# Patient Record
Sex: FEMALE | Race: WHITE | NOT HISPANIC OR LATINO | Employment: OTHER | URBAN - METROPOLITAN AREA
[De-identification: names, ages, dates, MRNs, and addresses within clinical notes are randomized per-mention and may not be internally consistent; named-entity substitution may affect disease eponyms.]

---

## 2019-09-15 ENCOUNTER — HOSPITAL ENCOUNTER (EMERGENCY)
Facility: HOSPITAL | Age: 69
Discharge: HOME | End: 2019-09-15
Attending: EMERGENCY MEDICINE | Admitting: EMERGENCY MEDICINE

## 2019-09-15 VITALS
OXYGEN SATURATION: 96 % | SYSTOLIC BLOOD PRESSURE: 138 MMHG | DIASTOLIC BLOOD PRESSURE: 63 MMHG | TEMPERATURE: 98 F | RESPIRATION RATE: 16 BRPM | WEIGHT: 172.5 LBS | HEART RATE: 82 BPM | HEIGHT: 64 IN | BODY MASS INDEX: 29.45 KG/M2

## 2019-09-15 DIAGNOSIS — M54.16 LUMBAR RADICULOPATHY: Primary | ICD-10-CM

## 2019-09-15 PROCEDURE — 96372 THER/PROPH/DIAG INJ SC/IM: CPT

## 2019-09-15 PROCEDURE — 25010000002 HYDROMORPHONE 1 MG/ML SOLUTION: Performed by: EMERGENCY MEDICINE

## 2019-09-15 PROCEDURE — 25010000002 METHYLPREDNISOLONE PER 125 MG: Performed by: EMERGENCY MEDICINE

## 2019-09-15 PROCEDURE — 99283 EMERGENCY DEPT VISIT LOW MDM: CPT

## 2019-09-15 RX ORDER — METHYLPREDNISOLONE SODIUM SUCCINATE 125 MG/2ML
125 INJECTION, POWDER, LYOPHILIZED, FOR SOLUTION INTRAMUSCULAR; INTRAVENOUS ONCE
Status: COMPLETED | OUTPATIENT
Start: 2019-09-15 | End: 2019-09-15

## 2019-09-15 RX ORDER — SUCRALFATE 1 G/1
1 TABLET ORAL 4 TIMES DAILY
COMMUNITY
End: 2020-02-04

## 2019-09-15 RX ORDER — METHYLPREDNISOLONE 4 MG/1
TABLET ORAL
Qty: 21 EACH | Refills: 0 | Status: SHIPPED | OUTPATIENT
Start: 2019-09-15 | End: 2019-09-22

## 2019-09-15 RX ORDER — RANITIDINE 150 MG/1
300 TABLET ORAL DAILY
COMMUNITY
End: 2020-02-04

## 2019-09-15 RX ORDER — ATORVASTATIN CALCIUM 40 MG/1
40 TABLET, FILM COATED ORAL EVERY MORNING
COMMUNITY
End: 2020-02-17

## 2019-09-15 RX ADMIN — METHYLPREDNISOLONE SODIUM SUCCINATE 125 MG: 125 INJECTION, POWDER, FOR SOLUTION INTRAMUSCULAR; INTRAVENOUS at 12:30

## 2019-09-15 RX ADMIN — HYDROMORPHONE HYDROCHLORIDE 1 MG: 1 INJECTION, SOLUTION INTRAMUSCULAR; INTRAVENOUS; SUBCUTANEOUS at 12:30

## 2019-09-15 NOTE — ED PROVIDER NOTES
" EMERGENCY DEPARTMENT ENCOUNTER    Room Number:  34/34  Date of encounter:  9/15/2019  PCP: Grant Layne MD  Historian: Patient,       HPI:  Chief Complaint: \"Sciatica\"  A complete HPI/ROS/PMH/PSH/SH/FH are unobtainable due to: None    Context: Ellen Adamson is a 68 y.o. female who presents to the ED c/o \"sciatic pain\" of the right leg.  Symptoms began 3 days ago.  No specific injury.  Pain is severe at worst.  Worse with walking.  Denies incontinence of bowel or bladder.  No new numbness or weakness.  Patient has had similar symptoms in the past.  She does take Percocet tens periodically which are prescribed by her primary care doctor.  She does not have a neurosurgeon or pain management doctor.      PAST MEDICAL HISTORY  Active Ambulatory Problems     Diagnosis Date Noted   • No Active Ambulatory Problems     Resolved Ambulatory Problems     Diagnosis Date Noted   • No Resolved Ambulatory Problems     Past Medical History:   Diagnosis Date   • CKD (chronic kidney disease) 2019   • COPD (chronic obstructive pulmonary disease) (CMS/[facility])    • Depression    • GERD (gastroesophageal reflux disease)    • Sjogren's syndrome (CMS/[facility])          PAST SURGICAL HISTORY  Past Surgical History:   Procedure Laterality Date   • APPENDECTOMY     • CHOLECYSTECTOMY     • HYSTERECTOMY           FAMILY HISTORY  Family History   Problem Relation Age of Onset   • No Known Problems Mother    • No Known Problems Father          SOCIAL HISTORY  Social History     Socioeconomic History   • Marital status:      Spouse name: Not on file   • Number of children: Not on file   • Years of education: Not on file   • Highest education level: Not on file   Tobacco Use   • Smoking status: Heavy Tobacco Smoker     Packs/day: 1.00     Years: 25.00     Pack years: 25.00   Substance and Sexual Activity   • Alcohol use: No   • Drug use: Defer   • Sexual activity: Defer         ALLERGIES  Iodine        REVIEW OF SYSTEMS  Review of " "Systems   Constitutional: Negative for fever.   Respiratory: Negative for shortness of breath.    Cardiovascular: Negative for chest pain.         PHYSICAL EXAM    I have reviewed the triage vital signs and nursing notes.    ED Triage Vitals [09/15/19 1143]   Temp Heart Rate Resp BP SpO2   97.3 °F (36.3 °C) 111 17 126/60 98 %      Temp src Heart Rate Source Patient Position BP Location FiO2 (%)   Tympanic -- -- -- --       Physical Exam  GENERAL: not distressed  HENT: nares patent  EYES: no scleral icterus  CV: regular rhythm, regular rate  RESPIRATORY: normal effort  ABDOMEN: soft  MUSCULOSKELETAL: no deformity  NEURO: alert, moves all extremities, strength sensation is normal in bilateral lower extremities area there is no saddle paresthesia  SKIN: warm, dry    PROCEDURES    Procedures      MEDICATIONS GIVEN IN ER    Medications   HYDROmorphone (DILAUDID) injection 1 mg (1 mg Intramuscular Given 9/15/19 1230)   methylPREDNISolone sodium succinate (SOLU-Medrol) injection 125 mg (125 mg Intramuscular Given 9/15/19 1230)         PROGRESS, DATA ANALYSIS, CONSULTS, AND MEDICAL DECISION MAKING    All labs have been independently reviewed by me.  All radiology studies have been reviewed by me and discussed with radiologist dictating the report.   EKG's independently viewed and interpreted by me.  Discussion below represents my analysis of pertinent findings related to patient's condition, differential diagnosis, treatment plan and final disposition.      ED Course as of Sep 15 1316   Sun Sep 15, 2019   1213 Patient with chronic lower back pain presents with right-sided sciatica.  I see no \"red flags\" on exam or history.  Will treat with IM Dilaudid/Phenergan and IM Solu-Medrol.  [DB]   1314 Patient is feeling much better after IM medications.  Will give a short course of Medrol Dosepak and follow-up with primary care provider.  Patient has Norco tens to take at home.  [DB]      ED Course User Index  [DB] Jann Berkowitz, " MD       AS OF 1:16 PM VITALS:    BP - 126/60  HR - 111  TEMP - 97.3 °F (36.3 °C) (Tympanic)  02 SATS - 98%        DIAGNOSIS  Final diagnoses:   Lumbar radiculopathy         DISPOSITION  DISCHARGE    Patient discharged in stable condition.    Reviewed implications of results, diagnosis, meds, responsibility to follow up, warning signs and symptoms of possible worsening, potential complications and reasons to return to ER, including increased back pain, fever or as needed.    Patient/Family voiced understanding of above instructions.    Discussed plan for discharge, as there is no emergent indication for admission. Patient referred to primary care provider for BP management due to today's BP. Pt/family is agreeable and understands need for follow up and repeat testing.  Pt is aware that discharge does not mean that nothing is wrong but it indicates no emergency is present that requires admission and they must continue care with follow-up as given below or physician of their choice.     FOLLOW-UP  Grant Layne MD  [address]  [address]  [place] [id number] [phone]    In 1 week  If Not Better         Medication List      New Prescriptions    methylPREDNISolone 4 MG tablet  Commonly known as:  MEDROL (KIARRA)  Take as directed on package instructions.                   Jann Berkowitz MD  09/15/19 5854

## 2019-09-15 NOTE — ED TRIAGE NOTES
Pt reports sciatica flare up x 3days, attempted to treat with heat, ice and pain patch with no relief. Pt arrives aaox4, abc's intact, ambulatory steady gait, NAD noted at this time.

## 2019-09-22 ENCOUNTER — HOSPITAL ENCOUNTER (EMERGENCY)
Facility: HOSPITAL | Age: 69
Discharge: HOME | End: 2019-09-22
Attending: EMERGENCY MEDICINE | Admitting: EMERGENCY MEDICINE

## 2019-09-22 VITALS
WEIGHT: 172.5 LBS | RESPIRATION RATE: 18 BRPM | BODY MASS INDEX: 29.45 KG/M2 | DIASTOLIC BLOOD PRESSURE: 64 MMHG | HEIGHT: 64 IN | TEMPERATURE: 97.8 F | OXYGEN SATURATION: 94 % | HEART RATE: 114 BPM | SYSTOLIC BLOOD PRESSURE: 142 MMHG

## 2019-09-22 DIAGNOSIS — M54.31 SCIATICA OF RIGHT SIDE: Primary | ICD-10-CM

## 2019-09-22 PROCEDURE — 25010000002 HYDROMORPHONE 1 MG/ML SOLUTION: Performed by: EMERGENCY MEDICINE

## 2019-09-22 PROCEDURE — 99283 EMERGENCY DEPT VISIT LOW MDM: CPT

## 2019-09-22 PROCEDURE — 96372 THER/PROPH/DIAG INJ SC/IM: CPT

## 2019-09-22 RX ORDER — CYCLOBENZAPRINE HCL 10 MG
10 TABLET ORAL 3 TIMES DAILY PRN
Qty: 30 TABLET | Refills: 0 | Status: SHIPPED | OUTPATIENT
Start: 2019-09-22 | End: 2020-02-04

## 2019-09-22 RX ORDER — ONDANSETRON 4 MG/1
4 TABLET, ORALLY DISINTEGRATING ORAL ONCE
Status: COMPLETED | OUTPATIENT
Start: 2019-09-22 | End: 2019-09-22

## 2019-09-22 RX ADMIN — HYDROMORPHONE HYDROCHLORIDE 1 MG: 1 INJECTION, SOLUTION INTRAMUSCULAR; INTRAVENOUS; SUBCUTANEOUS at 11:37

## 2019-09-22 RX ADMIN — ONDANSETRON 4 MG: 4 TABLET, ORALLY DISINTEGRATING ORAL at 11:37

## 2019-09-22 NOTE — ED NOTES
"Pt states \"My sciatica on my right side is killing me.\" Pt was seen here last week for same.     July Tobar, RN  09/22/19 1017    "

## 2019-09-22 NOTE — ED TRIAGE NOTES
"Pt c/o right sided sciatica pain for three weeks. She waas seen here for the same one week ago, given \"Dilaudid and steroids\" but she feels the pain is worsening. Pt reports she had similar issues about 8 years ago for which she had successful treatment with epidurals. Has not needed an epidural in several years.   "

## 2019-09-22 NOTE — ED PROVIDER NOTES
" EMERGENCY DEPARTMENT ENCOUNTER    Room Number:  04/04  Date of encounter:  9/22/2019  PCP: Grant Layne MD  Historian: Pt      HPI:  Chief Complaint: Right leg pain  A complete HPI/ROS/PMH/PSH/SH/FH are unobtainable due to: n/a    Context: Ellen Adamson is a 68 y.o. female who presents to the ED c/o acute on chronic right sided \"sciatica\" pain that has been progressively worsening for 3 weeks. She c/o burning pain that radiates down her RLE and into her groin. Her pain is aggravated by walking and her right leg occasionally \"gives out\" on her. Pt was seen in the ED on 9/15/19 for the same pain and was prescribed Dilaudid and Prednisone. She has taken these medication w/o significant relief. Pt denies recent fall, fever, saddle anaesthesia, bowel or bladder incontinence, weakness, and numbness.        Duration:  3 weeks  Timing: constant  Quality: burning  Intensity/Severity: moderate  Progression: worsening  Aggravating Factors: walking   Alleviating Factors: none      PAST MEDICAL HISTORY  Active Ambulatory Problems     Diagnosis Date Noted   • No Active Ambulatory Problems     Resolved Ambulatory Problems     Diagnosis Date Noted   • No Resolved Ambulatory Problems     Past Medical History:   Diagnosis Date   • CKD (chronic kidney disease) 2019   • COPD (chronic obstructive pulmonary disease) (CMS/[facility])    • Depression    • GERD (gastroesophageal reflux disease)    • Sjogren's syndrome (CMS/[facility])          PAST SURGICAL HISTORY  Past Surgical History:   Procedure Laterality Date   • APPENDECTOMY     • CHOLECYSTECTOMY     • HYSTERECTOMY           FAMILY HISTORY  Family History   Problem Relation Age of Onset   • No Known Problems Mother    • No Known Problems Father          SOCIAL HISTORY  Social History     Socioeconomic History   • Marital status:      Spouse name: Not on file   • Number of children: Not on file   • Years of education: Not on file   • Highest education level: Not on file   Tobacco " Use   • Smoking status: Heavy Tobacco Smoker     Packs/day: 1.00     Years: 25.00     Pack years: 25.00   Substance and Sexual Activity   • Alcohol use: No   • Drug use: Defer   • Sexual activity: Defer         ALLERGIES  Iodine        REVIEW OF SYSTEMS  Review of Systems   Constitutional: Negative for fever.   HENT: Negative for sore throat.    Eyes: Negative.    Respiratory: Negative for cough and shortness of breath.    Cardiovascular: Negative for chest pain.   Gastrointestinal: Negative for abdominal pain, diarrhea and vomiting.   Genitourinary: Negative for dysuria.   Musculoskeletal: Positive for myalgias (RLE). Negative for neck pain.   Skin: Negative for rash.   Allergic/Immunologic: Negative.    Neurological: Negative for weakness, numbness and headaches.        Denies bowel or bladder incontinence    Hematological: Negative.    Psychiatric/Behavioral: Negative.    All other systems reviewed and are negative.     All systems reviewed and negative except for those discussed in HPI.       PHYSICAL EXAM    I have reviewed the triage vital signs and nursing notes.    ED Triage Vitals   Temp Heart Rate Resp BP SpO2   09/22/19 1018 09/22/19 1018 09/22/19 1018 09/22/19 1045 09/22/19 1018   97.8 °F (36.6 °C) 114 18 142/64 94 %      Temp src Heart Rate Source Patient Position BP Location FiO2 (%)   09/22/19 1018 -- 09/22/19 1045 09/22/19 1045 --   Tympanic  Sitting Right arm        Physical Exam   Constitutional: Pt. is oriented to person, place, and time and well-developed, well-nourished, and in no distress. No distress.   HENT:   Head: Normocephalic and atraumatic.   Eyes: EOM are normal. Pupils are equal, round, and reactive to light.   Neck: Normal range of motion. Neck supple. No JVD present. No tracheal deviation present. No thyromegaly present.   Cardiovascular: Normal rate, regular rhythm and normal heart sounds. Exam reveals no gallop and no friction rub.   No murmur heard.  Pulmonary/Chest: Effort normal  and breath sounds normal. No stridor. No respiratory distress. No wheezes, no rales.   Abdominal: Soft. Bowel sounds are normal. No distension. There is no tenderness. There is no rebound and no guarding.   Musculoskeletal: Normal range of motion. No edema, tenderness or deformity.   Neurological: Pt. is alert and oriented to person, place, and time. Pt. has normal sensation and normal strength. No cranial nerve deficit. GCS score is 15.   5/5 strength to hip flexion, knee extension/flexion, dorsiflexion, plantarflexion, and EHL  2+ patellar reflexes bilaterally  SILT at bilateral superficial peroneal, deep peroneal, sural, and saphenous nerves  Skin: Skin is warm and dry. No rash noted. Pt. is not diaphoretic. No erythema.   Psychiatric: Mood, affect and judgment normal.   Nursing note and vitals reviewed.        PROCEDURES    Procedures      MEDICATIONS GIVEN IN ER    Medications   HYDROmorphone (DILAUDID) injection 1 mg (1 mg Intramuscular Given 9/22/19 1137)   ondansetron ODT (ZOFRAN-ODT) disintegrating tablet 4 mg (4 mg Oral Given 9/22/19 1137)         PROGRESS, DATA ANALYSIS, CONSULTS, AND MEDICAL DECISION MAKING   Discussion below represents my analysis of pertinent findings related to patient's condition, differential diagnosis, treatment plan and final disposition.      ED Course as of Sep 22 1540   Sun Sep 22, 2019   1130 Patient denies any trauma to back. No fevers. No h/o IVDU. No chronic steroid use. No anticoagulants. No h/o malignancy. No saddle anesthesia or urinary/fecal incontinence.     5/5 strength to hip flexion, knee extension/flexion, dorsiflexion, plantarflexion, and EHL  2+ patellar reflexes bilaterally  SILT at bilateral superficial peroneal, deep peroneal, sural, and saphenous nerves    [WC]      ED Course User Index  [WC] Kris Chilel MD      11:29 AM  Informed pt that she needs to f/u with her PCP to get a referral to pain management. Pt will be discharged after 1 dose of pain  medicine in the ED. Will prescribe muscle relaxer. Pt understands and agrees with the plan, all questions answered.  Zofran and Dilaudid ordered.       AS OF 3:40 PM VITALS:    BP - 142/64  HR - 114  TEMP - 97.8 °F (36.6 °C) (Tympanic)  02 SATS - 94%        DIAGNOSIS  Final diagnoses:   Sciatica of right side         DISPOSITION  DISCHARGE    Patient discharged in stable condition.    Reviewed implications of results, diagnosis, meds, responsibility to follow up, warning signs and symptoms of possible worsening, potential complications and reasons to return to ER.    Patient/Family voiced understanding of above instructions.    Discussed plan for discharge, as there is no emergent indication for admission. Patient referred to primary care provider for BP management due to today's BP. Pt/family is agreeable and understands need for follow up and repeat testing.  Pt is aware that discharge does not mean that nothing is wrong but it indicates no emergency is present that requires admission and they must continue care with follow-up as given below or physician of their choice.     FOLLOW-UP  Grant Layne MD  [address]  [place] [id number]  [phone]    Schedule an appointment as soon as possible for a visit   for referral to pain management.         Medication List      New Prescriptions    cyclobenzaprine 10 MG tablet  Commonly known as:  FLEXERIL  Take 1 tablet by mouth 3 (Three) Times a Day As Needed for Muscle Spasms.        Stop    metaxalone 800 MG tablet  Commonly known as:  SKELAXIN                Documentation assistance provided by kathrin Stiles for Dr. Chilel.  Information recorded by the neetue was done at my direction and has been verified and validated by me.        Gabrielle Stiles  09/22/19 1256       Kris Chilel MD  09/22/19 0478

## 2020-02-04 ENCOUNTER — HOSPITAL ENCOUNTER (OUTPATIENT)
Dept: GENERAL RADIOLOGY | Facility: HOSPITAL | Age: 70
Discharge: HOME | End: 2020-02-04

## 2020-02-04 ENCOUNTER — APPOINTMENT (OUTPATIENT)
Dept: PREADMISSION TESTING | Facility: HOSPITAL | Age: 70
End: 2020-02-04

## 2020-02-04 ENCOUNTER — HOSPITAL ENCOUNTER (OUTPATIENT)
Dept: GENERAL RADIOLOGY | Facility: HOSPITAL | Age: 70
Discharge: HOME | End: 2020-02-04
Admitting: ORTHOPAEDIC SURGERY

## 2020-02-04 LAB
ANION GAP SERPL CALCULATED.3IONS-SCNC: 13.8 MMOL/L (ref 5–15)
BACTERIA UR QL AUTO: NORMAL /HPF
BILIRUB UR QL STRIP: NEGATIVE
BUN BLD-MCNC: 17 MG/DL (ref 8–23)
BUN/CREAT SERPL: 16.8 (ref 7–25)
CALCIUM SPEC-SCNC: 9.3 MG/DL (ref 8.6–10.5)
CHLORIDE SERPL-SCNC: 102 MMOL/L (ref 98–107)
CLARITY UR: CLEAR
CO2 SERPL-SCNC: 24.2 MMOL/L (ref 22–29)
COLOR UR: YELLOW
CREAT BLD-MCNC: 1.01 MG/DL (ref 0.57–1)
DEPRECATED RDW RBC AUTO: 46.5 FL (ref 37–54)
ERYTHROCYTE [DISTWIDTH] IN BLOOD BY AUTOMATED COUNT: 14.5 % (ref 12.3–15.4)
GFR SERPL CREATININE-BSD FRML MDRD: 54 ML/MIN/1.73
GLUCOSE BLD-MCNC: 93 MG/DL (ref 65–99)
GLUCOSE UR STRIP-MCNC: NEGATIVE MG/DL
HCT VFR BLD AUTO: 37 % (ref 34–46.6)
HGB BLD-MCNC: 12 G/DL (ref 12–15.9)
HGB UR QL STRIP.AUTO: NEGATIVE
HYALINE CASTS UR QL AUTO: NORMAL /LPF
KETONES UR QL STRIP: NEGATIVE
LEUKOCYTE ESTERASE UR QL STRIP.AUTO: NEGATIVE
MCH RBC QN AUTO: 28.2 PG (ref 26.6–33)
MCHC RBC AUTO-ENTMCNC: 32.4 G/DL (ref 31.5–35.7)
MCV RBC AUTO: 87.1 FL (ref 79–97)
NITRITE UR QL STRIP: NEGATIVE
PH UR STRIP.AUTO: <=5 [PH] (ref 5–8)
PLATELET # BLD AUTO: 357 10*3/MM3 (ref 140–450)
PMV BLD AUTO: 9.8 FL (ref 6–12)
POTASSIUM BLD-SCNC: 4 MMOL/L (ref 3.5–5.2)
PROT UR QL STRIP: NEGATIVE
RBC # BLD AUTO: 4.25 10*6/MM3 (ref 3.77–5.28)
RBC # UR: NORMAL /HPF
REF LAB TEST METHOD: NORMAL
SODIUM BLD-SCNC: 140 MMOL/L (ref 136–145)
SP GR UR STRIP: 1.02 (ref 1–1.03)
SQUAMOUS #/AREA URNS HPF: NORMAL /HPF
UROBILINOGEN UR QL STRIP: NORMAL
WBC NRBC COR # BLD: 11.22 10*3/MM3 (ref 3.4–10.8)
WBC UR QL AUTO: NORMAL /HPF

## 2020-02-04 PROCEDURE — 73502 X-RAY EXAM HIP UNI 2-3 VIEWS: CPT

## 2020-02-04 PROCEDURE — 81001 URINALYSIS AUTO W/SCOPE: CPT | Performed by: ORTHOPAEDIC SURGERY

## 2020-02-04 PROCEDURE — 36415 COLL VENOUS BLD VENIPUNCTURE: CPT

## 2020-02-04 PROCEDURE — 85027 COMPLETE CBC AUTOMATED: CPT | Performed by: ORTHOPAEDIC SURGERY

## 2020-02-04 PROCEDURE — 87086 URINE CULTURE/COLONY COUNT: CPT | Performed by: ORTHOPAEDIC SURGERY

## 2020-02-04 PROCEDURE — 80048 BASIC METABOLIC PNL TOTAL CA: CPT | Performed by: ORTHOPAEDIC SURGERY

## 2020-02-04 PROCEDURE — 71046 X-RAY EXAM CHEST 2 VIEWS: CPT

## 2020-02-04 PROCEDURE — 93010 ELECTROCARDIOGRAM REPORT: CPT | Performed by: INTERNAL MEDICINE

## 2020-02-04 PROCEDURE — A9270 NON-COVERED ITEM OR SERVICE: HCPCS | Performed by: ORTHOPAEDIC SURGERY

## 2020-02-04 PROCEDURE — 63710000001 MUPIROCIN 2 % OINTMENT: Performed by: ORTHOPAEDIC SURGERY

## 2020-02-04 PROCEDURE — 93005 ELECTROCARDIOGRAM TRACING: CPT

## 2020-02-04 RX ORDER — DULOXETIN HYDROCHLORIDE 60 MG/1
60 CAPSULE, DELAYED RELEASE ORAL EVERY MORNING
Status: ON HOLD | COMMUNITY
End: 2020-02-28

## 2020-02-04 ASSESSMENT — HOOS JR
HOOS JR SCORE: 18
HOOS JR SCORE: 32.735

## 2020-02-04 NOTE — DISCHARGE INSTRUCTIONS
Take the following medications the morning of surgery:    VIIBYRD, CYMBALTA  AND WELLBURTIN    ARRIVE AT 5:15    General Instructions:  • Do not eat solid food after midnight the night before surgery.  • You may drink clear liquids day of surgery but must stop at least one hour before your hospital arrival time.  • It is beneficial for you to have a clear drink that contains carbohydrates the day of surgery.  We suggest a 12 to 20 ounce bottle of Gatorade or Powerade for non-diabetic patients or a 12 to 20 ounce bottle of G2 or Powerade Zero for diabetic patients. (Pediatric patients, are not advised to drink a 12 to 20 ounce carbohydrate drink)    Clear liquids are liquids you can see through.  Nothing red in color.     Plain water                               Sports drinks  Sodas                                   Gelatin (Jell-O)  Fruit juices without pulp such as white grape juice and apple juice  Popsicles that contain no fruit or yogurt  Tea or coffee (no cream or milk added)  Gatorade / Powerade  G2 / Powerade Zero    • Infants may have breast milk up to four hours before surgery.  • Infants drinking formula may drink formula up to six hours before surgery.   • Patients who avoid smoking, chewing tobacco and alcohol for 4 weeks prior to surgery have a reduced risk of post-operative complications.  Quit smoking as many days before surgery as you can.  • Do not smoke, use chewing tobacco or drink alcohol the day of surgery.   • If applicable bring your C-PAP/ BI-PAP machine.  • Bring any papers given to you in the doctor’s office.  • Wear clean comfortable clothes.  • Do not wear contact lenses, false eyelashes or make-up.  Bring a case for your glasses.   • Bring crutches or walker if applicable.  • Remove all piercings.  Leave jewelry and any other valuables at home.  • Hair extensions with metal clips must be removed prior to surgery.  • The Pre-Admission Testing nurse will instruct you to bring medications  if unable to obtain an accurate list in Pre-Admission Testing.        If you were given a blood bank ID arm band remember to bring it with you the day of surgery.    Preventing a Surgical Site Infection:  • For 2 to 3 days before surgery, avoid shaving with a razor because the razor can irritate skin and make it easier to develop an infection.    • Any areas of open skin can increase the risk of a post-operative wound infection by allowing bacteria to enter and travel throughout the body.  Notify your surgeon if you have any skin wounds / rashes even if it is not near the expected surgical site.  The area will need assessed to determine if surgery should be delayed until it is healed.  • The night prior to surgery sleep in a clean bed with clean clothing.  Do not allow pets to sleep with you.  • Shower on the morning of surgery using a fresh bar of anti-bacterial soap (such as Dial) and clean washcloth.  Dry with a clean towel and dress in clean clothing.  • Ask your surgeon if you will be receiving antibiotics prior to surgery.  • Make sure you, your family, and all healthcare providers clean their hands with soap and water or an alcohol based hand  before caring for you or your wound.    Day of surgery:  Your arrival time is approximately two hours before your scheduled surgery time.  Upon arrival, a Pre-op nurse and Anesthesiologist will review your health history, obtain vital signs, and answer questions you may have.  The only belongings needed at this time will be a list of your home medications and if applicable your C-PAP/BI-PAP machine.  If you are staying overnight your family can leave the rest of your belongings in the car and bring them to your room later.  A Pre-op nurse will start an IV and you may receive medication in preparation for surgery, including something to help you relax.  Your family will be able to see you in the Pre-op area.  Two visitors at a time will be allowed in the Pre-op  room.  While you are in surgery your family should notify the waiting room  if they leave the waiting room area and provide a contact phone number.    Please be aware that surgery does come with discomfort.  We want to make every effort to control your discomfort so please discuss any uncontrolled symptoms with your nurse.   Your doctor will most likely have prescribed pain medications.      If you are going home after surgery you will receive individualized written care instructions before being discharged.  A responsible adult must drive you to and from the hospital on the day of your surgery and stay with you for 24 hours.    If you are staying overnight following surgery, you will be transported to your hospital room following the recovery period.  [facility] has all private rooms.    If you have any questions please call Pre-Admission Testing at [phone].  Deductibles and co-payments are collected on the day of service. Please be prepared to pay the required co-pay, deductible or deposit on the day of service as defined by your plan.  2% CHLORHEXIDINE GLUCONATE* CLOTH  Preparing or “prepping” skin before surgery can reduce the risk of infection at the surgical site. To make the process easier, [facility] has chosen disposable cloths moistened with a rinse-free, 2% Chlorhexidine Gluconate (CHG) antiseptic solution. The steps below outline the prepping process and should be carefully followed.        Use the prep cloth on the area that is circled in the diagram             Directions Night before Surgery  1) Shower using a fresh bar of anti-bacterial soap (such as Dial) and clean washcloth.  Use a clean towel to completely dry your skin.  2) Do not use any lotions, oils or creams on your skin.  3) Open the package and remove 1 cloth, wipe your skin for 30 seconds in a circular motion.  Allow to dry for 3 minutes.  4) Repeat #3 with second cloth.  5) Do not touch your  eyes, ears, or mouth with the prep cloth.  6) Allow the wet prep solution to air dry.  7) Discard the prep cloth and wash your hands with soap and water.   8) Dress in clean bed clothes and sleep on fresh clean bed sheets.   9) You may experience some temporary itching after the prep.    Directions Day of Surgery  1) Repeat steps 1,2,3,4,5,6,7, and 9.   2) Dress in clean clothes before coming to the hospital.    BACTROBAN NASAL OINTMENT  There are many germs normally in your nose. Bactroban is an ointment that will help reduce these germs. Please follow these instructions for Bactroban use:      ____The day before surgery in the morning  Date________    ____The day before surgery in the evening              Date________    ____The day of surgery in the morning    Date________    **Squirt ½ package of Bactroban Ointment onto a cotton applicator and apply to inside of 1st nostril.  Squirt the remaining Bactroban and apply to the inside of the other nostril.    PERIDEX- ORAL:  Use only if your surgeon has ordered  Use the night before and morning of surgery - Swish, gargle, and spit - do not swallow.

## 2020-02-05 LAB — BACTERIA SPEC AEROBE CULT: NO GROWTH

## 2020-02-10 ENCOUNTER — HOSPITAL ENCOUNTER (INPATIENT)
Facility: HOSPITAL | Age: 70
LOS: 1 days | Discharge: HOME HEALTH | End: 2020-02-11
Attending: ORTHOPAEDIC SURGERY | Admitting: ORTHOPAEDIC SURGERY

## 2020-02-10 ENCOUNTER — ANESTHESIA (OUTPATIENT)
Dept: PERIOP | Facility: HOSPITAL | Age: 70
End: 2020-02-10

## 2020-02-10 ENCOUNTER — ANESTHESIA EVENT (OUTPATIENT)
Dept: PERIOP | Facility: HOSPITAL | Age: 70
End: 2020-02-10

## 2020-02-10 ENCOUNTER — APPOINTMENT (OUTPATIENT)
Dept: GENERAL RADIOLOGY | Facility: HOSPITAL | Age: 70
End: 2020-02-10

## 2020-02-10 DIAGNOSIS — M16.11 PRIMARY OSTEOARTHRITIS OF RIGHT HIP: Primary | ICD-10-CM

## 2020-02-10 LAB
HCT VFR BLD AUTO: 34.3 % (ref 34–46.6)
HGB BLD-MCNC: 11.2 G/DL (ref 12–15.9)

## 2020-02-10 PROCEDURE — 25010000002 DEXAMETHASONE PER 1 MG: Performed by: NURSE ANESTHETIST, CERTIFIED REGISTERED

## 2020-02-10 PROCEDURE — 73501 X-RAY EXAM HIP UNI 1 VIEW: CPT

## 2020-02-10 PROCEDURE — C1776 JOINT DEVICE (IMPLANTABLE): HCPCS | Performed by: ORTHOPAEDIC SURGERY

## 2020-02-10 PROCEDURE — C9290 INJ, BUPIVACAINE LIPOSOME: HCPCS | Performed by: ORTHOPAEDIC SURGERY

## 2020-02-10 PROCEDURE — 76000 FLUOROSCOPY <1 HR PHYS/QHP: CPT

## 2020-02-10 PROCEDURE — 25010000003 BUPIVACAINE LIPOSOME 1.3 % SUSPENSION 20 ML VIAL: Performed by: ORTHOPAEDIC SURGERY

## 2020-02-10 PROCEDURE — 25010000002 FENTANYL CITRATE (PF) 100 MCG/2ML SOLUTION: Performed by: NURSE ANESTHETIST, CERTIFIED REGISTERED

## 2020-02-10 PROCEDURE — 85014 HEMATOCRIT: CPT | Performed by: ORTHOPAEDIC SURGERY

## 2020-02-10 PROCEDURE — 25010000002 PROPOFOL 10 MG/ML EMULSION: Performed by: NURSE ANESTHETIST, CERTIFIED REGISTERED

## 2020-02-10 PROCEDURE — 25010000003 CEFAZOLIN IN DEXTROSE 2-4 GM/100ML-% SOLUTION: Performed by: ORTHOPAEDIC SURGERY

## 2020-02-10 PROCEDURE — 25010000002 NEOSTIGMINE PER 0.5 MG: Performed by: NURSE ANESTHETIST, CERTIFIED REGISTERED

## 2020-02-10 PROCEDURE — 97110 THERAPEUTIC EXERCISES: CPT | Performed by: PHYSICAL THERAPIST

## 2020-02-10 PROCEDURE — 25010000002 ONDANSETRON PER 1 MG: Performed by: NURSE ANESTHETIST, CERTIFIED REGISTERED

## 2020-02-10 PROCEDURE — 97162 PT EVAL MOD COMPLEX 30 MIN: CPT | Performed by: PHYSICAL THERAPIST

## 2020-02-10 PROCEDURE — 85018 HEMOGLOBIN: CPT | Performed by: ORTHOPAEDIC SURGERY

## 2020-02-10 PROCEDURE — 0SR904A REPLACEMENT OF RIGHT HIP JOINT WITH CERAMIC ON POLYETHYLENE SYNTHETIC SUBSTITUTE, UNCEMENTED, OPEN APPROACH: ICD-10-PCS | Performed by: ORTHOPAEDIC SURGERY

## 2020-02-10 DEVICE — LINER ACET G7 VIVACIT/E HI/WL HXPE SZC 32MM: Type: IMPLANTABLE DEVICE | Site: HIP | Status: FUNCTIONAL

## 2020-02-10 DEVICE — CAP HIP TM UPCHRG: Type: IMPLANTABLE DEVICE | Site: HIP | Status: FUNCTIONAL

## 2020-02-10 DEVICE — CAP HIP VE UPCHRG: Type: IMPLANTABLE DEVICE | Site: HIP | Status: FUNCTIONAL

## 2020-02-10 DEVICE — CAP CERAM HD HIP UPCHRG: Type: IMPLANTABLE DEVICE | Site: HIP | Status: FUNCTIONAL

## 2020-02-10 DEVICE — TOTAL HIP PRIMARY: Type: IMPLANTABLE DEVICE | Site: HIP | Status: FUNCTIONAL

## 2020-02-10 DEVICE — SHLL ACET OSSEOTI G7 3H SZC 48MM: Type: IMPLANTABLE DEVICE | Site: HIP | Status: FUNCTIONAL

## 2020-02-10 DEVICE — IMPLANTABLE DEVICE: Type: IMPLANTABLE DEVICE | Site: HIP | Status: FUNCTIONAL

## 2020-02-10 DEVICE — BIOLOX® DELTA, CERAMIC FEMORAL HEAD, S, Ø 32/-3.5, TAPER 12/14
Type: IMPLANTABLE DEVICE | Site: HIP | Status: FUNCTIONAL
Brand: BIOLOX® DELTA

## 2020-02-10 RX ORDER — EPHEDRINE SULFATE 50 MG/ML
5 INJECTION, SOLUTION INTRAVENOUS ONCE AS NEEDED
Status: DISCONTINUED | OUTPATIENT
Start: 2020-02-10 | End: 2020-02-10 | Stop reason: HOSPADM

## 2020-02-10 RX ORDER — VILAZODONE HYDROCHLORIDE 40 MG/1
40 TABLET ORAL EVERY MORNING
Status: DISCONTINUED | OUTPATIENT
Start: 2020-02-11 | End: 2020-02-11 | Stop reason: HOSPADM

## 2020-02-10 RX ORDER — DEXAMETHASONE SODIUM PHOSPHATE 10 MG/ML
INJECTION INTRAMUSCULAR; INTRAVENOUS AS NEEDED
Status: DISCONTINUED | OUTPATIENT
Start: 2020-02-10 | End: 2020-02-10 | Stop reason: SURG

## 2020-02-10 RX ORDER — HYDROMORPHONE HYDROCHLORIDE 1 MG/ML
0.5 INJECTION, SOLUTION INTRAMUSCULAR; INTRAVENOUS; SUBCUTANEOUS
Status: DISCONTINUED | OUTPATIENT
Start: 2020-02-10 | End: 2020-02-10 | Stop reason: HOSPADM

## 2020-02-10 RX ORDER — BISACODYL 10 MG
10 SUPPOSITORY, RECTAL RECTAL DAILY PRN
Status: DISCONTINUED | OUTPATIENT
Start: 2020-02-10 | End: 2020-02-11 | Stop reason: HOSPADM

## 2020-02-10 RX ORDER — ACETAMINOPHEN 325 MG/1
325 TABLET ORAL EVERY 4 HOURS PRN
Status: DISCONTINUED | OUTPATIENT
Start: 2020-02-10 | End: 2020-02-11 | Stop reason: HOSPADM

## 2020-02-10 RX ORDER — ONDANSETRON 2 MG/ML
INJECTION INTRAMUSCULAR; INTRAVENOUS AS NEEDED
Status: DISCONTINUED | OUTPATIENT
Start: 2020-02-10 | End: 2020-02-10 | Stop reason: SURG

## 2020-02-10 RX ORDER — SODIUM CHLORIDE 0.9 % (FLUSH) 0.9 %
3-10 SYRINGE (ML) INJECTION AS NEEDED
Status: DISCONTINUED | OUTPATIENT
Start: 2020-02-10 | End: 2020-02-11 | Stop reason: HOSPADM

## 2020-02-10 RX ORDER — DULOXETIN HYDROCHLORIDE 60 MG/1
60 CAPSULE, DELAYED RELEASE ORAL EVERY MORNING
Status: DISCONTINUED | OUTPATIENT
Start: 2020-02-11 | End: 2020-02-11 | Stop reason: HOSPADM

## 2020-02-10 RX ORDER — SODIUM CHLORIDE, SODIUM LACTATE, POTASSIUM CHLORIDE, CALCIUM CHLORIDE 600; 310; 30; 20 MG/100ML; MG/100ML; MG/100ML; MG/100ML
9 INJECTION, SOLUTION INTRAVENOUS CONTINUOUS
Status: DISCONTINUED | OUTPATIENT
Start: 2020-02-10 | End: 2020-02-10 | Stop reason: HOSPADM

## 2020-02-10 RX ORDER — PROPOFOL 10 MG/ML
VIAL (ML) INTRAVENOUS AS NEEDED
Status: DISCONTINUED | OUTPATIENT
Start: 2020-02-10 | End: 2020-02-10 | Stop reason: SURG

## 2020-02-10 RX ORDER — UREA 10 %
1 LOTION (ML) TOPICAL NIGHTLY PRN
Status: DISCONTINUED | OUTPATIENT
Start: 2020-02-10 | End: 2020-02-11 | Stop reason: HOSPADM

## 2020-02-10 RX ORDER — CEFAZOLIN SODIUM 2 G/100ML
2 INJECTION, SOLUTION INTRAVENOUS EVERY 8 HOURS
Status: COMPLETED | OUTPATIENT
Start: 2020-02-10 | End: 2020-02-10

## 2020-02-10 RX ORDER — ONDANSETRON 2 MG/ML
4 INJECTION INTRAMUSCULAR; INTRAVENOUS ONCE AS NEEDED
Status: DISCONTINUED | OUTPATIENT
Start: 2020-02-10 | End: 2020-02-10 | Stop reason: HOSPADM

## 2020-02-10 RX ORDER — SODIUM CHLORIDE, SODIUM LACTATE, POTASSIUM CHLORIDE, CALCIUM CHLORIDE 600; 310; 30; 20 MG/100ML; MG/100ML; MG/100ML; MG/100ML
100 INJECTION, SOLUTION INTRAVENOUS CONTINUOUS
Status: DISCONTINUED | OUTPATIENT
Start: 2020-02-10 | End: 2020-02-11 | Stop reason: HOSPADM

## 2020-02-10 RX ORDER — ROCURONIUM BROMIDE 10 MG/ML
INJECTION, SOLUTION INTRAVENOUS AS NEEDED
Status: DISCONTINUED | OUTPATIENT
Start: 2020-02-10 | End: 2020-02-10 | Stop reason: SURG

## 2020-02-10 RX ORDER — HYDROCODONE BITARTRATE AND ACETAMINOPHEN 10; 325 MG/1; MG/1
1 TABLET ORAL EVERY 4 HOURS PRN
Status: DISCONTINUED | OUTPATIENT
Start: 2020-02-10 | End: 2020-02-11 | Stop reason: HOSPADM

## 2020-02-10 RX ORDER — MAGNESIUM HYDROXIDE 1200 MG/15ML
LIQUID ORAL AS NEEDED
Status: DISCONTINUED | OUTPATIENT
Start: 2020-02-10 | End: 2020-02-10 | Stop reason: HOSPADM

## 2020-02-10 RX ORDER — ONDANSETRON 4 MG/1
4 TABLET, FILM COATED ORAL EVERY 6 HOURS PRN
Status: DISCONTINUED | OUTPATIENT
Start: 2020-02-10 | End: 2020-02-11 | Stop reason: HOSPADM

## 2020-02-10 RX ORDER — FAMOTIDINE 10 MG/ML
20 INJECTION, SOLUTION INTRAVENOUS ONCE
Status: COMPLETED | OUTPATIENT
Start: 2020-02-10 | End: 2020-02-10

## 2020-02-10 RX ORDER — SENNA AND DOCUSATE SODIUM 50; 8.6 MG/1; MG/1
2 TABLET, FILM COATED ORAL 2 TIMES DAILY
Status: DISCONTINUED | OUTPATIENT
Start: 2020-02-10 | End: 2020-02-11 | Stop reason: HOSPADM

## 2020-02-10 RX ORDER — CEFAZOLIN SODIUM 2 G/100ML
2 INJECTION, SOLUTION INTRAVENOUS ONCE
Status: COMPLETED | OUTPATIENT
Start: 2020-02-10 | End: 2020-02-10

## 2020-02-10 RX ORDER — LABETALOL HYDROCHLORIDE 5 MG/ML
5 INJECTION, SOLUTION INTRAVENOUS
Status: DISCONTINUED | OUTPATIENT
Start: 2020-02-10 | End: 2020-02-10 | Stop reason: HOSPADM

## 2020-02-10 RX ORDER — SODIUM CHLORIDE 0.9 % (FLUSH) 0.9 %
10 SYRINGE (ML) INJECTION AS NEEDED
Status: DISCONTINUED | OUTPATIENT
Start: 2020-02-10 | End: 2020-02-10 | Stop reason: HOSPADM

## 2020-02-10 RX ORDER — OXYCODONE AND ACETAMINOPHEN 7.5; 325 MG/1; MG/1
1 TABLET ORAL ONCE AS NEEDED
Status: DISCONTINUED | OUTPATIENT
Start: 2020-02-10 | End: 2020-02-10 | Stop reason: HOSPADM

## 2020-02-10 RX ORDER — SODIUM CHLORIDE 0.9 % (FLUSH) 0.9 %
3 SYRINGE (ML) INJECTION EVERY 12 HOURS SCHEDULED
Status: DISCONTINUED | OUTPATIENT
Start: 2020-02-10 | End: 2020-02-11 | Stop reason: HOSPADM

## 2020-02-10 RX ORDER — GLYCOPYRROLATE 0.2 MG/ML
INJECTION INTRAMUSCULAR; INTRAVENOUS AS NEEDED
Status: DISCONTINUED | OUTPATIENT
Start: 2020-02-10 | End: 2020-02-10 | Stop reason: SURG

## 2020-02-10 RX ORDER — MIDAZOLAM HYDROCHLORIDE 1 MG/ML
1 INJECTION INTRAMUSCULAR; INTRAVENOUS
Status: DISCONTINUED | OUTPATIENT
Start: 2020-02-10 | End: 2020-02-10 | Stop reason: HOSPADM

## 2020-02-10 RX ORDER — FENTANYL CITRATE 50 UG/ML
INJECTION, SOLUTION INTRAMUSCULAR; INTRAVENOUS AS NEEDED
Status: DISCONTINUED | OUTPATIENT
Start: 2020-02-10 | End: 2020-02-10 | Stop reason: SURG

## 2020-02-10 RX ORDER — DIPHENHYDRAMINE HCL 25 MG
25 CAPSULE ORAL
Status: DISCONTINUED | OUTPATIENT
Start: 2020-02-10 | End: 2020-02-10 | Stop reason: HOSPADM

## 2020-02-10 RX ORDER — HYDROCODONE BITARTRATE AND ACETAMINOPHEN 7.5; 325 MG/1; MG/1
1 TABLET ORAL ONCE AS NEEDED
Status: DISCONTINUED | OUTPATIENT
Start: 2020-02-10 | End: 2020-02-10 | Stop reason: HOSPADM

## 2020-02-10 RX ORDER — MIDAZOLAM HYDROCHLORIDE 1 MG/ML
2 INJECTION INTRAMUSCULAR; INTRAVENOUS
Status: DISCONTINUED | OUTPATIENT
Start: 2020-02-10 | End: 2020-02-10 | Stop reason: HOSPADM

## 2020-02-10 RX ORDER — FLUMAZENIL 0.1 MG/ML
0.2 INJECTION INTRAVENOUS AS NEEDED
Status: DISCONTINUED | OUTPATIENT
Start: 2020-02-10 | End: 2020-02-10 | Stop reason: HOSPADM

## 2020-02-10 RX ORDER — DIPHENHYDRAMINE HYDROCHLORIDE 50 MG/ML
12.5 INJECTION INTRAMUSCULAR; INTRAVENOUS
Status: DISCONTINUED | OUTPATIENT
Start: 2020-02-10 | End: 2020-02-10 | Stop reason: HOSPADM

## 2020-02-10 RX ORDER — ONDANSETRON 2 MG/ML
4 INJECTION INTRAMUSCULAR; INTRAVENOUS EVERY 6 HOURS PRN
Status: DISCONTINUED | OUTPATIENT
Start: 2020-02-10 | End: 2020-02-11 | Stop reason: HOSPADM

## 2020-02-10 RX ORDER — TRANEXAMIC ACID 100 MG/ML
INJECTION, SOLUTION INTRAVENOUS AS NEEDED
Status: DISCONTINUED | OUTPATIENT
Start: 2020-02-10 | End: 2020-02-10 | Stop reason: SURG

## 2020-02-10 RX ORDER — HYDROMORPHONE HYDROCHLORIDE 1 MG/ML
0.5 INJECTION, SOLUTION INTRAMUSCULAR; INTRAVENOUS; SUBCUTANEOUS
Status: DISCONTINUED | OUTPATIENT
Start: 2020-02-10 | End: 2020-02-11 | Stop reason: HOSPADM

## 2020-02-10 RX ORDER — ACETAMINOPHEN 325 MG/1
650 TABLET ORAL ONCE AS NEEDED
Status: DISCONTINUED | OUTPATIENT
Start: 2020-02-10 | End: 2020-02-10 | Stop reason: HOSPADM

## 2020-02-10 RX ORDER — HYDRALAZINE HYDROCHLORIDE 20 MG/ML
5 INJECTION INTRAMUSCULAR; INTRAVENOUS
Status: DISCONTINUED | OUTPATIENT
Start: 2020-02-10 | End: 2020-02-10 | Stop reason: HOSPADM

## 2020-02-10 RX ORDER — ALPRAZOLAM 0.5 MG/1
1 TABLET ORAL 3 TIMES DAILY PRN
Status: DISCONTINUED | OUTPATIENT
Start: 2020-02-10 | End: 2020-02-11 | Stop reason: HOSPADM

## 2020-02-10 RX ORDER — FENTANYL CITRATE 50 UG/ML
50 INJECTION, SOLUTION INTRAMUSCULAR; INTRAVENOUS
Status: DISCONTINUED | OUTPATIENT
Start: 2020-02-10 | End: 2020-02-10 | Stop reason: HOSPADM

## 2020-02-10 RX ORDER — BUPROPION HYDROCHLORIDE 150 MG/1
150 TABLET, EXTENDED RELEASE ORAL 2 TIMES DAILY
Status: DISCONTINUED | OUTPATIENT
Start: 2020-02-10 | End: 2020-02-11 | Stop reason: HOSPADM

## 2020-02-10 RX ORDER — PROMETHAZINE HYDROCHLORIDE 12.5 MG/1
12.5 TABLET ORAL EVERY 6 HOURS PRN
Status: DISCONTINUED | OUTPATIENT
Start: 2020-02-10 | End: 2020-02-11 | Stop reason: HOSPADM

## 2020-02-10 RX ORDER — PROMETHAZINE HYDROCHLORIDE 25 MG/1
25 TABLET ORAL ONCE AS NEEDED
Status: DISCONTINUED | OUTPATIENT
Start: 2020-02-10 | End: 2020-02-10 | Stop reason: HOSPADM

## 2020-02-10 RX ORDER — ASPIRIN 81 MG/1
81 TABLET ORAL EVERY 12 HOURS SCHEDULED
Status: DISCONTINUED | OUTPATIENT
Start: 2020-02-10 | End: 2020-02-11 | Stop reason: HOSPADM

## 2020-02-10 RX ORDER — HYDROCODONE BITARTRATE AND ACETAMINOPHEN 10; 325 MG/1; MG/1
2 TABLET ORAL EVERY 4 HOURS PRN
Status: DISCONTINUED | OUTPATIENT
Start: 2020-02-10 | End: 2020-02-11 | Stop reason: HOSPADM

## 2020-02-10 RX ORDER — ATORVASTATIN CALCIUM 20 MG/1
40 TABLET, FILM COATED ORAL EVERY MORNING
Status: DISCONTINUED | OUTPATIENT
Start: 2020-02-10 | End: 2020-02-11 | Stop reason: HOSPADM

## 2020-02-10 RX ORDER — SODIUM CHLORIDE 0.9 % (FLUSH) 0.9 %
10 SYRINGE (ML) INJECTION EVERY 12 HOURS SCHEDULED
Status: DISCONTINUED | OUTPATIENT
Start: 2020-02-10 | End: 2020-02-10 | Stop reason: HOSPADM

## 2020-02-10 RX ORDER — PROMETHAZINE HYDROCHLORIDE 25 MG/ML
6.25 INJECTION, SOLUTION INTRAMUSCULAR; INTRAVENOUS
Status: DISCONTINUED | OUTPATIENT
Start: 2020-02-10 | End: 2020-02-10 | Stop reason: HOSPADM

## 2020-02-10 RX ORDER — FAMOTIDINE 40 MG/1
40 TABLET, FILM COATED ORAL DAILY
Status: DISCONTINUED | OUTPATIENT
Start: 2020-02-10 | End: 2020-02-11 | Stop reason: HOSPADM

## 2020-02-10 RX ORDER — LIDOCAINE HYDROCHLORIDE 20 MG/ML
INJECTION, SOLUTION INFILTRATION; PERINEURAL AS NEEDED
Status: DISCONTINUED | OUTPATIENT
Start: 2020-02-10 | End: 2020-02-10 | Stop reason: SURG

## 2020-02-10 RX ORDER — PROMETHAZINE HYDROCHLORIDE 25 MG/ML
12.5 INJECTION, SOLUTION INTRAMUSCULAR; INTRAVENOUS ONCE AS NEEDED
Status: DISCONTINUED | OUTPATIENT
Start: 2020-02-10 | End: 2020-02-10 | Stop reason: HOSPADM

## 2020-02-10 RX ORDER — NALOXONE HCL 0.4 MG/ML
0.2 VIAL (ML) INJECTION AS NEEDED
Status: DISCONTINUED | OUTPATIENT
Start: 2020-02-10 | End: 2020-02-10 | Stop reason: HOSPADM

## 2020-02-10 RX ORDER — NALOXONE HCL 0.4 MG/ML
0.1 VIAL (ML) INJECTION
Status: DISCONTINUED | OUTPATIENT
Start: 2020-02-10 | End: 2020-02-11 | Stop reason: HOSPADM

## 2020-02-10 RX ORDER — PROMETHAZINE HYDROCHLORIDE 25 MG/1
25 SUPPOSITORY RECTAL ONCE AS NEEDED
Status: DISCONTINUED | OUTPATIENT
Start: 2020-02-10 | End: 2020-02-10 | Stop reason: HOSPADM

## 2020-02-10 RX ADMIN — PROPOFOL 200 MG: 10 INJECTION, EMULSION INTRAVENOUS at 07:07

## 2020-02-10 RX ADMIN — SODIUM CHLORIDE, POTASSIUM CHLORIDE, SODIUM LACTATE AND CALCIUM CHLORIDE: 600; 310; 30; 20 INJECTION, SOLUTION INTRAVENOUS at 08:41

## 2020-02-10 RX ADMIN — HYDROCODONE BITARTRATE AND ACETAMINOPHEN 2 TABLET: 10; 325 TABLET ORAL at 17:23

## 2020-02-10 RX ADMIN — CEFAZOLIN SODIUM 2 G: 2 INJECTION, SOLUTION INTRAVENOUS at 06:57

## 2020-02-10 RX ADMIN — ASPIRIN 81 MG: 81 TABLET, COATED ORAL at 20:09

## 2020-02-10 RX ADMIN — DEXAMETHASONE SODIUM PHOSPHATE 8 MG: 10 INJECTION INTRAMUSCULAR; INTRAVENOUS at 07:25

## 2020-02-10 RX ADMIN — CEFAZOLIN SODIUM 2 G: 2 INJECTION, SOLUTION INTRAVENOUS at 14:27

## 2020-02-10 RX ADMIN — ATORVASTATIN CALCIUM 40 MG: 20 TABLET, FILM COATED ORAL at 12:40

## 2020-02-10 RX ADMIN — ONDANSETRON HYDROCHLORIDE 4 MG: 2 SOLUTION INTRAMUSCULAR; INTRAVENOUS at 08:17

## 2020-02-10 RX ADMIN — FAMOTIDINE 40 MG: 40 TABLET, FILM COATED ORAL at 12:40

## 2020-02-10 RX ADMIN — ROCURONIUM BROMIDE 50 MG: 10 INJECTION, SOLUTION INTRAVENOUS at 07:07

## 2020-02-10 RX ADMIN — GLYCOPYRROLATE 0.6 MG: 0.2 INJECTION INTRAMUSCULAR; INTRAVENOUS at 08:40

## 2020-02-10 RX ADMIN — HYDROCODONE BITARTRATE AND ACETAMINOPHEN 1 TABLET: 10; 325 TABLET ORAL at 12:40

## 2020-02-10 RX ADMIN — FENTANYL CITRATE 100 MCG: 50 INJECTION INTRAMUSCULAR; INTRAVENOUS at 07:07

## 2020-02-10 RX ADMIN — LIDOCAINE HYDROCHLORIDE 80 MG: 20 INJECTION, SOLUTION INFILTRATION; PERINEURAL at 07:07

## 2020-02-10 RX ADMIN — SENNOSIDES AND DOCUSATE SODIUM 2 TABLET: 8.6; 5 TABLET ORAL at 12:40

## 2020-02-10 RX ADMIN — BUPROPION HYDROCHLORIDE 150 MG: 150 TABLET, EXTENDED RELEASE ORAL at 20:09

## 2020-02-10 RX ADMIN — NEOSTIGMINE METHYLSULFATE 4 MG: 1 INJECTION INTRAMUSCULAR; INTRAVENOUS; SUBCUTANEOUS at 08:40

## 2020-02-10 RX ADMIN — SODIUM CHLORIDE, POTASSIUM CHLORIDE, SODIUM LACTATE AND CALCIUM CHLORIDE 100 ML/HR: 600; 310; 30; 20 INJECTION, SOLUTION INTRAVENOUS at 12:40

## 2020-02-10 RX ADMIN — FENTANYL CITRATE 50 MCG: 50 INJECTION INTRAMUSCULAR; INTRAVENOUS at 07:35

## 2020-02-10 RX ADMIN — CEFAZOLIN SODIUM 2 G: 2 INJECTION, SOLUTION INTRAVENOUS at 21:12

## 2020-02-10 RX ADMIN — FENTANYL CITRATE 50 MCG: 50 INJECTION INTRAMUSCULAR; INTRAVENOUS at 07:53

## 2020-02-10 RX ADMIN — SODIUM CHLORIDE, POTASSIUM CHLORIDE, SODIUM LACTATE AND CALCIUM CHLORIDE 9 ML/HR: 600; 310; 30; 20 INJECTION, SOLUTION INTRAVENOUS at 06:21

## 2020-02-10 RX ADMIN — TRANEXAMIC ACID 1000 MG: 100 INJECTION, SOLUTION INTRAVENOUS at 07:10

## 2020-02-10 RX ADMIN — FAMOTIDINE 20 MG: 10 INJECTION INTRAVENOUS at 06:21

## 2020-02-10 RX ADMIN — HYDROCODONE BITARTRATE AND ACETAMINOPHEN 1 TABLET: 10; 325 TABLET ORAL at 13:23

## 2020-02-10 NOTE — H&P
"        ORTHOPEDIC SURGERY PRE-OP HISTORY AND PHYSICAL      Patient: Ellen Adamson  Date of Admission: 2/10/2020  5:07 AM  YOB: 1950  Medical Record Number: [id number]  Attending Physician: Jann Khan MD  Consulting Physician: Jann Khan MD    CHIEF COMPLIANT: Right Hip Pain.    HISTORY OF PRESENT ILLINESS: Patient is a 69 y.o. female presents to [facility] with above complaints.  The patient failed conservative treatment and patient requested surgical intervention.  The patient presents for a  Right total hip.    ALLERGIES:   Allergies   Allergen Reactions   • Iodine Nausea And Vomiting   • Shellfish-Derived Products Nausea And Vomiting       HOME MEDICATIONS:  Medications Prior to Admission   Medication Sig Dispense Refill Last Dose   • ALPRAZolam (XANAX) 1 MG tablet Take 1 mg by mouth 4 (Four) Times a Day As Needed.   2/10/2020 at 0330   • atorvastatin (LIPITOR) 40 MG tablet Take 40 mg by mouth Every Morning.   2/9/2020 at 0900   • buPROPion SR (WELLBUTRIN SR) 150 MG 12 hr tablet Take 150 mg by mouth 2 (Two) Times a Day.   Patient Taking Differently at 0900   • Chlorhexidine Gluconate 2 % pads Apply  topically. As directed pre op   2/10/2020 at 0330   • DULoxetine (CYMBALTA) 60 MG capsule Take 60 mg by mouth Every Morning.   2/10/2020 at 0330   • mupirocin (BACTROBAN NASAL) 2 % nasal ointment into the nostril(s) as directed by provider. As directed pre op   2/10/2020 at 0330   • vilazodone (VIIBRYD) 40 MG tablet tablet Take 40 mg by mouth Every Morning.   2/10/2020 at 0330   • HYDROcodone-acetaminophen (NORCO)  MG per tablet Take 1 tablet by mouth every 8 (eight) hours as needed for moderate pain (4-6).   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)     \"IN MY NOSE\"   • CKD (chronic kidney disease) 2019    STAGE 3   • COPD (chronic obstructive pulmonary " disease) (CMS/HCC)    • GERD (gastroesophageal reflux disease)    • Lumbar stenosis    • Right hip pain    • Sjogren's syndrome (CMS/HCC)      Past Surgical History:   Procedure Laterality Date   • ANKLE SURGERY      RIGHT   • APPENDECTOMY     • CHOLECYSTECTOMY     • COLONOSCOPY     • ELBOW PROCEDURE Left    • ENDOSCOPY     • HYSTERECTOMY       Social History     Occupational History   • Not on file   Tobacco Use   • Smoking status: Former Smoker     Packs/day: 1.00     Years: 25.00     Pack years: 25.00     Types: Cigarettes     Last attempt to quit: 10/16/2019     Years since quittin.3   Substance and Sexual Activity   • Alcohol use: Not Currently   • Drug use: Never   • Sexual activity: Defer      Social History     Social History Narrative   • Not on file     Family History   Problem Relation Age of Onset   • No Known Problems Mother    • No Known Problems Father    • Malig Hyperthermia Neg Hx        REVIEW OF SYSTEMS:    HEENT: Patient denies any headaches, vision changes, change in hearing, or tinnitus, Patient denies epistaxis, sinus pain, hoarseness, or dysphagia   Pulmonary: Patient denies any cough, congestion, acute change in SOA or wheezing.   Cardiovascular: Patient denies any change in chest pain, dyspnea, palpitations, weakness, intolerance of exercise, varicosities, change in murmur   Gastrointestinal:  Patient denies change in appetite, melena, change in bowel habits.   Genital/Urinary: Patient denies dysuria, change in color of urine, change in frequency of urination, pain with urgency, change in incontinence, retention.   Musculoskeletal: Patient denies complaints of acute changes in symptoms of other joints not mentioned above.   Neurological: Patient denies changes in dizziness, tremor, ataxia, or difficulty in speaking or changes in memory.   Endocrine system: Patient denies acute changes in tremors, palpitations, polyuria, polydipsia, polyphagia, diaphoresis, exophthalmos, or  "goiter.   Psychological: Patient denies thoughts/plans or harming self or other; denies acute changes in depression,  insomnia, night terrors, netta, disorientation.   Skin: Patient denies any bruising, rashes, discoloration, pruritus,or wounds not mentioned in history of present illness or chief complaint above.   Hematopoietic: Patient denies current bleeding, epistaxis, hematuria, or melena.    PHYSICAL EXAM:   Vitals:  Vitals:    02/10/20 0534 02/10/20 0546   BP:  124/43   BP Location:  Left arm   Pulse:  90   Resp:  20   Temp: 98.2 °F (36.8 °C)    TempSrc: Oral    SpO2:  96%   Weight: 90.6 kg (199 lb 11.8 oz)    Height: 162.6 cm (64\")        General:  69 y.o. female who appears about stated age.    Alert, cooperative, in no acute distress                       Head:    Normocephalic, without obvious abnormality, atraumatic   Eyes:            Lids and lashes normal, conjunctivae and sclerae normal, no         icterus, no pallor, corneas clear, PERRLA   Ears:    Ears appear intact with no abnormalities noted   Throat:   No oral lesions, no thrush, oral mucosa moist   Neck:   No adenopathy, supple, trachea midline, no JVD   Back:     Limited exam shows no severe kyphosis present,no visible           erythema, no excessive  tenderness to palpation.    Lungs:     Respirations regular, even and unlabored.     Heart:    Normal rate, Pulses palpable   Chest Wall:    No abnormalities observed.   Abdomen:     Normal bowel sounds, no masses, no organomegaly, soft              non-tender, non-distended, no guarding, no rebound                      tenderness   Rectal:     Deferred   Pulses:   Pulses palpable and equal bilaterally   Skin:   No bleeding, bruising or rash   Lymph nodes:   No palpable adenopathy   Extremities:     Right Hip: Skin intact.  Painful ROM.  NVI distally.      DIAGNOSTIC TEST:  No visits with results within 2 Day(s) from this visit.   Latest known visit with results is:   Appointment on 02/04/2020 "   Component Date Value Ref Range Status   • WBC 02/04/2020 11.22* 3.40 - 10.80 10*3/mm3 Final   • RBC 02/04/2020 4.25  3.77 - 5.28 10*6/mm3 Final   • Hemoglobin 02/04/2020 12.0  12.0 - 15.9 g/dL Final   • Hematocrit 02/04/2020 37.0  34.0 - 46.6 % Final   • MCV 02/04/2020 87.1  79.0 - 97.0 fL Final   • MCH 02/04/2020 28.2  26.6 - 33.0 pg Final   • MCHC 02/04/2020 32.4  31.5 - 35.7 g/dL Final   • RDW 02/04/2020 14.5  12.3 - 15.4 % Final   • RDW-SD 02/04/2020 46.5  37.0 - 54.0 fl Final   • MPV 02/04/2020 9.8  6.0 - 12.0 fL Final   • Platelets 02/04/2020 357  140 - 450 10*3/mm3 Final   • Glucose 02/04/2020 93  65 - 99 mg/dL Final   • BUN 02/04/2020 17  8 - 23 mg/dL Final   • Creatinine 02/04/2020 1.01* 0.57 - 1.00 mg/dL Final   • Sodium 02/04/2020 140  136 - 145 mmol/L Final   • Potassium 02/04/2020 4.0  3.5 - 5.2 mmol/L Final   • Chloride 02/04/2020 102  98 - 107 mmol/L Final   • CO2 02/04/2020 24.2  22.0 - 29.0 mmol/L Final   • Calcium 02/04/2020 9.3  8.6 - 10.5 mg/dL Final   • eGFR Non African Amer 02/04/2020 54* >60 mL/min/1.73 Final   • BUN/Creatinine Ratio 02/04/2020 16.8  7.0 - 25.0 Final   • Anion Gap 02/04/2020 13.8  5.0 - 15.0 mmol/L Final   • Urine Culture 02/04/2020 No growth   Final   • Color, UA 02/04/2020 Yellow  Yellow, Straw Final   • Appearance, UA 02/04/2020 Clear  Clear Final   • pH, UA 02/04/2020 <=5.0  5.0 - 8.0 Final   • Specific Gravity, UA 02/04/2020 1.018  1.005 - 1.030 Final   • Glucose, UA 02/04/2020 Negative  Negative Final   • Ketones, UA 02/04/2020 Negative  Negative Final   • Bilirubin, UA 02/04/2020 Negative  Negative Final   • Blood, UA 02/04/2020 Negative  Negative Final   • Protein, UA 02/04/2020 Negative  Negative Final   • Leuk Esterase, UA 02/04/2020 Negative  Negative Final   • Nitrite, UA 02/04/2020 Negative  Negative Final   • Urobilinogen, UA 02/04/2020 0.2 E.U./dL  0.2 - 1.0 E.U./dL Final   • RBC, UA 02/04/2020 0-2  None Seen, 0-2 /HPF Final   • WBC, UA 02/04/2020 0-2  None  Seen, 0-2 /HPF Final   • Bacteria, UA 02/04/2020 None Seen  None Seen /HPF Final   • Squamous Epithelial Cells, UA 02/04/2020 0-2  None Seen, 0-2 /HPF Final   • Hyaline Casts, UA 02/04/2020 None Seen  None Seen /LPF Final   • Methodology 02/04/2020 Automated Microscopy   Final       No results found.      ASSESSMENT:  Right Hip Osteoarthritis  Patient Active Problem List   Diagnosis   • Osteoarthritis of right hip       PLAN:    Right total hip arthroplasty, anterior approach.    Risks and benefits of surgical intervention were discussed in detail with the patient.  Risks of infection, fracture, dislocation, extremity length discrepancy, neurovascular injury, persistent pain, medical risks, anesthetic risk, need for additional surgery, deep venous thrombosis, pulmonary embolism and death.      The above diagnosis and treatment plan was discussed with the patient and/or family.  They were educated in both non-surgical and surgical treatment options for their condition.   They were given the opportunity to ask questions and were answered to their satisfaction.  They agreed to proceed with the above treatment plan.        Jann Khan MD  Date: 2/10/2020

## 2020-02-10 NOTE — OP NOTE
TOTAL HIP ARTHROPLASTY ANTERIOR WITH HANA TABLE  Procedure Note    Ellen Adamson  2/10/2020    Pre-op Diagnosis: Right Hip Osteoarthritis Primary  Post-op Diagnosis: Same  Procedure:  Anterior approach Right  Total Hip Arthroplasty  Surgeon: Jann Khan M.D.  First Assistantt: Landen Keith M.D.   Second Assistant: Anthony Ta PA-C  Anesthesia: General, Anesthesiologist: Raheel Roberts MD  CRNA: Cortney Case CRNA  Staff: Circulator: Sunday Red RN  : Murtaza De  Radiology Technologist: Linda Berry, RRT  Scrub Person: Vern Henriquez  Vendor Representative: Timoteo Gant  Estimated Blood Loss:100ml   Specimens: * No orders in the log *  Drains: none  Complications: None    Components Utilized:     Implant Name Type Inv. Item Serial No.  Lot No. LRB No. Used   G7 VIT E 32MM LINER HIGH WALL POLY    Abhay [id number] Right 1   SHLL ACET OSSEOTI G7 3H SZC 48MM - [id number] Implant SHLL ACET OSSEOTI G7 3H SZC 48MM  [employer] [id number] Right 1   STEM FEM TPR RED/NK EXT OFFST M/L SZ12.5 - [id number] Implant STEM FEM TPR RED/NK EXT OFFST M/L SZ12.5  [employer] [id number] Right 1   HD FEM BIOLOXDELTA 12/14 32 MIN3.5 - [id number] Implant HD FEM BIOLOXDELTA 12/14 32 MIN3.5  [employer] [id number] Right 1       Indication for Procedure:   The patient is a 69 y.o. female presents today for total hip arthroplasty  procedure because of failure to conservatively manage the patient's pain. The patient was educated in risks of surgery that could include possible risk of infection, deep venous thrombosis, pulmonary embolism, fracture, neurovascular injury, leg length discrepancy, dislocation, possible persistent pain, need for additional surgeries, anesthetic risks, medical risks including heart attack and stroke, and death.  The discussion occurred in the office pre-operatively, and patient had the opportunity to ask questions, and concerns about the proposed  surgery.  The patient also understood that medicine is not an exact science, and that outcomes of the surgical procedure may be less than desired. The patient wished to proceed.      Protocols for intravenous antibiotics and venous thrombosis were followed for this patient.  IV antibiotics were infused prior to surgery and will be discontinued within 24 hours of completion of the surgical procedure.  Thrombosis prophylaxis will be initiated within 24 hours of the completion of the surgical procedure.      Procedure:   After the patient was identified in the preoperative area, and the surgical site confirmed and marked, the patient was brought to the operating room on a stretcher.  The above anesthetic was placed uneventfully on the stretcher and the patient was transferred to the [place] operating room table.  A time-out procedure was performed.  The intravenous ancef infusion was completed. The operative leg was then prepped and draped in usual sterile fashion.     A 10 blade scalpel was then used to make an anterior based incision over the operative hip.  The tensor fascia rashida fascia was opened longitudinally and the tensor fascia rashida was mobilized laterally and sartorius muscle medially.  The anterior hip capsule was then opened and excised.  The femoral neck osteotomy was completed with a reciprocating saw.  The acetabular labrum was excised and the pulvinar was removed.  The femoral head was measured, and acetabular reaming commenced 2 mm smaller than the measured femoral head size. Reaming was performed under C-arm guidance and was used to ream to the medial wall and base of teardrop.  Reaming continued until concentric reaming was performed and good back bleeding was visualized in the floor and rim of the acetabulum.  Then the above G-7 cup was impacted, again under C-arm guidance until it was well seated.  The dome hole plug was then placed into the acetabular component.  Then the acetabular liner was placed  with the elevated liner placed posteriorly and was impacted.  It was confirmed to be well seated.     Then the femoral retractors were placed and the piriformis and posterior capsule was released.  The femur was subluxed anterior and the  was used to open up the intramedullary canal.  The rattail rasp was inserted to further lateralize the medullary canal.  Then the starter broach was inserted, and sequential larger sizes were used until a tight fit was palpated and cortical chatter was heard.  Trial neck and head balls were placed and the hip was reduced. The hip was checked for stability both anterior and posteriorly and laterally using a bone hook. C-arm was used to confirm correct implant position and size and leg length.  It was noted to be a few mm long, though anything shorter was unstable.  The trial implants were removed and the final implants were impacted into place.  The hip was reduced and was copiously irrigated with a 3 liter mixture of betadine and normal saline.  The wound was closed in layers with 0 Vicryl used to close the TFL fascia, 2-0 Vicryl to close the deep dermis, and 3-0 monocryl to close the skin.  Skin glue was applied and sterile dressings were placed.    Sponge and needle counts were completed and were found to be correct.  The patient was awakened from the anesthetic and was brought into the recovery room in stable condition.      Jann Khan MD     Date: 2/10/2020  Time: 8:22 AM

## 2020-02-10 NOTE — ANESTHESIA PROCEDURE NOTES
Airway  Urgency: elective    Date/Time: 2/10/2020 7:08 AM  Airway not difficult    General Information and Staff    Patient location during procedure: OR  Anesthesiologist: Raheel Roberts MD  CRNA: Cortney Case CRNA    Indications and Patient Condition  Indications for airway management: airway protection    Preoxygenated: yes  MILS not maintained throughout  Mask difficulty assessment: 1 - vent by mask    Final Airway Details  Final airway type: endotracheal airway      Successful airway: ETT  Cuffed: yes   Successful intubation technique: direct laryngoscopy  Facilitating devices/methods: intubating stylet  Endotracheal tube insertion site: oral  Blade: Patricio  Blade size: 3  ETT size (mm): 7.0  Cormack-Lehane Classification: grade I - full view of glottis  Placement verified by: chest auscultation   Cuff volume (mL): 8  Measured from: lips  ETT/EBT  to lips (cm): 21  Number of attempts at approach: 1  Assessment: lips, teeth, and gum same as pre-op and atraumatic intubation    Additional Comments  PreO2 100% face mask, IV induction, easy mask, DVL x1, cords noted, tube through, cuff up, EBBSH, +etCO2, = chest movement, tube secured in place, atraumatic, no teeth and lips intact as preop.

## 2020-02-10 NOTE — PLAN OF CARE
Problem: Patient Care Overview  Goal: Plan of Care Review  Outcome: Ongoing (interventions implemented as appropriate)  Flowsheets  Taken 2/10/2020 1556 by Elmo Hull, RN  Progress: improving  Outcome Summary: S/P R ADALBERTO anterior approach. Admitted to room 895 today from PACU. A&Ox4, but drowsy on admission. Stated she had some pain, but when asked pain level patient would fall asleep in middle of conversation. Once more awake and working with therapy. C/O pain of 9/10, but d/t drowsiness, gave one norco 10. Tolerated it well, but stated pain was still 8/10. Another norco given and tolerated. States pain is much better. Patient is still drowsy at times, but much more awake at this time. Up with assistance. Up to bathroom. Up to chair with assistance. Dressing c/d/i. Good sensation and motion to ble. Educated on use of IS, fall precautions, pain management, and BP monitoring. Voiding on her own. Orientated to room and call light. VSS.  Taken 2/10/2020 1258 by Concepcion Neville PT  Plan of Care Reviewed With: patient;family

## 2020-02-10 NOTE — ANESTHESIA POSTPROCEDURE EVALUATION
"Patient: Ellen Adamson    Procedure Summary     Date:  02/10/20 Room / Location:  [facility] OR [address] [place] [address] MAIN OR    Anesthesia Start:  0657 Anesthesia Stop:  0903    Procedure:  TOTAL HIP ARTHROPLASTY ANTERIOR WITH HANA TABLE (Right Hip) Diagnosis:      Surgeon:  Jann Khan MD Provider:  Raheel Roberts MD    Anesthesia Type:  general ASA Status:  3          Anesthesia Type: general    Vitals  Vitals Value Taken Time   /64 2/10/2020 10:00 AM   Temp 36.9 °C (98.4 °F) 2/10/2020  9:00 AM   Pulse 97 2/10/2020 10:02 AM   Resp 12 2/10/2020  9:45 AM   SpO2 93 % 2/10/2020 10:02 AM   Vitals shown include unvalidated device data.        Post Anesthesia Care and Evaluation    Patient location during evaluation: bedside  Patient participation: complete - patient participated  Level of consciousness: awake and alert  Pain management: adequate  Airway patency: patent  Anesthetic complications: No anesthetic complications    Cardiovascular status: acceptable  Respiratory status: acceptable  Hydration status: acceptable    Comments: /65   Pulse 92   Temp 36.9 °C (98.4 °F)   Resp 12   Ht 162.6 cm (64\")   Wt 90.6 kg (199 lb 11.8 oz)   SpO2 94%   BMI 34.28 kg/m²       "

## 2020-02-10 NOTE — THERAPY EVALUATION
"Patient Name: Ellen Adamson  : 1950    MRN: [id number]                              Today's Date: 2/10/2020       Admit Date: 2/10/2020    Visit Dx: No diagnosis found.  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)     \"IN MY NOSE\"   • CKD (chronic kidney disease) 2019    STAGE 3   • COPD (chronic obstructive pulmonary disease) (CMS/HCC)    • GERD (gastroesophageal reflux disease)    • Lumbar stenosis    • Right hip pain    • Sjogren's syndrome (CMS/HCC)      Past Surgical History:   Procedure Laterality Date   • ANKLE SURGERY      RIGHT   • APPENDECTOMY     • CHOLECYSTECTOMY     • COLONOSCOPY     • ELBOW PROCEDURE Left    • ENDOSCOPY     • HYSTERECTOMY       General Information     Row Name 02/10/20 1232          PT Evaluation Time/Intention    Document Type  evaluation  -     Mode of Treatment  individual therapy;physical therapy  -     Row Name 02/10/20 1232          General Information    Prior Level of Function  independent:  -     Existing Precautions/Restrictions  fall  -     Barriers to Rehab  none identified  -     Row Name 02/10/20 1232          Relationship/Environment    Lives With  spouse  -     Row Name 02/10/20 1232          Resource/Environmental Concerns    Current Living Arrangements  home/apartment/condo  -     Row Name 02/10/20 1232          Home Main Entrance    Number of Stairs, Main Entrance  five  -     Row Name 02/10/20 1232          Cognitive Assessment/Intervention- PT/OT    Orientation Status (Cognition)  oriented x 4  -     Row Name 02/10/20 1232          Safety Issues, Functional Mobility    Impairments Affecting Function (Mobility)  endurance/activity tolerance;pain;range of motion (ROM)  -       User Key  (r) = Recorded By, (t) = Taken By, (c) = Cosigned By    Initials Name " Provider Type    Concepcion Goss, PT Physical Therapist        Mobility     Row Name 02/10/20 1233          Bed Mobility Assessment/Treatment    Bed Mobility Assessment/Treatment  bed mobility (all) activities  -     Hettinger Level (Bed Mobility)  contact guard assist  -     Assistive Device (Bed Mobility)  bed rails;head of bed elevated  -     Row Name 02/10/20 1257          Sit-Stand Transfer    Sit-Stand Hettinger (Transfers)  moderate assist (50% patient effort)  -     Assistive Device (Sit-Stand Transfers)  walker, front-wheeled  -     Row Name 02/10/20 1257          Gait/Stairs Assessment/Training    Hettinger Level (Gait)  minimum assist (75% patient effort)  -     Assistive Device (Gait)  walker, front-wheeled  -     Distance in Feet (Gait)  5  -     Deviations/Abnormal Patterns (Gait)  antalgic;gait speed decreased;stride length decreased  -     Bilateral Gait Deviations  weight shift ability decreased;forward flexed posture  -     Comment (Gait/Stairs)  ambulation distance limited by pain and WB tolerance  -       User Key  (r) = Recorded By, (t) = Taken By, (c) = Cosigned By    Initials Name Provider Type    Concepcion Goss, PT Physical Therapist        Obj/Interventions     Row Name 02/10/20 1258          General ROM    GENERAL ROM COMMENTS  BLE WFL except R hip  -     Row Name 02/10/20 1258          MMT (Manual Muscle Testing)    General MMT Comments  BLE WFl  -     Row Name 02/10/20 1258          Therapeutic Exercise    Comment (Therapeutic Exercise)  R ADALBERTO protocol x 10 reps  -       User Key  (r) = Recorded By, (t) = Taken By, (c) = Cosigned By    Initials Name Provider Type    Concepcion Goss PT Physical Therapist        Goals/Plan     Row Name 02/10/20 1259          Bed Mobility Goal 1 (PT)    Activity/Assistive Device (Bed Mobility Goal 1, PT)  bed mobility activities, all  -     Hettinger Level/Cues Needed (Bed Mobility  Goal 1, PT)  independent  -KH     Time Frame (Bed Mobility Goal 1, PT)  3 days  -     Row Name 02/10/20 1259          Transfer Goal 1 (PT)    Activity/Assistive Device (Transfer Goal 1, PT)  transfers, all;walker, rolling  -KH     Esmeralda Level/Cues Needed (Transfer Goal 1, PT)  supervision required  -KH     Time Frame (Transfer Goal 1, PT)  3 days  -     Row Name 02/10/20 1259          Gait Training Goal 1 (PT)    Activity/Assistive Device (Gait Training Goal 1, PT)  gait (walking locomotion);walker, rolling  -KH     Esmeralda Level (Gait Training Goal 1, PT)  supervision required  -KH     Time Frame (Gait Training Goal 1, PT)  3 days  -     Row Name 02/10/20 1259          Stairs Goal 1 (PT)    Activity/Assistive Device (Stairs Goal 1, PT)  stairs, all skills  -KH     Esmeralda Level/Cues Needed (Stairs Goal 1, PT)  contact guard assist  -     Number of Stairs (Stairs Goal 1, PT)  4  -KH     Time Frame (Stairs Goal 1, PT)  3 days  -     Row Name 02/10/20 1259          Patient Education Goal (PT)    Activity (Patient Education Goal, PT)  HEP  -KH     Esmeralda/Cues/Accuracy (Memory Goal 2, PT)  demonstrates adequately  -KH     Time Frame (Patient Education Goal, PT)  3 days  -       User Key  (r) = Recorded By, (t) = Taken By, (c) = Cosigned By    Initials Name Provider Type    Concepcion Goss, PT Physical Therapist        Clinical Impression     [facility] Name 02/10/20 1258          Pain Assessment    Additional Documentation  Pain Scale: Numbers Pre/Post-Treatment (Group)  -[facility] Name 02/10/20 1258          Pain Scale: Numbers Pre/Post-Treatment    Pain Scale: Numbers, Pretreatment  8/10  -     Pain Scale: Numbers, Post-Treatment  9/10  -KH     Pain Location - Side  Right  -     Pain Location  hip  -     Pain Intervention(s)  Repositioned;Cold applied;Ambulation/increased activity  -[facility] Name 02/10/20 1259          Plan of Care Review    Plan of Care Reviewed With   patient;family  -     Row Name 02/10/20 1258          Physical Therapy Clinical Impression    Patient/Family Goals Statement (PT Clinical Impression)  return home to PLOF  -     Criteria for Skilled Interventions Met (PT Clinical Impression)  treatment indicated  -     Rehab Potential (PT Clinical Summary)  good, to achieve stated therapy goals  -     Row Name 02/10/20 1258          Positioning and Restraints    Pre-Treatment Position  in bed  -KH     Post Treatment Position  chair  -     In Chair  call light within reach;encouraged to call for assist;with family/caregiver;exit alarm on;notified nsg  -       User Key  (r) = Recorded By, (t) = Taken By, (c) = Cosigned By    Initials Name Provider Type    Concepcion Goss, PT Physical Therapist        Outcome Measures     Row Name 02/10/20 1300          How much help from another person do you currently need...    Turning from your back to your side while in flat bed without using bedrails?  3  -KH     Moving from lying on back to sitting on the side of a flat bed without bedrails?  3  -KH     Moving to and from a bed to a chair (including a wheelchair)?  3  -KH     Standing up from a chair using your arms (e.g., wheelchair, bedside chair)?  2  -KH     Climbing 3-5 steps with a railing?  1  -KH     To walk in hospital room?  3  -KH     AM-PAC 6 Clicks Score (PT)  15  -     Row Name 02/10/20 1300          Functional Assessment    Outcome Measure Options  AM-PAC 6 Clicks Basic Mobility (PT)  -       User Key  (r) = Recorded By, (t) = Taken By, (c) = Cosigned By    Initials Name Provider Type    Concepcion Goss, PT Physical Therapist          PT Recommendation and Plan  Planned Therapy Interventions (PT Eval): balance training, bed mobility training, gait training, home exercise program, ROM (range of motion), stretching, strengthening, patient/family education, stair training  Outcome Summary/Treatment Plan (PT)  Anticipated  Discharge Disposition (PT): home with home health  Plan of Care Reviewed With: patient, family  Outcome Summary: Pt is s/p R ADALBERTO-ant approach and presents with pain, limited ROM and difficulty walking. Pt would benefit from PT to address these impairments. Pt plans to d./c home with her spouse and has equipment at home. Ambualtion distance was limited by pain and limited WB tolerance today.     Time Calculation:   PT Charges     Row Name 02/10/20 1230             Time Calculation    Start Time  1225  -      Stop Time  1248  -KH      Time Calculation (min)  23 min  -      PT Received On  02/10/20  -      PT - Next Appointment  02/11/20  -      PT Goal Re-Cert Due Date  02/13/20  -         Time Calculation- PT    Total Timed Code Minutes- PT  12 minute(s)  -        User Key  (r) = Recorded By, (t) = Taken By, (c) = Cosigned By    Initials Name Provider Type    Concepcion Goss, PT Physical Therapist        Therapy Charges for Today     Code Description Service Date Service Provider Modifiers Qty    17159336839 HC PT EVAL MOD COMPLEXITY 2 2/10/2020 Concepcion Neville, PT GP 1    84115024368 HC PT THER PROC EA 15 MIN 2/10/2020 Concepcion Neville, PT GP 1          PT G-Codes  Outcome Measure Options: AM-PAC 6 Clicks Basic Mobility (PT)  AM-PAC 6 Clicks Score (PT): 15    Concepcion Neville PT  2/10/2020

## 2020-02-10 NOTE — PLAN OF CARE
Problem: Patient Care Overview  Goal: Plan of Care Review  Flowsheets (Taken 2/10/2020 1301)  Outcome Summary: Pt is s/p R ADALBERTO-ant approach and presents with pain, limited ROM and difficulty walking. Pt would benefit from PT to address these impairments. Pt plans to d./c home with her spouse and has equipment at home. Ambualtion distance was limited by pain and limited WB tolerance today.

## 2020-02-10 NOTE — PROGRESS NOTES
Discharge Planning Assessment  [facility]     Patient Name: Ellen Adamson  MRN: [id number]  Today's Date: 2/10/2020    Admit Date: 2/10/2020    Discharge Needs Assessment     Row Name 02/10/20 1428       Living Environment    Lives With  spouse    Name(s) of Who Lives With Patient  Ramírez Adamson [phone]    Current Living Arrangements  home/apartment/condo    Potentially Unsafe Housing Conditions  other (see comments) No concerns    Primary Care Provided by  self    Provides Primary Care For  no one    Family Caregiver if Needed  spouse    Quality of Family Relationships  helpful;involved;supportive    Able to Return to Prior Arrangements  yes       Resource/Environmental Concerns    Resource/Environmental Concerns  home accessibility    Home Accessibility Concerns  stairs to enter home    Transportation Concerns  car, none       Transition Planning    Patient/Family Anticipates Transition to  home with help/services    Patient/Family Anticipated Services at Transition  home health care    Transportation Anticipated  family or friend will provide       Discharge Needs Assessment    Readmission Within the Last 30 Days  no previous admission in last 30 days    Concerns to be Addressed  discharge planning    Equipment Currently Used at Home  cane, straight;walker, rolling    Anticipated Changes Related to Illness  none    Equipment Needed After Discharge  none    Outpatient/Agency/Support Group Needs  homecare agency    Discharge Facility/Level of Care Needs  home with home health    Provided post acute provider list?  Yes    Post Acute Provider List  Nursing Home;Home Health    Post Acute Provider Quality & Resource List  Yes    Delivered To  Patient    Method of Delivery  In person    Current Discharge Risk  physical impairment        Discharge Plan     Row Name 02/10/20 1429       Plan    Plan  Follow up with patient for selection of home health agency     Patient/Family in Agreement with Plan  yes    Plan  Comments  CCP met with patient at bedside. CCP role explained and discharge planning discussed. Face sheet verified and IMM noted. Patient's PCP is Dr. Grant Layne. Patient lives with her spouse Ramírez Delacruz ([phone]) in a single level home with 5 steps at the entrance. Patient uses a walker and cane at home and is independent with her ADLs. Patient denies past home health or subacute rehab stays. Patient plans to discharge home with home health. CCP provided HH/SNF list and printout of Medicare.gov ratings for home health agencies. Patient states she has a friend who works for a home health agency and she wants to contact her before making a decision. Patient requested CCP follow up regarding which home health agency to select at a later time. Patient confirms pharmacy is [facility] and is enrolled in meds to [facility]. CCP will follow up with patient for home health agency selection and follow to assist with discharge home with home health. Bernarda Martinez [facility]        Destination      Coordination has not been started for this encounter.      Durable Medical Equipment      Coordination has not been started for this encounter.      Dialysis/Infusion      Coordination has not been started for this encounter.      Home Medical Care      Coordination has not been started for this encounter.      Therapy      Coordination has not been started for this encounter.      Community Resources      Coordination has not been started for this encounter.          Demographic Summary     Row Name 02/10/20 1429       General Information    Admission Type  inpatient    Arrived From  home    Required Notices Provided  Important Message from Medicare    Referral Source  admission list    Reason for Consult  discharge planning    Preferred Language  English     Used During This Interaction  no        Functional Status     Row Name 02/10/20 1424       Functional Status    Usual Activity Tolerance  good     Current Activity Tolerance  moderate       Functional Status, IADL    Medications  independent    Meal Preparation  independent    Housekeeping  independent    Laundry  independent    Shopping  independent       Mental Status    General Appearance WDL  WDL       Mental Status Summary    Recent Changes in Mental Status/Cognitive Functioning  no changes        Psychosocial    No documentation.       Abuse/Neglect    No documentation.       Legal    No documentation.       Substance Abuse    No documentation.       Patient Forms    No documentation.           Bernarda Martinez

## 2020-02-10 NOTE — ANESTHESIA PREPROCEDURE EVALUATION
Anesthesia Evaluation     no history of anesthetic complications:  NPO Solid Status: > 8 hours  NPO Liquid Status: > 2 hours           Airway   Mallampati: III  Neck ROM: limited  Dental    (+) lower dentures and upper dentures    Pulmonary - normal exam   (+) COPD mild,   Cardiovascular - normal exam        Neuro/Psych  (+) psychiatric history Depression and Anxiety,     GI/Hepatic/Renal/Endo    (+) obesity,   renal disease CRI,     Musculoskeletal     Abdominal    Substance History      OB/GYN          Other                        Anesthesia Plan    ASA 3     general   (Sjogrens synd.  Bun/Creat -WNL)  intravenous induction     Anesthetic plan, all risks, benefits, and alternatives have been provided, discussed and informed consent has been obtained with: patient.

## 2020-02-11 VITALS
RESPIRATION RATE: 16 BRPM | HEIGHT: 64 IN | OXYGEN SATURATION: 95 % | TEMPERATURE: 98.2 F | BODY MASS INDEX: 34.1 KG/M2 | WEIGHT: 199.74 LBS | DIASTOLIC BLOOD PRESSURE: 63 MMHG | HEART RATE: 110 BPM | SYSTOLIC BLOOD PRESSURE: 125 MMHG

## 2020-02-11 LAB
ANION GAP SERPL CALCULATED.3IONS-SCNC: 9.6 MMOL/L (ref 5–15)
BUN BLD-MCNC: 16 MG/DL (ref 8–23)
BUN/CREAT SERPL: 15.8 (ref 7–25)
CALCIUM SPEC-SCNC: 8.7 MG/DL (ref 8.6–10.5)
CHLORIDE SERPL-SCNC: 102 MMOL/L (ref 98–107)
CO2 SERPL-SCNC: 24.4 MMOL/L (ref 22–29)
CREAT BLD-MCNC: 1.01 MG/DL (ref 0.57–1)
GFR SERPL CREATININE-BSD FRML MDRD: 54 ML/MIN/1.73
GLUCOSE BLD-MCNC: 147 MG/DL (ref 65–99)
HCT VFR BLD AUTO: 31 % (ref 34–46.6)
HGB BLD-MCNC: 10 G/DL (ref 12–15.9)
POTASSIUM BLD-SCNC: 4.7 MMOL/L (ref 3.5–5.2)
SODIUM BLD-SCNC: 136 MMOL/L (ref 136–145)

## 2020-02-11 PROCEDURE — 85018 HEMOGLOBIN: CPT | Performed by: ORTHOPAEDIC SURGERY

## 2020-02-11 PROCEDURE — 97150 GROUP THERAPEUTIC PROCEDURES: CPT | Performed by: PHYSICAL THERAPIST

## 2020-02-11 PROCEDURE — 85014 HEMATOCRIT: CPT | Performed by: ORTHOPAEDIC SURGERY

## 2020-02-11 PROCEDURE — 97110 THERAPEUTIC EXERCISES: CPT | Performed by: PHYSICAL THERAPIST

## 2020-02-11 PROCEDURE — 80048 BASIC METABOLIC PNL TOTAL CA: CPT | Performed by: ORTHOPAEDIC SURGERY

## 2020-02-11 RX ORDER — SENNA AND DOCUSATE SODIUM 50; 8.6 MG/1; MG/1
2 TABLET, FILM COATED ORAL 2 TIMES DAILY
Qty: 60 TABLET | Refills: 1 | Status: SHIPPED | OUTPATIENT
Start: 2020-02-11 | End: 2020-02-17

## 2020-02-11 RX ORDER — HYDROCODONE BITARTRATE AND ACETAMINOPHEN 10; 325 MG/1; MG/1
1-2 TABLET ORAL EVERY 4 HOURS PRN
Qty: 56 TABLET | Refills: 0 | Status: SHIPPED | OUTPATIENT
Start: 2020-02-11 | End: 2020-02-20 | Stop reason: HOSPADM

## 2020-02-11 RX ORDER — ASPIRIN 81 MG/1
81 TABLET ORAL EVERY 12 HOURS SCHEDULED
Qty: 60 TABLET | Refills: 0 | Status: SHIPPED | OUTPATIENT
Start: 2020-02-11 | End: 2020-02-17

## 2020-02-11 RX ADMIN — HYDROCODONE BITARTRATE AND ACETAMINOPHEN 2 TABLET: 10; 325 TABLET ORAL at 03:58

## 2020-02-11 RX ADMIN — DULOXETINE HYDROCHLORIDE 60 MG: 60 CAPSULE, DELAYED RELEASE ORAL at 06:14

## 2020-02-11 RX ADMIN — FAMOTIDINE 40 MG: 40 TABLET, FILM COATED ORAL at 08:38

## 2020-02-11 RX ADMIN — HYDROCODONE BITARTRATE AND ACETAMINOPHEN 2 TABLET: 10; 325 TABLET ORAL at 08:38

## 2020-02-11 RX ADMIN — HYDROCODONE BITARTRATE AND ACETAMINOPHEN 2 TABLET: 10; 325 TABLET ORAL at 00:17

## 2020-02-11 RX ADMIN — ATORVASTATIN CALCIUM 40 MG: 20 TABLET, FILM COATED ORAL at 06:14

## 2020-02-11 RX ADMIN — BUPROPION HYDROCHLORIDE 150 MG: 150 TABLET, EXTENDED RELEASE ORAL at 08:37

## 2020-02-11 RX ADMIN — HYDROCODONE BITARTRATE AND ACETAMINOPHEN 2 TABLET: 10; 325 TABLET ORAL at 12:40

## 2020-02-11 RX ADMIN — SENNOSIDES AND DOCUSATE SODIUM 2 TABLET: 8.6; 5 TABLET ORAL at 08:37

## 2020-02-11 RX ADMIN — ASPIRIN 81 MG: 81 TABLET, COATED ORAL at 08:37

## 2020-02-11 RX ADMIN — Medication 3 ML: at 08:37

## 2020-02-11 RX ADMIN — VILAZODONE HYDROCHLORIDE 40 MG: 40 TABLET ORAL at 06:14

## 2020-02-11 NOTE — PLAN OF CARE
Problem: Patient Care Overview  Goal: Plan of Care Review  Flowsheets (Taken 2/11/2020 1226)  Outcome Summary: Pt is progressing well with HEP, ROM and gait training. Pt is safe to d/c home when medically stable. PT will sign off.

## 2020-02-11 NOTE — DISCHARGE SUMMARY
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 female admitted after undergoing the above surgical procedure. They were admitted for post-operative pain control, medical management and physical therapy.  They progressed with physical therapy.   They were deemed stable for discharge.      Consults:   Consults     No orders found for last 30 day(s).          Discharge Instructions:  Patient is weight bearing as tolerated on the operative leg.  Patient has anterior hip dislocation precautions in effect for 6 weeks post-op.  No external rotation past 45 degrees and no extension past 20 degrees.  Patient is to progress ambulation as tolerated.  Use walker as needed for stability and gait.  May progress to cane as tolerated.  The dressing is waterproof, and the patient may shower.  Keep dressing in place at least 7 days. May change dressing before saturated or starts to fall off.  Patient will follow-up in the office in 10-14 days. Home health physical therapy will follow patient once patient is discharged home.   Call the office at [phone] for any questions or concerns.      Discharge Medications     Discharge Medications      New Medications      Instructions Start Date   aspirin 81 MG EC tablet   81 mg, Oral, Every 12 Hours " Scheduled      senna-docusate sodium 8.6-50 MG tablet  Commonly known as:  SENOKOT-S   2 tablets, Oral, 2 Times Daily         Changes to Medications      Instructions Start Date   HYDROcodone-acetaminophen  MG per tablet  Commonly known as:  NORCO  What changed:    · how much to take  · when to take this  · reasons to take this   1-2 tablets, Oral, Every 4 Hours PRN         Continue These Medications      Instructions Start Date   ALPRAZolam 1 MG tablet  Commonly known as:  XANAX   1 mg, Oral, 4 Times Daily PRN      atorvastatin 40 MG tablet  Commonly known as:  LIPITOR   40 mg, Oral, Every Morning      buPROPion  MG 12 hr tablet  Commonly known as:  WELLBUTRIN SR   150 mg, Oral, 2 Times Daily      DULoxetine 60 MG capsule  Commonly known as:  CYMBALTA   60 mg, Oral, Every Morning      vilazodone 40 MG tablet tablet  Commonly known as:  VIIBRYD   40 mg, Oral, Every Morning             Discharge Diet:   Diet Instructions     Advance Diet As Tolerated      Resume regular diet.          Activity at Discharge:   Activity Instructions     Activity as Tolerated      Progress ambulation as tolerated.          Follow-up Appointments  No future appointments.  Additional Instructions for the Follow-ups that You Need to Schedule     Discharge Follow-up with Specified Provider: Dr. Khan office.  Appointment was made at the time of the surgery scheduling.  Call office if you need to verify appointment time or date.  Office [phone].; 2 Weeks   As directed      To:  Dr. Khan office.  Appointment was made at the time of the surgery scheduling.  Call office if you need to verify appointment time or date.  Office [phone].    Follow Up:  2 Weeks         Referral to Home Health   As directed      Face to Face Visit Date:  2/11/2020    Follow-up provider for Plan of Care?:  I will be treating the patient on an ongoing basis.  Please send me the Plan of Care for signature.    Follow-up provider:  JONATHAN  JANN [[id number]]    Reason/Clinical Findings:  S/p ADALBERTO    Describe mobility limitations that make leaving home difficult:  taxing effort    Nursing/Therapeutic Services Requested:  Physical Therapy    PT orders:  Total joint pathway    Frequency:  1 Week 1               Test Results Pending at Discharge  none     Jann Khan MD  02/11/20,  7:42 AM

## 2020-02-11 NOTE — PROGRESS NOTES
Case Management Discharge Note      Final Note: Home with VNA     Provided post acute provider list?: Yes  Post Acute Provider List: Nursing Home, Home Health  Post Acute Provider Quality & Resource List: Yes  Delivered To: Patient  Method of Delivery: In person    Destination      No service has been selected for the patient.      Durable Medical Equipment      No service has been selected for the patient.      Dialysis/Infusion      No service has been selected for the patient.      Home Medical Care - Selection Complete      Service Provider Request Status Selected Services Address Phone Number Fax Number    [facility] Selected Home Health Services [address] [place] [address] [phone]      Therapy      No service has been selected for the patient.      Community Resources      No service has been selected for the patient.        Transportation Services  Private: Car    Final Discharge Disposition Code: 06 - home with home health care

## 2020-02-11 NOTE — PROGRESS NOTES
Procedure(s):  TOTAL HIP ARTHROPLASTY ANTERIOR WITH HANA TABLE     LOS: 1 day     Subjective :   Complains of pain controlled with meds.    Objective :    Vital signs in last 24 hours:  Vitals:    02/10/20 1920 02/10/20 2334 02/11/20 0324 02/11/20 0731   BP: 114/61 129/75 107/47 125/63   BP Location: Left arm Left arm Left arm Left arm   Patient Position: Lying Lying Lying Lying   Pulse: 93 104 104 108   Resp: 16 16 16 16   Temp: 97.7 °F (36.5 °C) 97.1 °F (36.2 °C) 97.6 °F (36.4 °C) 98.2 °F (36.8 °C)   TempSrc: Oral Oral Oral Oral   SpO2: 92% 96% 95% 93%   Weight:       Height:           PHYSICAL EXAM:  Patient is calm, in no acute distress, awake and oriented x 3.  Dressing is clean, dry and intact.  No signs of infection.  Swelling is appropriate in amount.  Ecchymosis is appropriate in amount.  Homans test is negative.  Patient is neurovascularly intact distally.    LABS:  Results from last 7 days   Lab Units 02/11/20  0352  02/04/20  1411   WBC 10*3/mm3  --   --  11.22*   HEMOGLOBIN g/dL 10.0*   < > 12.0   HEMATOCRIT % 31.0*   < > 37.0   PLATELETS 10*3/mm3  --   --  357    < > = values in this interval not displayed.     Results from last 7 days   Lab Units 02/11/20  0352   SODIUM mmol/L 136   POTASSIUM mmol/L 4.7   CHLORIDE mmol/L 102   CO2 mmol/L 24.4   BUN mg/dL 16   CREATININE mg/dL 1.01*   GLUCOSE mg/dL 147*   CALCIUM mg/dL 8.7             ASSESSMENT:  Status post Procedure(s):  TOTAL HIP ARTHROPLASTY ANTERIOR WITH HANA TABLE      Plan:  Continue Physical Therapy, increase mobility and range of motion as tolerated.  Continue SCDs, Continue DVT prophylaxis.  Aspirin 81 mg BID after discharge.  Dispo planning for home today.    Jann Khan MD    Date: 2/11/2020  Time: 7:38 AM

## 2020-02-11 NOTE — PLAN OF CARE
Problem: Patient Care Overview  Goal: Plan of Care Review  Outcome: Ongoing (interventions implemented as appropriate)  Flowsheets (Taken 2/10/2020 9656)  Progress: improving  Plan of Care Reviewed With: patient  Outcome Summary: Pt recovered to baseline from anesthesia, alert and fully awake. Pt ambulating with walker and assist x1. VSS. Voiding fx is intact. Dressing cdi. Pain is controlled with po meds. Pt edcuated on fall prevention and pulmonary toilet. Pt anticipates d/c home possibly malick when ready.

## 2020-02-11 NOTE — DISCHARGE PLACEMENT REQUEST
"Ghassan James (69 y.o. Female)     Date of Birth Social Security Number Address Home Phone MRN    1950  [facility] [place] [id number] [phone] [id number]    Adventism Marital Status          Synagogue        Admission Date Admission Type Admitting Provider Attending Provider Department, Room/Bed    2/10/20 Elective Jann Khan MD Rhoads, David, MD [address], P895/1    Discharge Date Discharge Disposition Discharge Destination         [place] [facility] [place]ending Provider:  Jann Khan MD    Allergies:  Iodine, Shellfish-derived Products    Isolation:  None   Infection:  None   Code Status:  CPR    Ht:  162.6 cm (64\")   Wt:  90.6 kg (199 lb 11.8 oz)    Admission Cmt:  None   Principal Problem:  Osteoarthritis of right hip [M16.11]                 Active Insurance as of 2/10/2020     Primary Coverage     Payor Plan Insurance Group Employer/Plan Group    MEDICARE MEDICARE A & B      Payor Plan Address Payor Plan Phone Number Payor Plan Fax Number Effective Dates    [address] [phone]  11/1/2013 - None Entered    [facility] [id number]       Subscriber Name Subscriber Birth Date Member ID       GHASSAN JAMES 1950 [id number]           Secondary Coverage     Payor Plan Insurance Group Employer/Plan Group    HUMANA HUMANA MC SUP              [id number]     Payor Plan Address Payor Plan Phone Number Payor Plan Fax Number Effective Dates    [address] [address]   8/1/2018 - None Entered    [facility] [id number]       Subscriber Name Subscriber Birth Date Member ID       GHASSAN JAMES 1950 [id number]                 Emergency Contacts      (Rel.) Home Phone Work Phone Mobile Phone    Ramírez James (Spouse) [phone] -- [phone]              "

## 2020-02-11 NOTE — THERAPY DISCHARGE NOTE
"Patient Name: Ellen Adamson  : 1950    MRN: [id number]                              Today's Date: 2020       Admit Date: 2/10/2020    Visit Dx:     ICD-10-CM ICD-9-CM   1. 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)     \"IN MY NOSE\"   • CKD (chronic kidney disease) 2019    STAGE 3   • COPD (chronic obstructive pulmonary disease) (CMS/HCC)    • GERD (gastroesophageal reflux disease)    • Lumbar stenosis    • Right hip pain    • Sjogren's syndrome (CMS/HCC)      Past Surgical History:   Procedure Laterality Date   • ANKLE SURGERY      RIGHT   • APPENDECTOMY     • CHOLECYSTECTOMY     • COLONOSCOPY     • ELBOW PROCEDURE Left    • ENDOSCOPY     • HYSTERECTOMY     • TOTAL HIP ARTHROPLASTY Right 2/10/2020    Procedure: TOTAL HIP ARTHROPLASTY ANTERIOR WITH HANA TABLE;  Surgeon: Jann Khan MD;  Location: [facility];  Service: Orthopedics;  Laterality: Right;     General Information     Row Name 20 1223          PT Evaluation Time/Intention    Document Type  therapy note (daily note)  -AJAY     Mode of Treatment  group therapy;physical therapy  -AJAY       User Key  (r) = Recorded By, (t) = Taken By, (c) = Cosigned By    Initials Name Provider Type    Concepcion Goss, PT Physical Therapist        Mobility     Row Name 20 1223          Bed Mobility Assessment/Treatment    Comment (Bed Mobility)  UIC  -AJAY     Row Name 20 1223          Sit-Stand Transfer    Sit-Stand [place] (Transfers)  supervision  -AJAY     Assistive Device (Sit-Stand Transfers)  walker, front-wheeled  -AJAY     Row Name 20 1223          Gait/Stairs Assessment/Training    [place] Level (Gait)  supervision  -AJAY     Assistive Device (Gait)  walker, front-wheeled  -AJAY     " Distance in Feet (Gait)  100  -KH     Deviations/Abnormal Patterns (Gait)  antalgic;gait speed decreased  -     [place] Level (Stairs)  contact guard  -     Handrail Location (Stairs)  right side (ascending)  -     Number of Steps (Stairs)  4  -KH     Ascending Technique (Stairs)  step-to-step  -KH     Descending Technique (Stairs)  step-to-step  -KH       User Key  (r) = Recorded By, (t) = Taken By, (c) = Cosigned By    Initials Name Provider Type    Concepcion Goss, ELVIRA Physical Therapist        Obj/Interventions     Row Name 02/11/20 1224          Therapeutic Exercise    Comment (Therapeutic Exercise)  R ADALBERTO protocol x 15 reps  -       User Key  (r) = Recorded By, (t) = Taken By, (c) = Cosigned By    Initials Name Provider Type    Concepcion Goss, ELVIRA Physical Therapist        Goals/Plan    No documentation.       Clinical Impression     Row Name 02/11/20 1226          Pain Assessment    Additional Documentation  Pain Scale: Numbers Pre/Post-Treatment (Group)  -KH     Row Name 02/11/20 1226          Pain Scale: Numbers Pre/Post-Treatment    Pain Scale: Numbers, Pretreatment  7/10  -KH     Pain Scale: Numbers, Post-Treatment  7/10  -KH     Pain Location - Side  Right  -     Pain Location  hip  -     Pain Intervention(s)  Repositioned;Cold applied;Ambulation/increased activity  -[facility] Name 02/11/20 1226          Plan of Care Review    Plan of Care Reviewed With  patient  -KH     Row Name 02/11/20 1226          Positioning and Restraints    Pre-Treatment Position  sitting in chair/recliner  -     Post Treatment Position  chair  -     In Chair  encouraged to call for assist;call light within reach;with family/caregiver;exit alarm on;notified nsg  -       User Key  (r) = Recorded By, (t) = Taken By, (c) = Cosigned By    Initials Name Provider Type    Concepcion Goss, ELVIRA Physical Therapist        Outcome Measures     Row Name 02/11/20 1226          How much  help from another person do you currently need...    Turning from your back to your side while in flat bed without using bedrails?  4  -KH     Moving from lying on back to sitting on the side of a flat bed without bedrails?  4  -KH     Moving to and from a bed to a chair (including a wheelchair)?  4  -KH     Standing up from a chair using your arms (e.g., wheelchair, bedside chair)?  4  -KH     Climbing 3-5 steps with a railing?  3  -KH     To walk in hospital room?  4  -KH     AM-PAC 6 Clicks Score (PT)  23  -     Row Name 02/11/20 1226          Functional Assessment    Outcome Measure Options  AM-PAC 6 Clicks Basic Mobility (PT)  -       User Key  (r) = Recorded By, (t) = Taken By, (c) = Cosigned By    Initials Name Provider Type    Concepcion Goss PT Physical Therapist          PT Recommendation and Plan  Planned Therapy Interventions (PT Eval): balance training, bed mobility training, gait training, home exercise program, ROM (range of motion), stretching, strengthening, patient/family education, stair training  Outcome Summary/Treatment Plan (PT)  Anticipated Discharge Disposition (PT): home with home health  Plan of Care Reviewed With: patient  Outcome Summary: Pt is progressing well with HEP, ROM and gait training. Pt is safe to d/c home when medically stable. PT will sign off.     Time Calculation:   PT Charges     Row Name 02/11/20 1227             Time Calculation    Start Time  0934  -      Stop Time  1010  -      Time Calculation (min)  36 min  -      PT Received On  02/11/20  -         Time Calculation- PT    Total Timed Code Minutes- PT  20 minute(s)  -        User Key  (r) = Recorded By, (t) = Taken By, (c) = Cosigned By    Initials Name Provider Type    Concepcion Goss, PT Physical Therapist        Therapy Charges for Today     Code Description Service Date Service Provider Modifiers Qty    42394168354  PT EVAL MOD COMPLEXITY 2 2/10/2020 Concepcion [place]ille,  PT GP 1    16909605089 HC PT THER PROC EA 15 MIN 2/10/2020 Concepcion [place], PT GP 1    07675291708 HC PT THER PROC EA 15 MIN 2/11/2020 Concepcion Neville, PT GP 1    29288349293 HC PT THER PROC GROUP 2/11/2020 Concepcion Neville, PT GP 1          PT G-Codes  Outcome Measure Options: AM-PAC 6 Clicks Basic Mobility (PT)  AM-PAC 6 Clicks Score (PT): 23    PT Discharge Summary  Anticipated Discharge Disposition (PT): home with home health  Discharge Destination: Home with home health    Concepcion Neville, PT  2/11/2020

## 2020-02-11 NOTE — PLAN OF CARE
Problem: Patient Care Overview  Goal: Plan of Care Review  Outcome: Ongoing (interventions implemented as appropriate)  Flowsheets (Taken 2/11/2020 1123)  Progress: improving  Plan of Care Reviewed With: patient  Note:   VSS, tachycardic at times. Dressing c/d/I. Pain controlled with PO pain med. Up to chair and worked with PT. Ambulates with assist and walker. Voiding per BRP. Discussed o2 monitoring r/t COPD. Discharging home with HH.

## 2020-02-17 ENCOUNTER — HOSPITAL ENCOUNTER (INPATIENT)
Facility: HOSPITAL | Age: 70
LOS: 3 days | Discharge: HOME HEALTH | End: 2020-02-20
Attending: EMERGENCY MEDICINE | Admitting: INTERNAL MEDICINE

## 2020-02-17 ENCOUNTER — APPOINTMENT (OUTPATIENT)
Dept: CARDIOLOGY | Facility: HOSPITAL | Age: 70
End: 2020-02-17

## 2020-02-17 ENCOUNTER — APPOINTMENT (OUTPATIENT)
Dept: CT IMAGING | Facility: HOSPITAL | Age: 70
End: 2020-02-17

## 2020-02-17 DIAGNOSIS — J18.9 PNEUMONIA OF BOTH LUNGS DUE TO INFECTIOUS ORGANISM, UNSPECIFIED PART OF LUNG: Primary | ICD-10-CM

## 2020-02-17 DIAGNOSIS — M16.11 PRIMARY OSTEOARTHRITIS OF RIGHT HIP: ICD-10-CM

## 2020-02-17 DIAGNOSIS — R06.00 DYSPNEA, UNSPECIFIED TYPE: ICD-10-CM

## 2020-02-17 DIAGNOSIS — D64.9 ANEMIA, UNSPECIFIED TYPE: ICD-10-CM

## 2020-02-17 PROBLEM — J45.909 ASTHMA: Status: ACTIVE | Noted: 2020-02-17

## 2020-02-17 PROBLEM — K21.9 GERD WITHOUT ESOPHAGITIS: Status: ACTIVE | Noted: 2020-02-17

## 2020-02-17 PROBLEM — N18.30 CKD (CHRONIC KIDNEY DISEASE) STAGE 3, GFR 30-59 ML/MIN (HCC): Status: ACTIVE | Noted: 2020-02-17

## 2020-02-17 PROBLEM — D72.829 LEUKOCYTOSIS: Status: ACTIVE | Noted: 2020-02-17

## 2020-02-17 PROBLEM — J44.9 COPD WITH HYPOXIA (HCC): Status: ACTIVE | Noted: 2020-02-17

## 2020-02-17 PROBLEM — R09.02 COPD WITH HYPOXIA: Status: ACTIVE | Noted: 2020-02-17

## 2020-02-17 LAB
ALBUMIN SERPL-MCNC: 2.6 G/DL (ref 3.5–5.2)
ALBUMIN/GLOB SERPL: 0.8 G/DL
ALP SERPL-CCNC: 126 U/L (ref 39–117)
ALT SERPL W P-5'-P-CCNC: 13 U/L (ref 1–33)
ANION GAP SERPL CALCULATED.3IONS-SCNC: 12.4 MMOL/L (ref 5–15)
AST SERPL-CCNC: 23 U/L (ref 1–32)
B PARAPERT DNA SPEC QL NAA+PROBE: NOT DETECTED
B PERT DNA SPEC QL NAA+PROBE: NOT DETECTED
BASOPHILS # BLD AUTO: 0.07 10*3/MM3 (ref 0–0.2)
BASOPHILS NFR BLD AUTO: 0.4 % (ref 0–1.5)
BH CV LOWER VASCULAR LEFT COMMON FEMORAL AUGMENT: NORMAL
BH CV LOWER VASCULAR LEFT COMMON FEMORAL COMPETENT: NORMAL
BH CV LOWER VASCULAR LEFT COMMON FEMORAL COMPRESS: NORMAL
BH CV LOWER VASCULAR LEFT COMMON FEMORAL PHASIC: NORMAL
BH CV LOWER VASCULAR LEFT COMMON FEMORAL SPONT: NORMAL
BH CV LOWER VASCULAR LEFT DISTAL FEMORAL COMPRESS: NORMAL
BH CV LOWER VASCULAR LEFT GASTRONEMIUS COMPRESS: NORMAL
BH CV LOWER VASCULAR LEFT GREATER SAPH AK COMPRESS: NORMAL
BH CV LOWER VASCULAR LEFT GREATER SAPH BK COMPRESS: NORMAL
BH CV LOWER VASCULAR LEFT MID FEMORAL AUGMENT: NORMAL
BH CV LOWER VASCULAR LEFT MID FEMORAL COMPETENT: NORMAL
BH CV LOWER VASCULAR LEFT MID FEMORAL COMPRESS: NORMAL
BH CV LOWER VASCULAR LEFT MID FEMORAL PHASIC: NORMAL
BH CV LOWER VASCULAR LEFT MID FEMORAL SPONT: NORMAL
BH CV LOWER VASCULAR LEFT PERONEAL COMPRESS: NORMAL
BH CV LOWER VASCULAR LEFT POPLITEAL AUGMENT: NORMAL
BH CV LOWER VASCULAR LEFT POPLITEAL COMPETENT: NORMAL
BH CV LOWER VASCULAR LEFT POPLITEAL COMPRESS: NORMAL
BH CV LOWER VASCULAR LEFT POPLITEAL PHASIC: NORMAL
BH CV LOWER VASCULAR LEFT POPLITEAL SPONT: NORMAL
BH CV LOWER VASCULAR LEFT POSTERIOR TIBIAL COMPRESS: NORMAL
BH CV LOWER VASCULAR LEFT PROFUNDA FEMORAL COMPRESS: NORMAL
BH CV LOWER VASCULAR LEFT PROXIMAL FEMORAL COMPRESS: NORMAL
BH CV LOWER VASCULAR LEFT SAPHENOFEMORAL JUNCTION COMPRESS: NORMAL
BH CV LOWER VASCULAR RIGHT COMMON FEMORAL AUGMENT: NORMAL
BH CV LOWER VASCULAR RIGHT COMMON FEMORAL COMPETENT: NORMAL
BH CV LOWER VASCULAR RIGHT COMMON FEMORAL COMPRESS: NORMAL
BH CV LOWER VASCULAR RIGHT COMMON FEMORAL PHASIC: NORMAL
BH CV LOWER VASCULAR RIGHT COMMON FEMORAL SPONT: NORMAL
BH CV LOWER VASCULAR RIGHT DISTAL FEMORAL COMPRESS: NORMAL
BH CV LOWER VASCULAR RIGHT GASTRONEMIUS COMPRESS: NORMAL
BH CV LOWER VASCULAR RIGHT GREATER SAPH AK COMPRESS: NORMAL
BH CV LOWER VASCULAR RIGHT GREATER SAPH BK COMPRESS: NORMAL
BH CV LOWER VASCULAR RIGHT MID FEMORAL AUGMENT: NORMAL
BH CV LOWER VASCULAR RIGHT MID FEMORAL COMPETENT: NORMAL
BH CV LOWER VASCULAR RIGHT MID FEMORAL COMPRESS: NORMAL
BH CV LOWER VASCULAR RIGHT MID FEMORAL PHASIC: NORMAL
BH CV LOWER VASCULAR RIGHT MID FEMORAL SPONT: NORMAL
BH CV LOWER VASCULAR RIGHT PERONEAL COMPRESS: NORMAL
BH CV LOWER VASCULAR RIGHT POPLITEAL AUGMENT: NORMAL
BH CV LOWER VASCULAR RIGHT POPLITEAL COMPETENT: NORMAL
BH CV LOWER VASCULAR RIGHT POPLITEAL COMPRESS: NORMAL
BH CV LOWER VASCULAR RIGHT POPLITEAL PHASIC: NORMAL
BH CV LOWER VASCULAR RIGHT POPLITEAL SPONT: NORMAL
BH CV LOWER VASCULAR RIGHT POSTERIOR TIBIAL COMPRESS: NORMAL
BH CV LOWER VASCULAR RIGHT PROFUNDA FEMORAL COMPRESS: NORMAL
BH CV LOWER VASCULAR RIGHT PROXIMAL FEMORAL COMPRESS: NORMAL
BH CV LOWER VASCULAR RIGHT SAPHENOFEMORAL JUNCTION COMPRESS: NORMAL
BILIRUB SERPL-MCNC: 0.3 MG/DL (ref 0.2–1.2)
BUN BLD-MCNC: 10 MG/DL (ref 8–23)
BUN/CREAT SERPL: 11.8 (ref 7–25)
C PNEUM DNA NPH QL NAA+NON-PROBE: NOT DETECTED
CALCIUM SPEC-SCNC: 8.6 MG/DL (ref 8.6–10.5)
CHLORIDE SERPL-SCNC: 101 MMOL/L (ref 98–107)
CO2 SERPL-SCNC: 25.6 MMOL/L (ref 22–29)
CREAT BLD-MCNC: 0.85 MG/DL (ref 0.57–1)
D-LACTATE SERPL-SCNC: 0.8 MMOL/L (ref 0.5–2)
DEPRECATED RDW RBC AUTO: 42.7 FL (ref 37–54)
EOSINOPHIL # BLD AUTO: 0.35 10*3/MM3 (ref 0–0.4)
EOSINOPHIL NFR BLD AUTO: 2.1 % (ref 0.3–6.2)
ERYTHROCYTE [DISTWIDTH] IN BLOOD BY AUTOMATED COUNT: 13.9 % (ref 12.3–15.4)
FLUAV H1 2009 PAND RNA NPH QL NAA+PROBE: NOT DETECTED
FLUAV H1 HA GENE NPH QL NAA+PROBE: NOT DETECTED
FLUAV H3 RNA NPH QL NAA+PROBE: NOT DETECTED
FLUAV SUBTYP SPEC NAA+PROBE: NOT DETECTED
FLUBV RNA ISLT QL NAA+PROBE: NOT DETECTED
GFR SERPL CREATININE-BSD FRML MDRD: 66 ML/MIN/1.73
GLOBULIN UR ELPH-MCNC: 3.4 GM/DL
GLUCOSE BLD-MCNC: 118 MG/DL (ref 65–99)
HADV DNA SPEC NAA+PROBE: NOT DETECTED
HCOV 229E RNA SPEC QL NAA+PROBE: NOT DETECTED
HCOV HKU1 RNA SPEC QL NAA+PROBE: NOT DETECTED
HCOV NL63 RNA SPEC QL NAA+PROBE: NOT DETECTED
HCOV OC43 RNA SPEC QL NAA+PROBE: NOT DETECTED
HCT VFR BLD AUTO: 25 % (ref 34–46.6)
HCT VFR BLD AUTO: 26.3 % (ref 34–46.6)
HGB BLD-MCNC: 8.2 G/DL (ref 12–15.9)
HGB BLD-MCNC: 8.6 G/DL (ref 12–15.9)
HMPV RNA NPH QL NAA+NON-PROBE: NOT DETECTED
HPIV1 RNA SPEC QL NAA+PROBE: NOT DETECTED
HPIV2 RNA SPEC QL NAA+PROBE: NOT DETECTED
HPIV3 RNA NPH QL NAA+PROBE: NOT DETECTED
HPIV4 P GENE NPH QL NAA+PROBE: NOT DETECTED
IMM GRANULOCYTES # BLD AUTO: 0.42 10*3/MM3 (ref 0–0.05)
IMM GRANULOCYTES NFR BLD AUTO: 2.5 % (ref 0–0.5)
INR PPP: 1.09 (ref 0.9–1.1)
LYMPHOCYTES # BLD AUTO: 1.36 10*3/MM3 (ref 0.7–3.1)
LYMPHOCYTES NFR BLD AUTO: 8.1 % (ref 19.6–45.3)
M PNEUMO IGG SER IA-ACNC: NOT DETECTED
MCH RBC QN AUTO: 28.1 PG (ref 26.6–33)
MCHC RBC AUTO-ENTMCNC: 32.7 G/DL (ref 31.5–35.7)
MCV RBC AUTO: 85.9 FL (ref 79–97)
MONOCYTES # BLD AUTO: 1.3 10*3/MM3 (ref 0.1–0.9)
MONOCYTES NFR BLD AUTO: 7.8 % (ref 5–12)
NEUTROPHILS # BLD AUTO: 13.25 10*3/MM3 (ref 1.7–7)
NEUTROPHILS NFR BLD AUTO: 79.1 % (ref 42.7–76)
NRBC BLD AUTO-RTO: 0 /100 WBC (ref 0–0.2)
NT-PROBNP SERPL-MCNC: 1366 PG/ML (ref 5–900)
PLATELET # BLD AUTO: 414 10*3/MM3 (ref 140–450)
PMV BLD AUTO: 9.1 FL (ref 6–12)
POTASSIUM BLD-SCNC: 3.9 MMOL/L (ref 3.5–5.2)
PROT SERPL-MCNC: 6 G/DL (ref 6–8.5)
PROTHROMBIN TIME: 13.8 SECONDS (ref 11.7–14.2)
RBC # BLD AUTO: 3.06 10*6/MM3 (ref 3.77–5.28)
RHINOVIRUS RNA SPEC NAA+PROBE: NOT DETECTED
RSV RNA NPH QL NAA+NON-PROBE: NOT DETECTED
SODIUM BLD-SCNC: 139 MMOL/L (ref 136–145)
TROPONIN T SERPL-MCNC: <0.01 NG/ML (ref 0–0.03)
WBC NRBC COR # BLD: 16.75 10*3/MM3 (ref 3.4–10.8)

## 2020-02-17 PROCEDURE — 84484 ASSAY OF TROPONIN QUANT: CPT | Performed by: NURSE PRACTITIONER

## 2020-02-17 PROCEDURE — 93005 ELECTROCARDIOGRAM TRACING: CPT | Performed by: NURSE PRACTITIONER

## 2020-02-17 PROCEDURE — 99284 EMERGENCY DEPT VISIT MOD MDM: CPT

## 2020-02-17 PROCEDURE — 94799 UNLISTED PULMONARY SVC/PX: CPT

## 2020-02-17 PROCEDURE — 0100U HC BIOFIRE FILMARRAY RESP PANEL 2: CPT | Performed by: HOSPITALIST

## 2020-02-17 PROCEDURE — 25010000002 AZITHROMYCIN PER 500 MG: Performed by: NURSE PRACTITIONER

## 2020-02-17 PROCEDURE — 87040 BLOOD CULTURE FOR BACTERIA: CPT | Performed by: NURSE PRACTITIONER

## 2020-02-17 PROCEDURE — 85014 HEMATOCRIT: CPT | Performed by: HOSPITALIST

## 2020-02-17 PROCEDURE — 85018 HEMOGLOBIN: CPT | Performed by: HOSPITALIST

## 2020-02-17 PROCEDURE — 83605 ASSAY OF LACTIC ACID: CPT | Performed by: NURSE PRACTITIONER

## 2020-02-17 PROCEDURE — 93010 ELECTROCARDIOGRAM REPORT: CPT | Performed by: INTERNAL MEDICINE

## 2020-02-17 PROCEDURE — 85025 COMPLETE CBC W/AUTO DIFF WBC: CPT | Performed by: NURSE PRACTITIONER

## 2020-02-17 PROCEDURE — 85610 PROTHROMBIN TIME: CPT | Performed by: NURSE PRACTITIONER

## 2020-02-17 PROCEDURE — 94640 AIRWAY INHALATION TREATMENT: CPT

## 2020-02-17 PROCEDURE — 93970 EXTREMITY STUDY: CPT

## 2020-02-17 PROCEDURE — 25010000002 CEFTRIAXONE PER 250 MG: Performed by: NURSE PRACTITIONER

## 2020-02-17 PROCEDURE — 80053 COMPREHEN METABOLIC PANEL: CPT | Performed by: NURSE PRACTITIONER

## 2020-02-17 PROCEDURE — 0 IOPAMIDOL PER 1 ML: Performed by: EMERGENCY MEDICINE

## 2020-02-17 PROCEDURE — 36415 COLL VENOUS BLD VENIPUNCTURE: CPT | Performed by: HOSPITALIST

## 2020-02-17 PROCEDURE — 25010000002 ENOXAPARIN PER 10 MG: Performed by: NURSE PRACTITIONER

## 2020-02-17 PROCEDURE — 71275 CT ANGIOGRAPHY CHEST: CPT

## 2020-02-17 PROCEDURE — 25010000002 ONDANSETRON PER 1 MG: Performed by: NURSE PRACTITIONER

## 2020-02-17 PROCEDURE — 83880 ASSAY OF NATRIURETIC PEPTIDE: CPT | Performed by: NURSE PRACTITIONER

## 2020-02-17 RX ORDER — SODIUM CHLORIDE 0.9 % (FLUSH) 0.9 %
10 SYRINGE (ML) INJECTION AS NEEDED
Status: DISCONTINUED | OUTPATIENT
Start: 2020-02-17 | End: 2020-02-20 | Stop reason: HOSPADM

## 2020-02-17 RX ORDER — BUPROPION HYDROCHLORIDE 150 MG/1
150 TABLET, EXTENDED RELEASE ORAL 2 TIMES DAILY
Status: DISCONTINUED | OUTPATIENT
Start: 2020-02-17 | End: 2020-02-20 | Stop reason: HOSPADM

## 2020-02-17 RX ORDER — HYDROCODONE BITARTRATE AND ACETAMINOPHEN 10; 325 MG/1; MG/1
1 TABLET ORAL EVERY 4 HOURS PRN
Status: DISCONTINUED | OUTPATIENT
Start: 2020-02-17 | End: 2020-02-18

## 2020-02-17 RX ORDER — DULOXETIN HYDROCHLORIDE 60 MG/1
60 CAPSULE, DELAYED RELEASE ORAL EVERY MORNING
Status: DISCONTINUED | OUTPATIENT
Start: 2020-02-18 | End: 2020-02-20 | Stop reason: HOSPADM

## 2020-02-17 RX ORDER — VILAZODONE HYDROCHLORIDE 40 MG/1
40 TABLET ORAL EVERY MORNING
Status: DISCONTINUED | OUTPATIENT
Start: 2020-02-18 | End: 2020-02-20 | Stop reason: HOSPADM

## 2020-02-17 RX ORDER — GUAIFENESIN 600 MG/1
1200 TABLET, EXTENDED RELEASE ORAL EVERY 12 HOURS SCHEDULED
Status: DISCONTINUED | OUTPATIENT
Start: 2020-02-17 | End: 2020-02-20 | Stop reason: HOSPADM

## 2020-02-17 RX ORDER — SODIUM CHLORIDE 0.9 % (FLUSH) 0.9 %
10 SYRINGE (ML) INJECTION EVERY 12 HOURS SCHEDULED
Status: DISCONTINUED | OUTPATIENT
Start: 2020-02-17 | End: 2020-02-20 | Stop reason: HOSPADM

## 2020-02-17 RX ORDER — ONDANSETRON 4 MG/1
4 TABLET, FILM COATED ORAL EVERY 6 HOURS PRN
Status: DISCONTINUED | OUTPATIENT
Start: 2020-02-17 | End: 2020-02-20 | Stop reason: HOSPADM

## 2020-02-17 RX ORDER — CEFTRIAXONE SODIUM 1 G/50ML
1 INJECTION, SOLUTION INTRAVENOUS EVERY 24 HOURS
Status: DISCONTINUED | OUTPATIENT
Start: 2020-02-18 | End: 2020-02-20 | Stop reason: HOSPADM

## 2020-02-17 RX ORDER — ALPRAZOLAM 1 MG/1
1 TABLET ORAL 4 TIMES DAILY PRN
Status: DISCONTINUED | OUTPATIENT
Start: 2020-02-17 | End: 2020-02-18

## 2020-02-17 RX ORDER — ACETAMINOPHEN 160 MG/5ML
650 SOLUTION ORAL EVERY 4 HOURS PRN
Status: DISCONTINUED | OUTPATIENT
Start: 2020-02-17 | End: 2020-02-20 | Stop reason: HOSPADM

## 2020-02-17 RX ORDER — ONDANSETRON 2 MG/ML
4 INJECTION INTRAMUSCULAR; INTRAVENOUS EVERY 6 HOURS PRN
Status: DISCONTINUED | OUTPATIENT
Start: 2020-02-17 | End: 2020-02-20 | Stop reason: HOSPADM

## 2020-02-17 RX ORDER — CEFTRIAXONE SODIUM 1 G/50ML
1 INJECTION, SOLUTION INTRAVENOUS ONCE
Status: COMPLETED | OUTPATIENT
Start: 2020-02-17 | End: 2020-02-17

## 2020-02-17 RX ORDER — ACETAMINOPHEN 325 MG/1
650 TABLET ORAL EVERY 4 HOURS PRN
Status: DISCONTINUED | OUTPATIENT
Start: 2020-02-17 | End: 2020-02-20 | Stop reason: HOSPADM

## 2020-02-17 RX ORDER — NITROGLYCERIN 0.4 MG/1
0.4 TABLET SUBLINGUAL
Status: DISCONTINUED | OUTPATIENT
Start: 2020-02-17 | End: 2020-02-20 | Stop reason: HOSPADM

## 2020-02-17 RX ORDER — ACETAMINOPHEN 650 MG/1
650 SUPPOSITORY RECTAL EVERY 4 HOURS PRN
Status: DISCONTINUED | OUTPATIENT
Start: 2020-02-17 | End: 2020-02-20 | Stop reason: HOSPADM

## 2020-02-17 RX ORDER — SODIUM CHLORIDE 9 MG/ML
75 INJECTION, SOLUTION INTRAVENOUS CONTINUOUS
Status: DISCONTINUED | OUTPATIENT
Start: 2020-02-17 | End: 2020-02-18

## 2020-02-17 RX ORDER — ONDANSETRON 2 MG/ML
4 INJECTION INTRAMUSCULAR; INTRAVENOUS ONCE
Status: COMPLETED | OUTPATIENT
Start: 2020-02-17 | End: 2020-02-17

## 2020-02-17 RX ORDER — IPRATROPIUM BROMIDE AND ALBUTEROL SULFATE 2.5; .5 MG/3ML; MG/3ML
3 SOLUTION RESPIRATORY (INHALATION)
Status: DISCONTINUED | OUTPATIENT
Start: 2020-02-17 | End: 2020-02-20 | Stop reason: HOSPADM

## 2020-02-17 RX ADMIN — ONDANSETRON 4 MG: 2 INJECTION INTRAMUSCULAR; INTRAVENOUS at 10:41

## 2020-02-17 RX ADMIN — ALPRAZOLAM 1 MG: 0.25 TABLET ORAL at 18:35

## 2020-02-17 RX ADMIN — IPRATROPIUM BROMIDE AND ALBUTEROL SULFATE 3 ML: 2.5; .5 SOLUTION RESPIRATORY (INHALATION) at 21:06

## 2020-02-17 RX ADMIN — IOPAMIDOL 95 ML: 755 INJECTION, SOLUTION INTRAVENOUS at 10:59

## 2020-02-17 RX ADMIN — SODIUM CHLORIDE, PRESERVATIVE FREE 10 ML: 5 INJECTION INTRAVENOUS at 21:28

## 2020-02-17 RX ADMIN — HYDROCODONE BITARTRATE AND ACETAMINOPHEN 1 TABLET: 10; 325 TABLET ORAL at 18:36

## 2020-02-17 RX ADMIN — BUPROPION HYDROCHLORIDE 150 MG: 150 TABLET, EXTENDED RELEASE ORAL at 21:58

## 2020-02-17 RX ADMIN — SODIUM CHLORIDE 75 ML/HR: 9 INJECTION, SOLUTION INTRAVENOUS at 21:28

## 2020-02-17 RX ADMIN — SODIUM CHLORIDE, PRESERVATIVE FREE 10 ML: 5 INJECTION INTRAVENOUS at 21:58

## 2020-02-17 RX ADMIN — AZITHROMYCIN DIHYDRATE 500 MG: 500 INJECTION, POWDER, LYOPHILIZED, FOR SOLUTION INTRAVENOUS at 13:08

## 2020-02-17 RX ADMIN — ENOXAPARIN SODIUM 40 MG: 40 INJECTION SUBCUTANEOUS at 21:28

## 2020-02-17 RX ADMIN — GUAIFENESIN 1200 MG: 600 TABLET, EXTENDED RELEASE ORAL at 21:27

## 2020-02-17 RX ADMIN — CEFTRIAXONE SODIUM 1 G: 1 INJECTION, SOLUTION INTRAVENOUS at 12:36

## 2020-02-17 NOTE — ED NOTES
Nursing report ED to floor  Ellen Adamson  69 y.o.  female    HPI (triage note):   Chief Complaint   Patient presents with   • Shortness of Breath       Admitting doctor:   Dario Henry MD    Admitting diagnosis:   The primary encounter diagnosis was Multi focal pneumonia of both lungs due to infectious organism, unspecified part of lung. Diagnoses of Dyspnea, unspecified type and Anemia, unspecified type were also pertinent to this visit.    Code status:   Current Code Status     Date Active Code Status Order ID Comments User Context       Prior          Allergies:   Iodine and Shellfish-derived products    Weight:       02/17/20 0931   Weight: 97.8 kg (215 lb 9.6 oz)       Most recent vitals:   Vitals:    02/17/20 1145 02/17/20 1240 02/17/20 1330 02/17/20 1430   BP: 123/49 120/56 108/56 111/59   BP Location: Left arm Left arm Left arm Left arm   Patient Position: Lying Lying Lying Lying   Pulse: 101 108 105 98   Resp: 18 18 18 18   Temp:       TempSrc:       SpO2: 95% 96% 95% 95%   Weight:       Height:           Active LDAs/IV Access:   Lines, Drains & Airways    Active LDAs     Name:   Placement date:   Placement time:   Site:   Days:    Peripheral IV 02/17/20 0933 Right Antecubital   02/17/20 0933    Antecubital   less than 1                Labs (abnormal labs have a star):   Labs Reviewed   COMPREHENSIVE METABOLIC PANEL - Abnormal; Notable for the following components:       Result Value    Glucose 118 (*)     Albumin 2.60 (*)     Alkaline Phosphatase 126 (*)     All other components within normal limits    Narrative:     GFR Normal >60  Chronic Kidney Disease <60  Kidney Failure <15     BNP (IN-HOUSE) - Abnormal; Notable for the following components:    proBNP 1,366.0 (*)     All other components within normal limits    Narrative:     Among patients with dyspnea, NT-proBNP is highly sensitive for the detection of acute congestive heart failure. In addition NT-proBNP of <300 pg/ml effectively rules  out acute congestive heart failure with 99% negative predictive value.    Results may be falsely decreased if patient taking Biotin.     CBC WITH AUTO DIFFERENTIAL - Abnormal; Notable for the following components:    WBC 16.75 (*)     RBC 3.06 (*)     Hemoglobin 8.6 (*)     Hematocrit 26.3 (*)     Neutrophil % 79.1 (*)     Lymphocyte % 8.1 (*)     Immature Grans % 2.5 (*)     Neutrophils, Absolute 13.25 (*)     Monocytes, Absolute 1.30 (*)     Immature Grans, Absolute 0.42 (*)     All other components within normal limits   PROTIME-INR - Normal   TROPONIN (IN-HOUSE) - Normal    Narrative:     Troponin T Reference Range:  <= 0.03 ng/mL-   Negative for AMI  >0.03 ng/mL-     Abnormal for myocardial necrosis.  Clinicians would have to utilize clinical acumen, EKG, Troponin and serial changes to determine if it is an Acute Myocardial Infarction or myocardial injury due to an underlying chronic condition.       Results may be falsely decreased if patient taking Biotin.     LACTIC ACID, PLASMA - Normal   BLOOD CULTURE   BLOOD CULTURE   RESPIRATORY PANEL, PCR   CBC AND DIFFERENTIAL    Narrative:     The following orders were created for panel order CBC & Differential.  Procedure                               Abnormality         Status                     ---------                               -----------         ------                     CBC Auto Differential[[id number]]        Abnormal            Final result                 Please view results for these tests on the individual orders.       EKG:   ECG 12 Lead   Preliminary Result   HEART RATE= 100  bpm   RR Interval= 596  ms   HI Interval= 140  ms   P Horizontal Axis= -2  deg   P Front Axis= 73  deg   QRSD Interval= 75  ms   QT Interval= 325  ms   QRS Axis= 59  deg   T Wave Axis= 35  deg   - OTHERWISE NORMAL ECG -   Sinus tachycardia   Electronically Signed By:    Date and Time of Study: 2020-02-17 09:50:44          Meds given in ED:   Medications   sodium chloride 0.9 %  flush 10 mL (has no administration in time range)   cefTRIAXone (ROCEPHIN) IVPB 1 g (has no administration in time range)   azithromycin (ZITHROMAX) 500 mg 0.9% NaCl (Add-vantage) 250 mL (has no administration in time range)   ondansetron (ZOFRAN) injection 4 mg (4 mg Intravenous Given 20 1041)   iopamidol (ISOVUE-370) 76 % injection 100 mL (95 mL Intravenous Given by Other 20 1059)   cefTRIAXone (ROCEPHIN) IVPB 1 g (0 g Intravenous Stopped 20 1308)   azithromycin (ZITHROMAX) 500 mg 0.9% NaCl (Add-vantage) 250 mL (0 mg Intravenous Stopped 20 1446)       Imaging results:  Ct Angiogram Chest    Result Date: 2020  1. No convincing evidence of pulmonary artery thromboembolism. 2. CT findings suggestive of multifocal pneumonia. Multiple enlarged mediastinal and bilateral hilar lymph nodes are favored to be reactive. Follow-up CT chest is recommended in 8-12 weeks following treatment to ensure resolution.  These findings were discussed with Zuri Dwyer by telephone.  Radiation dose reduction techniques were utilized, including automated exposure control and exposure modulation based on body size.         Ambulatory status:   - up with assist    Social issues:   Social History     Socioeconomic History   • Marital status:      Spouse name: Not on file   • Number of children: Not on file   • Years of education: Not on file   • Highest education level: Not on file   Tobacco Use   • Smoking status: Former Smoker     Packs/day: 1.00     Years: 25.00     Pack years: 25.00     Types: Cigarettes     Last attempt to quit: 10/16/2019     Years since quittin.3   Substance and Sexual Activity   • Alcohol use: Not Currently   • Drug use: Never   • Sexual activity: Denise Cat RN  20 [phone]

## 2020-02-17 NOTE — ED NOTES
"Patient reports that on Monday 2/10 she had a hip replacement and started feeling SOA the next day. Patient reports \"I didn't see anyone sooner than today because I just brushed it off and I thought it was something that I could deal with\". Patient reports that she has a hx of asthma and did take a breathing treatment last night, but states it did not help her out.      Denise Gerber RN  02/17/20 1006    "

## 2020-02-17 NOTE — ED PROVIDER NOTES
MD ATTESTATION NOTE    The MAY and I have discussed this patient's history, physical exam, and treatment plan.  I have reviewed the documentation and personally had a face to face interaction with the patient. I affirm the AMY documentation and agree with their findings, treatment, and plan.  The attached note describes my personal findings.      Ellen Adamson is a 69 y.o. female who presents to the ED c/o constant, gradual, progressively worsening SOA that started 6 days ago. Pt also c/o cough. Pt denies lightheadedness, vomiting, diarrhea and fever. The pt has a R hip replacement on 2/10/20 by Dr. Khan (Orthopedics) and gradually developed worsening shortness of air and cough. Due to sx's, she came to the ED for further evaluation. Pt is not on supplemental O2 normally. Pt denies known hx of CHF.       On exam:  General: No acute distress.  Head: Normal cephalic, atraumatic  ENT: Extraocular motion intact, pupils equal and round reactive to light, moist mucous membranes  Neck: Supple, trachea midline  Cardiac, tachycardic rate and regular rhythm, no murmur  Lungs: O2 Sat is 89% on RA. The pt is requiring 2L NC to maintain an O2 Sat of 94%. Good air movement, crackles to the bottom half of the L lung  Abdomen: Soft, nontender, no rebound tenderness/guarding/rigidity  Extremities: Moves all extremities well, no peripheral edema  Skin: Warm, dry    Labs  Recent Results (from the past 24 hour(s))   Comprehensive Metabolic Panel    Collection Time: 02/17/20  9:33 AM   Result Value Ref Range    Glucose 118 (H) 65 - 99 mg/dL    BUN 10 8 - 23 mg/dL    Creatinine 0.85 0.57 - 1.00 mg/dL    Sodium 139 136 - 145 mmol/L    Potassium 3.9 3.5 - 5.2 mmol/L    Chloride 101 98 - 107 mmol/L    CO2 25.6 22.0 - 29.0 mmol/L    Calcium 8.6 8.6 - 10.5 mg/dL    Total Protein 6.0 6.0 - 8.5 g/dL    Albumin 2.60 (L) 3.50 - 5.20 g/dL    ALT (SGPT) 13 1 - 33 U/L    AST (SGOT) 23 1 - 32 U/L    Alkaline Phosphatase 126 (H) 39 - 117 U/L     Total Bilirubin 0.3 0.2 - 1.2 mg/dL    eGFR Non African Amer 66 >60 mL/min/1.73    Globulin 3.4 gm/dL    A/G Ratio 0.8 g/dL    BUN/Creatinine Ratio 11.8 7.0 - 25.0    Anion Gap 12.4 5.0 - 15.0 mmol/L   Protime-INR    Collection Time: 02/17/20  9:33 AM   Result Value Ref Range    Protime 13.8 11.7 - 14.2 Seconds    INR 1.09 0.90 - 1.10   BNP    Collection Time: 02/17/20  9:33 AM   Result Value Ref Range    proBNP 1,366.0 (H) 5.0 - 900.0 pg/mL   Troponin    Collection Time: 02/17/20  9:33 AM   Result Value Ref Range    Troponin T <0.010 0.000 - 0.030 ng/mL   CBC Auto Differential    Collection Time: 02/17/20  9:33 AM   Result Value Ref Range    WBC 16.75 (H) 3.40 - 10.80 10*3/mm3    RBC 3.06 (L) 3.77 - 5.28 10*6/mm3    Hemoglobin 8.6 (L) 12.0 - 15.9 g/dL    Hematocrit 26.3 (L) 34.0 - 46.6 %    MCV 85.9 79.0 - 97.0 fL    MCH 28.1 26.6 - 33.0 pg    MCHC 32.7 31.5 - 35.7 g/dL    RDW 13.9 12.3 - 15.4 %    RDW-SD 42.7 37.0 - 54.0 fl    MPV 9.1 6.0 - 12.0 fL    Platelets 414 140 - 450 10*3/mm3    Neutrophil % 79.1 (H) 42.7 - 76.0 %    Lymphocyte % 8.1 (L) 19.6 - 45.3 %    Monocyte % 7.8 5.0 - 12.0 %    Eosinophil % 2.1 0.3 - 6.2 %    Basophil % 0.4 0.0 - 1.5 %    Immature Grans % 2.5 (H) 0.0 - 0.5 %    Neutrophils, Absolute 13.25 (H) 1.70 - 7.00 10*3/mm3    Lymphocytes, Absolute 1.36 0.70 - 3.10 10*3/mm3    Monocytes, Absolute 1.30 (H) 0.10 - 0.90 10*3/mm3    Eosinophils, Absolute 0.35 0.00 - 0.40 10*3/mm3    Basophils, Absolute 0.07 0.00 - 0.20 10*3/mm3    Immature Grans, Absolute 0.42 (H) 0.00 - 0.05 10*3/mm3    nRBC 0.0 0.0 - 0.2 /100 WBC   Lactic Acid, Plasma    Collection Time: 02/17/20 12:28 PM   Result Value Ref Range    Lactate 0.8 0.5 - 2.0 mmol/L   Respiratory Panel, PCR - Swab, Nasopharynx    Collection Time: 02/17/20  2:07 PM   Result Value Ref Range    ADENOVIRUS, PCR Not Detected Not Detected    Coronavirus 229E Not Detected Not Detected    Coronavirus HKU1 Not Detected Not Detected    Coronavirus NL63 Not  Detected Not Detected    Coronavirus OC43 Not Detected Not Detected    Human Metapneumovirus Not Detected Not Detected    Human Rhinovirus/Enterovirus Not Detected Not Detected    Influenza B PCR Not Detected Not Detected    Parainfluenza Virus 1 Not Detected Not Detected    Parainfluenza Virus 2 Not Detected Not Detected    Parainfluenza Virus 3 Not Detected Not Detected    Parainfluenza Virus 4 Not Detected Not Detected    Bordetella pertussis pcr Not Detected Not Detected    Influenza A H1 2009 PCR Not Detected Not Detected    Chlamydophila pneumoniae PCR Not Detected Not Detected    Mycoplasma pneumo by PCR Not Detected Not Detected    Influenza A PCR Not Detected Not Detected    Influenza A H3 Not Detected Not Detected    Influenza A H1 Not Detected Not Detected    RSV, PCR Not Detected Not Detected    Bordetella parapertussis PCR Not Detected Not Detected       Radiology  Ct Angiogram Chest    Result Date: 2/17/2020  CT ANGIOGRAM CHEST WITH CONTRAST, PULMONARY EMBOLISM PROTOCOL  HISTORY: Shortness of breath. Status post hip arthroplasty.  TECHNIQUE: Spiral CT images were obtained from the lung apices to the diaphragmatic domes following administration of intravenous contrast in the angiographic phase. Coronal, sagittal and 3-D volume rendered reformats were then obtained.  COMPARISON: CT chest with contrast from 09/23/2015.  FINDINGS: The pulmonary arteries are well opacified with intravenous contrast. No convincing filling defect is identified to suggest pulmonary artery thromboembolism. The central airways are patent without endobronchial lesion. Dependent secretions are seen within the right main bronchus. There is dense area of consolidation seen within the left lower lobe. Additionally infiltrates are demonstrated bilaterally within all of the aerated lung fields. There is a small left pleural effusion present. No pericardial effusion. Enlarged mediastinal lymph nodes are present. Index AP window lymph  node measures up to 1 cm in short axis dimension. A subcarinal lymph node measures up to 1.3 cm. Enlarged bilateral hilar lymphadenopathy is also seen.  The visualized upper abdomen is unremarkable. No pathological axillary lymphadenopathy.      1. No convincing evidence of pulmonary artery thromboembolism. 2. CT findings suggestive of multifocal pneumonia. Multiple enlarged mediastinal and bilateral hilar lymph nodes are favored to be reactive. Follow-up CT chest is recommended in 8-12 weeks following treatment to ensure resolution.  These findings were discussed with Zuri Dwyer by telephone.  Radiation dose reduction techniques were utilized, including automated exposure control and exposure modulation based on body size.         Medical Decision Making:       Diagnosis  Final diagnoses:   Multi focal pneumonia of both lungs due to infectious organism, unspecified part of lung   Dyspnea, unspecified type   Anemia, unspecified type     --  Documentation assistance provided by kathrin Espinoza for Dr. Danilo Wright.  Information recorded by the scribe was done at my direction and has been verified and validated by me.         Alexis Domínguez  02/17/20 5931       Danilo Wright MD  02/17/20 3342

## 2020-02-17 NOTE — ED PROVIDER NOTES
EMERGENCY DEPARTMENT ENCOUNTER    CHIEF COMPLAINT  Chief Complaint: SOA  History given by:Patient   History limited by: Nothing   Time Seen: 0920  Room Number: 22/22  PMD: Grant Layne MD      HPI:  Pt is a 69 y.o. female who presents with constant SOA that began 6 days ago and has progressively worsened since. Patient denies chest pain and fever. She denies SOA being worse with lying supine or exertion. Pt reports having a R hip replacement 7 days ago with  (ortho). She states that she began to feel SOA when she went home 6 days ago and it has been worsening since. Pt reports a hx of asthma and states she originally thought that SOA was an asthma flare up but became concerned when it did not improve with normal medications. Pt states that current episode of SOA has lasted longer and been more severe then past asthma attacks. She denies being a smoker. She reports taking a baby aspirin daily but denies being otherwise anticoagulated.     Past Medical History of COPD, asthma and CKD.     Duration: 6 days   Timing: Constant   Location: Respiratory   Radiation: None  Quality: SOA  Intensity/Severity: Moderate  Progression: Worsening   Associated Symptoms: None   Aggravating Factors: None   Alleviating Factors: None  Previous Episodes: Pt has hx of asthma   Treatment before arrival: None    PAST MEDICAL HISTORY  Active Ambulatory Problems     Diagnosis Date Noted   • Osteoarthritis of right hip 02/10/2020     Resolved Ambulatory Problems     Diagnosis Date Noted   • No Resolved Ambulatory Problems     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)    • CKD (chronic kidney disease) 2019   • COPD (chronic obstructive pulmonary disease) (CMS/[facility])    • GERD (gastroesophageal reflux disease)    • Lumbar stenosis    • Right hip pain    • Sjogren's syndrome (CMS/[facility])        PAST SURGICAL HISTORY  Past Surgical  History:   Procedure Laterality Date   • ANKLE SURGERY      RIGHT   • APPENDECTOMY     • CHOLECYSTECTOMY     • COLONOSCOPY     • ELBOW PROCEDURE Left    • ENDOSCOPY     • HYSTERECTOMY     • TOTAL HIP ARTHROPLASTY Right 2/10/2020    Procedure: TOTAL HIP ARTHROPLASTY ANTERIOR WITH HANA TABLE;  Surgeon: Jann Khan MD;  Location: [facility] MAIN OR;  Service: Orthopedics;  Laterality: Right;       FAMILY HISTORY  Family History   Problem Relation Age of Onset   • No Known Problems Mother    • No Known Problems Father    • Malig Hyperthermia Neg Hx        SOCIAL HISTORY  Social History     Socioeconomic History   • Marital status:      Spouse name: Not on file   • Number of children: Not on file   • Years of education: Not on file   • Highest education level: Not on file   Tobacco Use   • Smoking status: Former Smoker     Packs/day: 1.00     Years: 25.00     Pack years: 25.00     Types: Cigarettes     Last attempt to quit: 10/16/2019     Years since quittin.3   Substance and Sexual Activity   • Alcohol use: Not Currently   • Drug use: Never   • Sexual activity: Defer         ALLERGIES  Iodine and Shellfish-derived products    REVIEW OF SYSTEMS  Review of Systems   Constitutional: Negative for chills and fever.   HENT: Negative for sore throat.    Respiratory: Positive for shortness of breath.    Cardiovascular: Negative for chest pain.   Gastrointestinal: Negative for nausea and vomiting.   Genitourinary: Negative for dysuria.   Musculoskeletal: Negative for back pain.   Skin: Negative for rash.   Neurological: Negative for dizziness.   Psychiatric/Behavioral: The patient is not nervous/anxious.        PHYSICAL EXAM  ED Triage Vitals   Temp Heart Rate Resp BP SpO2   20 0850 20 0850 20 0850 20 0931 20 0850   98.5 °F (36.9 °C) 116 18 127/96 92 %         Physical Exam   Constitutional: She is well-developed, well-nourished, and in no distress.   Speaking in full sentences   HENT:    Head: Normocephalic.   Mouth/Throat: Mucous membranes are normal.   Eyes: No scleral icterus.   Neck: Normal range of motion.   Cardiovascular: Regular rhythm and normal heart sounds. Tachycardia present.   Pulmonary/Chest: Effort normal. She has no wheezes. She has rhonchi. She has no rales.   Musculoskeletal: Normal range of motion.   Neurological: She is alert.   Skin: Skin is warm and dry.   Psychiatric: Mood and affect normal.   Nursing note and vitals reviewed.      LAB RESULTS  Recent Results (from the past 24 hour(s))   Comprehensive Metabolic Panel    Collection Time: 02/17/20  9:33 AM   Result Value Ref Range    Glucose 118 (H) 65 - 99 mg/dL    BUN 10 8 - 23 mg/dL    Creatinine 0.85 0.57 - 1.00 mg/dL    Sodium 139 136 - 145 mmol/L    Potassium 3.9 3.5 - 5.2 mmol/L    Chloride 101 98 - 107 mmol/L    CO2 25.6 22.0 - 29.0 mmol/L    Calcium 8.6 8.6 - 10.5 mg/dL    Total Protein 6.0 6.0 - 8.5 g/dL    Albumin 2.60 (L) 3.50 - 5.20 g/dL    ALT (SGPT) 13 1 - 33 U/L    AST (SGOT) 23 1 - 32 U/L    Alkaline Phosphatase 126 (H) 39 - 117 U/L    Total Bilirubin 0.3 0.2 - 1.2 mg/dL    eGFR Non African Amer 66 >60 mL/min/1.73    Globulin 3.4 gm/dL    A/G Ratio 0.8 g/dL    BUN/Creatinine Ratio 11.8 7.0 - 25.0    Anion Gap 12.4 5.0 - 15.0 mmol/L   Protime-INR    Collection Time: 02/17/20  9:33 AM   Result Value Ref Range    Protime 13.8 11.7 - 14.2 Seconds    INR 1.09 0.90 - 1.10   BNP    Collection Time: 02/17/20  9:33 AM   Result Value Ref Range    proBNP 1,366.0 (H) 5.0 - 900.0 pg/mL   Troponin    Collection Time: 02/17/20  9:33 AM   Result Value Ref Range    Troponin T <0.010 0.000 - 0.030 ng/mL   CBC Auto Differential    Collection Time: 02/17/20  9:33 AM   Result Value Ref Range    WBC 16.75 (H) 3.40 - 10.80 10*3/mm3    RBC 3.06 (L) 3.77 - 5.28 10*6/mm3    Hemoglobin 8.6 (L) 12.0 - 15.9 g/dL    Hematocrit 26.3 (L) 34.0 - 46.6 %    MCV 85.9 79.0 - 97.0 fL    MCH 28.1 26.6 - 33.0 pg    MCHC 32.7 31.5 - 35.7 g/dL    RDW  13.9 12.3 - 15.4 %    RDW-SD 42.7 37.0 - 54.0 fl    MPV 9.1 6.0 - 12.0 fL    Platelets 414 140 - 450 10*3/mm3    Neutrophil % 79.1 (H) 42.7 - 76.0 %    Lymphocyte % 8.1 (L) 19.6 - 45.3 %    Monocyte % 7.8 5.0 - 12.0 %    Eosinophil % 2.1 0.3 - 6.2 %    Basophil % 0.4 0.0 - 1.5 %    Immature Grans % 2.5 (H) 0.0 - 0.5 %    Neutrophils, Absolute 13.25 (H) 1.70 - 7.00 10*3/mm3    Lymphocytes, Absolute 1.36 0.70 - 3.10 10*3/mm3    Monocytes, Absolute 1.30 (H) 0.10 - 0.90 10*3/mm3    Eosinophils, Absolute 0.35 0.00 - 0.40 10*3/mm3    Basophils, Absolute 0.07 0.00 - 0.20 10*3/mm3    Immature Grans, Absolute 0.42 (H) 0.00 - 0.05 10*3/mm3    nRBC 0.0 0.0 - 0.2 /100 WBC       I ordered the above labs and reviewed the results    RADIOLOGY  CT Angiogram Chest   Preliminary Result   1. No convincing evidence of pulmonary artery thromboembolism.   2. CT findings suggestive of multifocal pneumonia. Multiple enlarged   mediastinal and bilateral hilar lymph nodes are favored to be reactive.   Follow-up CT chest is recommended in 8-12 weeks following treatment to   ensure resolution.       These findings were discussed with Zuri Dwyer by telephone.       Radiation dose reduction techniques were utilized, including automated   exposure control and exposure modulation based on body size.                  I ordered the above noted radiological studies and reviewed the images on the PACS system.      EKG    ekg was interpreted by , see ' note for interpretation.      PROGRESS AND CONSULTS       1031: Spoke with pt about iodine allergy. She reports she has become nauseated with it in the past but denies respiratory distress or angioedema after contrast. Discussed with . Plan to give Zofran before contrast.     1025: Reviewed pt's history and workup with .  At bedside evaluation, they agree with the plan of care.    1139: Spoke with  (radiology) who reports cta chest showed  "multi-focal PNA but was negative for PE    1229: Rechecked pt who is resting comfortably and in NAD. O2 sat 90-94% on 2L NC. Informed pt of all workup results and discussed plan to admit. Pt understands and agrees with the plan, all questions answered.    1243: Spoke with DONA Anand ([facility]) who agreed to admit to  ([facility])      ADMISSION    Discussed treatment plan and reason for admission with pt/family and admitting physician.  Pt/family voiced understanding of the plan for admission for further testing/treatment as needed.      DIAGNOSIS  Final diagnoses:   Multi focal pneumonia of both lungs due to infectious organism, unspecified part of lung   Dyspnea, unspecified type   Anemia, unspecified type         COURSE & MEDICAL DECISION MAKING  Pertinent Labs and Imaging studies that were ordered and reviewed are noted above.  Results were reviewed/discussed with the patient and they were also made aware of online assess.   Pt also made aware that some labs, such as cultures, will not be resulted during ER visit and follow up with PMD is necessary.     MEDICATIONS GIVEN IN ER  Medications   sodium chloride 0.9 % flush 10 mL (has no administration in time range)   cefTRIAXone (ROCEPHIN) IVPB 1 g (1 g Intravenous New Bag 2/17/20 1236)   azithromycin (ZITHROMAX) 500 mg 0.9% NaCl (Add-vantage) 250 mL (has no administration in time range)   ondansetron (ZOFRAN) injection 4 mg (4 mg Intravenous Given 2/17/20 1041)   iopamidol (ISOVUE-370) 76 % injection 100 mL (95 mL Intravenous Given by Other 2/17/20 1059)       /56 (BP Location: Left arm, Patient Position: Lying)   Pulse 108   Temp 98.5 °F (36.9 °C) (Tympanic)   Resp 18   Ht 162.6 cm (64\")   Wt 97.8 kg (215 lb 9.6 oz)   SpO2 96%   BMI 37.01 kg/m²       I personally reviewed the past medical history, past surgical history, social history, family history, current medications and allergies as they appear in this chart.  The scribe's note accurately reflects " the work and decisions made by me.     Documentation assistance provided by kathrin Jenkins for DONA Stevens on 2/17/2020 at 12:57 PM. Information recorded by the [email or web address] was done at my direction and has been verified and validated by me.               Ruth Jenkins  02/17/20 1257       Zuri Dwyer APRN  02/17/20 1400

## 2020-02-17 NOTE — ED NOTES
Patient's O2 dropping down into the high 80's. Patient placed on 2L of oxygen nasal cannula. Will continue to monitor.      Denise Gerber RN  02/17/20 1018

## 2020-02-17 NOTE — H&P
Patient Name:  Ellen Adamson  YOB: 1950  MRN:  [id number]  Admit Date:  2/17/2020  Patient Care Team:  Grant Layne MD as PCP - General  Grant Layne MD as PCP - Family Medicine      Subjective   History Present Illness     Chief Complaint   Patient presents with   • Shortness of Breath       Ms. Adamson is a 69 y.o. with a history of asthma, COPD, CKD 3 and GERD that presents to [facility] complaining of shortness of breath.  Patient had a right hip replacement done by Dr. Stafford 1 week ago and developed worsening shortness of breath following surgery.  At home she tried Mucinex and Spiriva without relief.  Patient states she went to [facility] to get Augmentin but symptoms still did not improve and she decided to come to the ED.  Upon arrival, her oxygen saturations were noted to be in the mid 80s, she was placed on 2 L NC and saturations came up to the low 90s.  She denies fever, chills, cough, chest pain, palpitations, nausea, vomiting and diarrhea.  She has noticed increased swelling in her bilateral lower extremities, right greater than left.  She denies being on any anticoagulation.    Review of Systems   Constitutional: Negative for appetite change, chills and fever.   HENT: Negative for congestion and sore throat.    Eyes: Negative for discharge and itching.   Respiratory: Positive for shortness of breath. Negative for cough, chest tightness and wheezing.    Cardiovascular: Positive for leg swelling. Negative for chest pain and palpitations.   Gastrointestinal: Negative for abdominal pain and constipation.   Endocrine: Negative for cold intolerance and heat intolerance.   Genitourinary: Negative for difficulty urinating and dysuria.   Musculoskeletal: Negative for gait problem and neck pain.   Skin: Negative for color change and rash.   Allergic/Immunologic: Negative for environmental allergies and food allergies.   Neurological: Negative for dizziness and  "headaches.   Hematological: Negative for adenopathy. Does not bruise/bleed easily.   Psychiatric/Behavioral: Negative for agitation and behavioral problems.        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)     \"IN MY NOSE\"   • CKD (chronic kidney disease) 2019    STAGE 3   • COPD (chronic obstructive pulmonary disease) (CMS/[facility])    • GERD (gastroesophageal reflux disease)    • Lumbar stenosis    • Right hip pain    • Sjogren's syndrome (CMS/HCC)      Past Surgical History:   Procedure Laterality Date   • ANKLE SURGERY      RIGHT   • APPENDECTOMY     • CHOLECYSTECTOMY     • COLONOSCOPY     • ELBOW PROCEDURE Left    • ENDOSCOPY     • HYSTERECTOMY     • TOTAL HIP ARTHROPLASTY Right 2/10/2020    Procedure: TOTAL HIP ARTHROPLASTY ANTERIOR WITH HANA TABLE;  Surgeon: Jann Khan MD;  Location: [facility];  Service: Orthopedics;  Laterality: Right;     Family History   Problem Relation Age of Onset   • No Known Problems Mother    • No Known Problems Father    • Malig Hyperthermia Neg Hx      Social History     Tobacco Use   • Smoking status: Former Smoker     Packs/day: 1.00     Years: 25.00     Pack years: 25.00     Types: Cigarettes     Last attempt to quit: 10/16/2019     Years since quittin.3   Substance Use Topics   • Alcohol use: Not Currently   • Drug use: Never     No current facility-administered medications on file prior to encounter.      Current Outpatient Medications on File Prior to Encounter   Medication Sig Dispense Refill   • ALPRAZolam (XANAX) 1 MG tablet Take 1 mg by mouth 4 (Four) Times a Day As Needed.     • buPROPion SR (WELLBUTRIN SR) 150 MG 12 hr tablet Take 150 mg by mouth 2 (Two) Times a Day.     • DULoxetine (CYMBALTA) 60 MG capsule Take 60 mg by mouth Every Morning.     • HYDROcodone-acetaminophen (NORCO)  MG per tablet Take 1-2 tablets by mouth Every 4 " (Four) Hours As Needed for Moderate Pain  (max 8/day) for up to 9 days. 56 tablet 0   • vilazodone (VIIBRYD) 40 MG tablet tablet Take 40 mg by mouth Every Morning.     • [DISCONTINUED] aspirin 81 MG EC tablet Take 1 tablet by mouth Every 12 (Twelve) Hours. 60 tablet 0   • [DISCONTINUED] atorvastatin (LIPITOR) 40 MG tablet Take 40 mg by mouth Every Morning.     • [DISCONTINUED] senna-docusate sodium (SENOKOT-S) 8.6-50 MG tablet Take 2 tablets by mouth 2 (Two) Times a Day. 60 tablet 1     Allergies   Allergen Reactions   • Iodine Nausea And Vomiting   • Shellfish-Derived Products Nausea And Vomiting       Objective    Objective     Vital Signs  Temp:  [98.5 °F (36.9 °C)] 98.5 °F (36.9 °C)  Heart Rate:  [] 98  Resp:  [18] 18  BP: (108-144)/(49-96) 111/59  SpO2:  [92 %-98 %] 95 %  on  Flow (L/min):  [2] 2;   Device (Oxygen Therapy): room air  Body mass index is 37.01 kg/m².    Physical Exam   Constitutional: She is oriented to person, place, and time. She appears well-developed and well-nourished. No distress.   HENT:   Head: Normocephalic and atraumatic.   Eyes: Conjunctivae and EOM are normal.   Neck: Normal range of motion. Neck supple.   Cardiovascular: Normal rate and regular rhythm.   Pulmonary/Chest: Effort normal. No respiratory distress. She has decreased breath sounds. She has no wheezes. She has rales.   Abdominal: Soft. Bowel sounds are normal. She exhibits no distension. There is no tenderness.   Musculoskeletal: She exhibits edema (BLE R>L).   limited ROM right hip   Neurological: She is alert and oriented to person, place, and time.   Skin: Skin is warm and dry.   Psychiatric: She has a normal mood and affect. Her behavior is normal.   Nursing note and vitals reviewed.      Results Review:  I reviewed the patient's new clinical results.  I reviewed the patient's new imaging results and agree with the interpretation.  I reviewed the patient's other test results and agree with the interpretation  I  personally viewed and interpreted the patient's EKG/Telemetry data  Discussed with ED provider.    Lab Results (last 24 hours)     Procedure Component Value Units Date/Time    CBC & Differential [[id number]] Collected:  02/17/20 0933    Specimen:  Blood Updated:  02/17/20 0945    Narrative:       The following orders were created for panel order CBC & Differential.  Procedure                               Abnormality         Status                     ---------                               -----------         ------                     CBC Auto Differential[[id number]]        Abnormal            Final result                 Please view results for these tests on the individual orders.    Comprehensive Metabolic Panel [[id number]]  (Abnormal) Collected:  02/17/20 0933    Specimen:  Blood Updated:  02/17/20 1008     Glucose 118 mg/dL      BUN 10 mg/dL      Creatinine 0.85 mg/dL      Sodium 139 mmol/L      Potassium 3.9 mmol/L      Chloride 101 mmol/L      CO2 25.6 mmol/L      Calcium 8.6 mg/dL      Total Protein 6.0 g/dL      Albumin 2.60 g/dL      ALT (SGPT) 13 U/L      AST (SGOT) 23 U/L      Alkaline Phosphatase 126 U/L      Total Bilirubin 0.3 mg/dL      eGFR Non African Amer 66 mL/min/1.73      Globulin 3.4 gm/dL      A/G Ratio 0.8 g/dL      BUN/Creatinine Ratio 11.8     Anion Gap 12.4 mmol/L     Narrative:       GFR Normal >60  Chronic Kidney Disease <60  Kidney Failure <15      Protime-INR [[id number]]  (Normal) Collected:  02/17/20 0933    Specimen:  Blood Updated:  02/17/20 0954     Protime 13.8 Seconds      INR 1.09    BNP [[id number]]  (Abnormal) Collected:  02/17/20 0933    Specimen:  Blood Updated:  02/17/20 1004     proBNP 1,366.0 pg/mL     Narrative:       Among patients with dyspnea, NT-proBNP is highly sensitive for the detection of acute congestive heart failure. In addition NT-proBNP of <300 pg/ml effectively rules out acute congestive heart failure with 99% negative predictive value.    Results may be  falsely decreased if patient taking Biotin.      Troponin [[id number]]  (Normal) Collected:  02/17/20 0933    Specimen:  Blood Updated:  02/17/20 1008     Troponin T <0.010 ng/mL     Narrative:       Troponin T Reference Range:  <= 0.03 ng/mL-   Negative for AMI  >0.03 ng/mL-     Abnormal for myocardial necrosis.  Clinicians would have to utilize clinical acumen, EKG, Troponin and serial changes to determine if it is an Acute Myocardial Infarction or myocardial injury due to an underlying chronic condition.       Results may be falsely decreased if patient taking Biotin.      CBC Auto Differential [[id number]]  (Abnormal) Collected:  02/17/20 0933    Specimen:  Blood Updated:  02/17/20 0945     WBC 16.75 10*3/mm3      RBC 3.06 10*6/mm3      Hemoglobin 8.6 g/dL      Hematocrit 26.3 %      MCV 85.9 fL      MCH 28.1 pg      MCHC 32.7 g/dL      RDW 13.9 %      RDW-SD 42.7 fl      MPV 9.1 fL      Platelets 414 10*3/mm3      Neutrophil % 79.1 %      Lymphocyte % 8.1 %      Monocyte % 7.8 %      Eosinophil % 2.1 %      Basophil % 0.4 %      Immature Grans % 2.5 %      Neutrophils, Absolute 13.25 10*3/mm3      Lymphocytes, Absolute 1.36 10*3/mm3      Monocytes, Absolute 1.30 10*3/mm3      Eosinophils, Absolute 0.35 10*3/mm3      Basophils, Absolute 0.07 10*3/mm3      Immature Grans, Absolute 0.42 10*3/mm3      nRBC 0.0 /100 WBC     Blood Culture - Blood, Arm, Right [[id number]] Collected:  02/17/20 1228    Specimen:  Blood from Arm, Right Updated:  02/17/20 1247    Lactic Acid, Plasma [[id number]]  (Normal) Collected:  02/17/20 1228    Specimen:  Blood Updated:  02/17/20 1311     Lactate 0.8 mmol/L     Blood Culture - Blood, Arm, Left [[id number]] Collected:  02/17/20 1234    Specimen:  Blood from Arm, Left Updated:  02/17/20 1247    Respiratory Panel, PCR - Swab, Nasopharynx [[id number]]  (Normal) Collected:  02/17/20 1407    Specimen:  Swab from Nasopharynx Updated:  02/17/20 1513     ADENOVIRUS, PCR Not Detected      Coronavirus 229E Not Detected     Coronavirus HKU1 Not Detected     Coronavirus NL63 Not Detected     Coronavirus OC43 Not Detected     Human Metapneumovirus Not Detected     Human Rhinovirus/Enterovirus Not Detected     Influenza B PCR Not Detected     Parainfluenza Virus 1 Not Detected     Parainfluenza Virus 2 Not Detected     Parainfluenza Virus 3 Not Detected     Parainfluenza Virus 4 Not Detected     Bordetella pertussis pcr Not Detected     Influenza A H1 2009 PCR Not Detected     Chlamydophila pneumoniae PCR Not Detected     Mycoplasma pneumo by PCR Not Detected     Influenza A PCR Not Detected     Influenza A H3 Not Detected     Influenza A H1 Not Detected     RSV, PCR Not Detected     Bordetella parapertussis PCR Not Detected          Imaging Results (Last 24 Hours)     Procedure Component Value Units Date/Time    CT Angiogram Chest [[id number]] Collected:  02/17/20 1307     Updated:  02/17/20 1307    Narrative:       CT ANGIOGRAM CHEST WITH CONTRAST, PULMONARY EMBOLISM PROTOCOL     HISTORY: Shortness of breath. Status post hip arthroplasty.     TECHNIQUE: Spiral CT images were obtained from the lung apices to the  diaphragmatic domes following administration of intravenous contrast in  the angiographic phase. Coronal, sagittal and 3-D volume rendered  reformats were then obtained.     COMPARISON: CT chest with contrast from 09/23/2015.     FINDINGS: The pulmonary arteries are well opacified with intravenous  contrast. No convincing filling defect is identified to suggest  pulmonary artery thromboembolism. The central airways are patent without  endobronchial lesion. Dependent secretions are seen within the right  main bronchus. There is dense area of consolidation seen within the left  lower lobe. Additionally infiltrates are demonstrated bilaterally within  all of the aerated lung fields. There is a small left pleural effusion  present. No pericardial effusion. Enlarged mediastinal lymph nodes  are  present. Index AP window lymph node measures up to 1 cm in short axis  dimension. A subcarinal lymph node measures up to 1.3 cm. Enlarged  bilateral hilar lymphadenopathy is also seen.     The visualized upper abdomen is unremarkable. No pathological axillary  lymphadenopathy.       Impression:       1. No convincing evidence of pulmonary artery thromboembolism.  2. CT findings suggestive of multifocal pneumonia. Multiple enlarged  mediastinal and bilateral hilar lymph nodes are favored to be reactive.  Follow-up CT chest is recommended in 8-12 weeks following treatment to  ensure resolution.     These findings were discussed with Zuri Dwyer by telephone.     Radiation dose reduction techniques were utilized, including automated  exposure control and exposure modulation based on body size.                     ECG 12 Lead   Preliminary Result   HEART RATE= 100  bpm   RR Interval= 596  ms   ID Interval= 140  ms   P Horizontal Axis= -2  deg   P Front Axis= 73  deg   QRSD Interval= 75  ms   QT Interval= 325  ms   QRS Axis= 59  deg   T Wave Axis= 35  deg   - OTHERWISE NORMAL ECG -   Sinus tachycardia   Electronically Signed By:    Date and Time of Study: 2020-02-17 09:50:44           Assessment/Plan     Active Hospital Problems    Diagnosis  POA   • **Pneumonia of both lungs due to infectious organism [J18.9]  Yes   • Asthma [J45.909]  Yes   • COPD with hypoxia (CMS/HCC) [J44.9, R09.02]  Yes   • GERD without esophagitis [K21.9]  Yes   • CKD (chronic kidney disease) stage 3, GFR 30-59 ml/min (CMS/HCC) [N18.3]  Unknown   • Leukocytosis [D72.829]  Unknown      Resolved Hospital Problems   No resolved problems to display.     PNA/Hypoxia   - continue ceftriaxone and azithromycin  - blood cultures and urine antigens pending, RVP negative   - duo nebs, mucinex, incentive spirometry and wean o2 as tolerated,  - CTA negative for PE  - given recent right hip surgery, will go ahead and check a doppler to rule out  DVT. monitor labs.      CKD3  - renal function stable     · I discussed the patient's findings and my recommendations with patient, family, nursing staff and ED provider.    VTE Prophylaxis - Pharmacy to dose Lovenox.  Code Status - Full code.       DONA Vu  [facility]  02/17/20  3:46 PM

## 2020-02-17 NOTE — PROGRESS NOTES
Clinical Pharmacy Services: Medication History    Ellen Adamson is a 69 y.o. 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    Allergies as of 02/17/2020 - Reviewed 02/17/2020   Allergen Reaction Noted    Iodine Nausea And Vomiting 04/07/2016    Shellfish-derived products Nausea And Vomiting 02/04/2020       Medication information was obtained from: patient  Pharmacy and Phone Number:     Prior to Admission Medications       Prescriptions Last Dose Informant Patient Reported? Taking?    ALPRAZolam (XANAX) 1 MG tablet  Self Yes Yes    Take 1 mg by mouth 4 (Four) Times a Day As Needed.    buPROPion SR (WELLBUTRIN SR) 150 MG 12 hr tablet  Self Yes Yes    Take 150 mg by mouth 2 (Two) Times a Day.    DULoxetine (CYMBALTA) 60 MG capsule  Self Yes Yes    Take 60 mg by mouth Every Morning.    HYDROcodone-acetaminophen (NORCO)  MG per tablet  Self No Yes    Take 1-2 tablets by mouth Every 4 (Four) Hours As Needed for Moderate Pain  (max 8/day) for up to 9 days.    vilazodone (VIIBRYD) 40 MG tablet tablet  Self Yes Yes    Take 40 mg by mouth Every Morning.                Medication notes:     This medication list is complete to the best of my knowledge as of 2/17/2020    Please call if questions.    Mignon Garcia [facility]  Medication History Technician  [phone]    2/17/2020 5:49 PM

## 2020-02-18 PROBLEM — R33.8 ACUTE URINARY RETENTION: Status: ACTIVE | Noted: 2020-02-18

## 2020-02-18 LAB
ANION GAP SERPL CALCULATED.3IONS-SCNC: 9.6 MMOL/L (ref 5–15)
BASOPHILS # BLD AUTO: 0.07 10*3/MM3 (ref 0–0.2)
BASOPHILS NFR BLD AUTO: 0.5 % (ref 0–1.5)
BILIRUB UR QL STRIP: NEGATIVE
BUN BLD-MCNC: 9 MG/DL (ref 8–23)
BUN/CREAT SERPL: 11.3 (ref 7–25)
CALCIUM SPEC-SCNC: 8.2 MG/DL (ref 8.6–10.5)
CHLORIDE SERPL-SCNC: 103 MMOL/L (ref 98–107)
CLARITY UR: CLEAR
CO2 SERPL-SCNC: 26.4 MMOL/L (ref 22–29)
COLOR UR: YELLOW
CREAT BLD-MCNC: 0.8 MG/DL (ref 0.57–1)
DEPRECATED RDW RBC AUTO: 43.5 FL (ref 37–54)
EOSINOPHIL # BLD AUTO: 0.59 10*3/MM3 (ref 0–0.4)
EOSINOPHIL NFR BLD AUTO: 4.1 % (ref 0.3–6.2)
ERYTHROCYTE [DISTWIDTH] IN BLOOD BY AUTOMATED COUNT: 13.8 % (ref 12.3–15.4)
GFR SERPL CREATININE-BSD FRML MDRD: 71 ML/MIN/1.73
GLUCOSE BLD-MCNC: 108 MG/DL (ref 65–99)
GLUCOSE UR STRIP-MCNC: NEGATIVE MG/DL
HCT VFR BLD AUTO: 23.7 % (ref 34–46.6)
HCT VFR BLD AUTO: 23.9 % (ref 34–46.6)
HGB BLD-MCNC: 7.5 G/DL (ref 12–15.9)
HGB BLD-MCNC: 7.5 G/DL (ref 12–15.9)
HGB UR QL STRIP.AUTO: NEGATIVE
IMM GRANULOCYTES # BLD AUTO: 0.59 10*3/MM3 (ref 0–0.05)
IMM GRANULOCYTES NFR BLD AUTO: 4.1 % (ref 0–0.5)
KETONES UR QL STRIP: NEGATIVE
LEUKOCYTE ESTERASE UR QL STRIP.AUTO: NEGATIVE
LYMPHOCYTES # BLD AUTO: 1.99 10*3/MM3 (ref 0.7–3.1)
LYMPHOCYTES NFR BLD AUTO: 14 % (ref 19.6–45.3)
MCH RBC QN AUTO: 27.6 PG (ref 26.6–33)
MCHC RBC AUTO-ENTMCNC: 31.4 G/DL (ref 31.5–35.7)
MCV RBC AUTO: 87.9 FL (ref 79–97)
MONOCYTES # BLD AUTO: 1.07 10*3/MM3 (ref 0.1–0.9)
MONOCYTES NFR BLD AUTO: 7.5 % (ref 5–12)
NEUTROPHILS # BLD AUTO: 9.92 10*3/MM3 (ref 1.7–7)
NEUTROPHILS NFR BLD AUTO: 69.8 % (ref 42.7–76)
NITRITE UR QL STRIP: NEGATIVE
NRBC BLD AUTO-RTO: 0.1 /100 WBC (ref 0–0.2)
PH UR STRIP.AUTO: 5.5 [PH] (ref 5–8)
PLATELET # BLD AUTO: 397 10*3/MM3 (ref 140–450)
PMV BLD AUTO: 9.1 FL (ref 6–12)
POTASSIUM BLD-SCNC: 3.9 MMOL/L (ref 3.5–5.2)
PROT UR QL STRIP: NEGATIVE
RBC # BLD AUTO: 2.72 10*6/MM3 (ref 3.77–5.28)
SODIUM BLD-SCNC: 139 MMOL/L (ref 136–145)
SP GR UR STRIP: >=1.03 (ref 1–1.03)
UROBILINOGEN UR QL STRIP: NORMAL
WBC NRBC COR # BLD: 14.23 10*3/MM3 (ref 3.4–10.8)

## 2020-02-18 PROCEDURE — 85018 HEMOGLOBIN: CPT | Performed by: NURSE PRACTITIONER

## 2020-02-18 PROCEDURE — 25010000002 AZITHROMYCIN PER 500 MG: Performed by: NURSE PRACTITIONER

## 2020-02-18 PROCEDURE — 85025 COMPLETE CBC W/AUTO DIFF WBC: CPT | Performed by: NURSE PRACTITIONER

## 2020-02-18 PROCEDURE — 81003 URINALYSIS AUTO W/O SCOPE: CPT | Performed by: NURSE PRACTITIONER

## 2020-02-18 PROCEDURE — 25010000002 CEFTRIAXONE PER 250 MG: Performed by: NURSE PRACTITIONER

## 2020-02-18 PROCEDURE — 85014 HEMATOCRIT: CPT | Performed by: NURSE PRACTITIONER

## 2020-02-18 PROCEDURE — 94799 UNLISTED PULMONARY SVC/PX: CPT

## 2020-02-18 PROCEDURE — 80048 BASIC METABOLIC PNL TOTAL CA: CPT | Performed by: HOSPITALIST

## 2020-02-18 PROCEDURE — 97161 PT EVAL LOW COMPLEX 20 MIN: CPT

## 2020-02-18 PROCEDURE — 25010000002 ENOXAPARIN PER 10 MG: Performed by: NURSE PRACTITIONER

## 2020-02-18 PROCEDURE — 97110 THERAPEUTIC EXERCISES: CPT

## 2020-02-18 RX ORDER — ALPRAZOLAM 1 MG/1
1 TABLET ORAL ONCE
Status: COMPLETED | OUTPATIENT
Start: 2020-02-18 | End: 2020-02-18

## 2020-02-18 RX ORDER — ALPRAZOLAM 0.5 MG/1
0.5 TABLET ORAL 4 TIMES DAILY PRN
Status: DISCONTINUED | OUTPATIENT
Start: 2020-02-18 | End: 2020-02-18

## 2020-02-18 RX ORDER — AZITHROMYCIN 250 MG/1
500 TABLET, FILM COATED ORAL
Status: COMPLETED | OUTPATIENT
Start: 2020-02-19 | End: 2020-02-19

## 2020-02-18 RX ADMIN — IPRATROPIUM BROMIDE AND ALBUTEROL SULFATE 3 ML: 2.5; .5 SOLUTION RESPIRATORY (INHALATION) at 16:28

## 2020-02-18 RX ADMIN — SODIUM CHLORIDE 75 ML/HR: 9 INJECTION, SOLUTION INTRAVENOUS at 15:15

## 2020-02-18 RX ADMIN — GUAIFENESIN 1200 MG: 600 TABLET, EXTENDED RELEASE ORAL at 20:36

## 2020-02-18 RX ADMIN — DULOXETINE HYDROCHLORIDE 60 MG: 60 CAPSULE, DELAYED RELEASE ORAL at 06:06

## 2020-02-18 RX ADMIN — IPRATROPIUM BROMIDE AND ALBUTEROL SULFATE 3 ML: 2.5; .5 SOLUTION RESPIRATORY (INHALATION) at 08:18

## 2020-02-18 RX ADMIN — IPRATROPIUM BROMIDE AND ALBUTEROL SULFATE 3 ML: 2.5; .5 SOLUTION RESPIRATORY (INHALATION) at 20:02

## 2020-02-18 RX ADMIN — ENOXAPARIN SODIUM 40 MG: 40 INJECTION SUBCUTANEOUS at 20:37

## 2020-02-18 RX ADMIN — BUPROPION HYDROCHLORIDE 150 MG: 150 TABLET, EXTENDED RELEASE ORAL at 08:37

## 2020-02-18 RX ADMIN — SODIUM CHLORIDE, PRESERVATIVE FREE 10 ML: 5 INJECTION INTRAVENOUS at 20:38

## 2020-02-18 RX ADMIN — VILAZODONE HYDROCHLORIDE 40 MG: 40 TABLET ORAL at 06:06

## 2020-02-18 RX ADMIN — GUAIFENESIN 1200 MG: 600 TABLET, EXTENDED RELEASE ORAL at 08:37

## 2020-02-18 RX ADMIN — ACETAMINOPHEN 650 MG: 325 TABLET, FILM COATED ORAL at 19:49

## 2020-02-18 RX ADMIN — CEFTRIAXONE SODIUM 1 G: 1 INJECTION, SOLUTION INTRAVENOUS at 11:59

## 2020-02-18 RX ADMIN — IPRATROPIUM BROMIDE AND ALBUTEROL SULFATE 3 ML: 2.5; .5 SOLUTION RESPIRATORY (INHALATION) at 12:44

## 2020-02-18 RX ADMIN — ALPRAZOLAM 1 MG: 0.25 TABLET ORAL at 00:32

## 2020-02-18 RX ADMIN — AZITHROMYCIN 500 MG: 500 INJECTION, POWDER, LYOPHILIZED, FOR SOLUTION INTRAVENOUS at 13:32

## 2020-02-18 RX ADMIN — BUPROPION HYDROCHLORIDE 150 MG: 150 TABLET, EXTENDED RELEASE ORAL at 20:36

## 2020-02-18 RX ADMIN — ALPRAZOLAM 1 MG: 1 TABLET ORAL at 23:32

## 2020-02-18 NOTE — PROGRESS NOTES
Discharge Planning Assessment  [facility]     Patient Name: Ellen Adamson  MRN: [id number]  Today's Date: 2/18/2020    Admit Date: 2/17/2020    Discharge Needs Assessment     Row Name 02/18/20 0757       Living Environment    Lives With  spouse    Name(s) of Who Lives With Patient  Spouse  ( Ramírez Adamson [phone])    Current Living Arrangements  home/apartment/condo    Primary Care Provided by  self    Provides Primary Care For  no one    Family Caregiver if Needed  spouse    Family Caregiver Names  Spouse  ( Ramírez Adamson [phone])    Quality of Family Relationships  involved;supportive    Able to Return to Prior Arrangements  yes    Living Arrangement Comments  Pt lives in a single story house with spouse  ( Ramírez Adamson [phone]).       Resource/Environmental Concerns    Resource/Environmental Concerns  none    Transportation Concerns  car, none       Transition Planning    Patient/Family Anticipates Transition to  home with family;home with help/services    Patient/Family Anticipated Services at Transition  none    Transportation Anticipated  family or friend will provide       Discharge Needs Assessment    Readmission Within the Last 30 Days  current reason for admission unrelated to previous admission    Concerns to be Addressed  denies needs/concerns at this time    Equipment Currently Used at Home  cane, straight;commode;walker, rolling    Anticipated Changes Related to Illness  none    Equipment Needed After Discharge  cane, straight;commode;walker, rolling        Discharge Plan     Row Name 02/18/20 0759       Plan    Plan  Plan home with [facility] Kovacs RN    Patient/Family in Agreement with Plan  yes    Plan Comments  FACE SHEET VERIFIED/ IM LETTER SIGNED.  Spoke to pt and pt's spouse  (Ramírez Adamson) at bedside.   Pt's PCP is Dr. Grant Layne.   Pt lives in a single story house with spouse  ( Ramírez Adamson [phone]).  Pt is independent with ADL's.  Pt has a straight cane,  BSC, and rolling walker for home use.  Pt gets prescriptions at [facility]  ( [facility]).  Pt denies any issues affording medications.  Pt is current with [facility]  [facility] Arechiga ( [phone])  called to follow.  Pt has not been in SNF.  Plan home with [facility]  [facility] Kovacs, RN        Destination      Coordination has not been started for this encounter.      Durable Medical Equipment      Coordination has not been started for this encounter.      Dialysis/Infusion      Coordination has not been started for this encounter.      Home Medical Care      Service Provider Request Status Selected Services Address Phone Number Fax Number    [facility] Pending - Request Sent N/A [address] [id number] [phone]      Therapy      Coordination has not been started for this encounter.      Community Resources      Coordination has not been started for this encounter.          Demographic Summary     Row Name 02/18/20 0757       General Information    Admission Type  inpatient    Arrived From  emergency department    Required Notices Provided  Important Message from Medicare    Referral Source  admission list    Reason for Consult  discharge planning    Preferred Language  English     Used During This Interaction  no        Functional Status     Row Name 02/18/20 0757       Functional Status    Usual Activity Tolerance  good    Current Activity Tolerance  fair       Functional Status, IADL    Medications  independent    Meal Preparation  independent    Housekeeping  independent    Laundry  independent    Shopping  independent       Mental Status    General Appearance WDL  WDL       Mental Status Summary    Recent Changes in Mental Status/Cognitive Functioning  no changes        Psychosocial    No documentation.       Abuse/Neglect    No documentation.       Legal    No documentation.       Substance Abuse    No documentation.       Patient Forms    No  documentation.           Cassie Kovacs, RN

## 2020-02-18 NOTE — THERAPY EVALUATION
"Patient Name: Ellen Adamson  : 1950    MRN: [id number]                              Today's Date: 2020       Admit Date: 2020    Visit Dx:     ICD-10-CM ICD-9-CM   1. Multi focal pneumonia of both lungs due to infectious organism, unspecified part of lung J18.9 483.8   2. Dyspnea, unspecified type R06.00 786.09   3. 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)     \"IN MY NOSE\"   • CKD (chronic kidney disease) 2019    STAGE 3   • COPD (chronic obstructive pulmonary disease) (CMS/[facility])    • GERD (gastroesophageal reflux disease)    • Lumbar stenosis    • Right hip pain    • Sjogren's syndrome (CMS/[facility])      Past Surgical History:   Procedure Laterality Date   • ANKLE SURGERY      RIGHT   • APPENDECTOMY     • CHOLECYSTECTOMY     • COLONOSCOPY     • ELBOW PROCEDURE Left    • ENDOSCOPY     • HYSTERECTOMY     • TOTAL HIP ARTHROPLASTY Right 2/10/2020    Procedure: TOTAL HIP ARTHROPLASTY ANTERIOR WITH HANA TABLE;  Surgeon: Jann Khan MD;  Location: [facility];  Service: Orthopedics;  Laterality: Right;     General Information     Row Name 20 1515          PT Evaluation Time/Intention    Document Type  evaluation Pt. admitted with PNA;  Recent Right THR (Anterior) 2/10/2020  -MS     Mode of Treatment  physical therapy;individual therapy  -MS     Row Name 20 1515          General Information    Patient Profile Reviewed?  yes  -MS     Prior Level of Function  independent: With use of Rwx.   -MS     Existing Precautions/Restrictions  (S) " fall  -MS     Barriers to Rehab  none identified  -MS     Row Name 02/18/20 1515          Cognitive Assessment/Intervention- PT/OT    Orientation Status (Cognition)  oriented x 3  -MS     Row Name 02/18/20 1515          Safety Issues, Functional Mobility    Comment, Safety Issues/Impairments (Mobility)  Gait belt used for safety.   -MS       User Key  (r) = Recorded By, (t) = Taken By, (c) = Cosigned By    Initials Name Provider Type    MS YateserArun, PT Physical Therapist        Mobility     Row Name 02/18/20 1517          Bed Mobility Assessment/Treatment    Bed Mobility Assessment/Treatment  supine-sit;sit-supine  -MS     Supine-Sit [place] (Bed Mobility)  minimum assist (75% patient effort)  -MS     Sit-Supine [place] (Bed Mobility)  contact guard  -MS     Row Name 02/18/20 1517          Sit-Stand Transfer    Sit-Stand [place] (Transfers)  contact guard  -MS     Assistive Device (Sit-Stand Transfers)  walker, front-wheeled  -MS     Row Name 02/18/20 1517          Gait/Stairs Assessment/Training    [place] Level (Gait)  contact guard  -MS     Assistive Device (Gait)  walker, front-wheeled  -MS     Distance in Feet (Gait)  80 feet  -MS     Pattern (Gait)  step-through  -MS     Deviations/Abnormal Patterns (Gait)  antalgic;juan decreased  -MS     Comment (Gait/Stairs)  Limited by overall fatigue and SOA with upright activity.  Verbal/tactile cues for posture correction and RWx guidance.   -MS       User Key  (r) = Recorded By, (t) = Taken By, (c) = Cosigned By    Initials Name Provider Type    Arun Montes, PT Physical Therapist        Obj/Interventions     Row Name 02/18/20 1518          General ROM    GENERAL ROM COMMENTS  BUE/LE (WFL's)  -MS     Row Name 02/18/20 1518          MMT (Manual Muscle Testing)    General MMT Comments  BUE/LE (3+/5)   -MS     Row Name 02/18/20 1518          Therapeutic Exercise    Comment (Therapeutic Exercise)  Right THR ther. ex. program x 10  reps completed  -MS       User Key  (r) = Recorded By, (t) = Taken By, (c) = Cosigned By    Initials Name Provider Type    MS DavisGaitanArun, PT Physical Therapist        Goals/Plan     Row Name 02/18/20 1519          Bed Mobility Goal 1 (PT)    Activity/Assistive Device (Bed Mobility Goal 1, PT)  bed mobility activities, all  -MS     [place] Level/Cues Needed (Bed Mobility Goal 1, PT)  independent  -MS     Time Frame (Bed Mobility Goal 1, PT)  long term goal (LTG);5 days  -MS     Row Name 02/18/20 1519          Transfer Goal 1 (PT)    Activity/Assistive Device (Transfer Goal 1, PT)  transfers, all;walker, rolling  -MS     [place] Level/Cues Needed (Transfer Goal 1, PT)  independent  -MS     Time Frame (Transfer Goal 1, PT)  long term goal (LTG);5 days  -MS     Row Name 02/18/20 1519          Gait Training Goal 1 (PT)    Activity/Assistive Device (Gait Training Goal 1, PT)  gait (walking locomotion)  -MS     [place] Level (Gait Training Goal 1, PT)  independent  -MS     Distance (Gait Goal 1, PT)  150 feet  -MS     Time Frame (Gait Training Goal 1, PT)  long term goal (LTG);5 days  -MS       User Key  (r) = Recorded By, (t) = Taken By, (c) = Cosigned By    Initials Name Provider Type    MS GaitanArun, PT Physical Therapist        Clinical Impression     Row Name 02/18/20 1519          Pain Assessment    Additional Documentation  Pain Scale: Numbers Pre/Post-Treatment (Group)  -MS     Row Name 02/18/20 1519          Pain Scale: Numbers Pre/Post-Treatment    Pain Scale: Numbers, Pretreatment  3/10  -MS     Pain Scale: Numbers, Post-Treatment  3/10  -MS     Pain Location - Side  Right  -MS     Pain Location  hip  -MS     Row Name 02/18/20 1519          Plan of Care Review    Plan of Care Reviewed With  patient  -MS     Row Name 02/18/20 1519          Physical Therapy Clinical Impression    Criteria for Skilled Interventions Met (PT Clinical Impression)  treatment indicated  -MS     Rehab  Potential (PT Clinical Summary)  good, to achieve stated therapy goals  -MS     Row Name 02/18/20 1519          Positioning and Restraints    Pre-Treatment Position  in bed  -MS     Post Treatment Position  bed  -MS     In Bed  notified nsg;supine;call light within reach;encouraged to call for assist;exit alarm on;with family/caregiver All lines intact.   -MS       User Key  (r) = Recorded By, (t) = Taken By, (c) = Cosigned By    Initials Name Provider Type    Arun Montes, PT Physical Therapist        Outcome Measures     Row Name 02/18/20 1521          How much help from another person do you currently need...    Turning from your back to your side while in flat bed without using bedrails?  3  -MS     Moving from lying on back to sitting on the side of a flat bed without bedrails?  3  -MS     Moving to and from a bed to a chair (including a wheelchair)?  3  -MS     Standing up from a chair using your arms (e.g., wheelchair, bedside chair)?  3  -MS     Climbing 3-5 steps with a railing?  3  -MS     To walk in hospital room?  3  -MS     AM-PAC 6 Clicks Score (PT)  18  -MS     Row Name 02/18/20 1521          Functional Assessment    Outcome Measure Options  AM-PAC 6 Clicks Basic Mobility (PT)  -MS       User Key  (r) = Recorded By, (t) = Taken By, (c) = Cosigned By    Initials Name Provider Type    Arun Mnotes, PT Physical Therapist          PT Recommendation and Plan  Planned Therapy Interventions (PT Eval): balance training, bed mobility training, gait training, home exercise program, patient/family education, postural re-education, transfer training, strengthening  Outcome Summary/Treatment Plan (PT)  Anticipated Discharge Disposition (PT): home with assist, home with home health  Plan of Care Reviewed With: patient  Outcome Summary: Pt. admitted to the hospital with PNA and recent Right THR (2/10/2020).  Pt. reports that prior to admission she was independent with functional mobility.  Pt.  currently presents with decreased strength, decreased balance, and decreased tolerance to functional activity. Pt. will benefit from skilled inpt. P.T. to address her functional deficits and to assist pt. in regaining her maximum level of independence with functional mobility.     Time Calculation:   PT Charges     Row Name 02/18/20 1523             Time Calculation    Start Time  1430  -MS      Stop Time  1445  -MS      Time Calculation (min)  15 min  -MS      PT Received On  02/18/20  -MS      PT - Next Appointment  02/19/20  -MS      PT Goal Re-Cert Due Date  02/23/20  -MS         Time Calculation- PT    Total Timed Code Minutes- PT  13 minute(s)  -MS        User Key  (r) = Recorded By, (t) = Taken By, (c) = Cosigned By    Initials Name Provider Type    Arun Montes, PT Physical Therapist        Therapy Charges for Today     Code Description Service Date Service Provider Modifiers Qty    40615750334 HC PT EVAL LOW COMPLEXITY 1 2/18/2020 Arun Gaitan, PT GP 1    36712979452 HC PT THER PROC EA 15 MIN 2/18/2020 Arun Gaitan, PT GP 1          PT G-Codes  Outcome Measure Options: AM-PAC 6 Clicks Basic Mobility (PT)  AM-PAC 6 Clicks Score (PT): 18    Arun Gaitan PT  2/18/2020

## 2020-02-18 NOTE — PROGRESS NOTES
"Pharmacy to Dose Enoxaparin    Patient: Ellen Adamson (E648/1,  LOS: 0 days )  Relevant clinical data and objective history reviewed:  69 y.o. female 162.6 cm (64\") 97.8 kg (215 lb 9.6 oz)  Body mass index is 37.01 kg/m².  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    Documented weights    02/17/20 0931   Weight: 97.8 kg (215 lb 9.6 oz)     Consult for Lulu Silver APRN  Indication: VTE ppx    Other Anticoagulation: none    Estimated Creatinine Clearance: 68.9 mL/min (by C-G formula based on SCr of 0.85 mg/dL).  Body mass index is 37.01 kg/m².    Creatinine   Date Value Ref Range Status   02/17/2020 0.85 0.57 - 1.00 mg/dL Final     Platelets   Date Value Ref Range Status   02/17/2020 414 140 - 450 10*3/mm3 Final     Lab Results   Component Value Date    INR 1.09 02/17/2020     No results found for: DDIMERQUANT    PLAN:  Will start Lovenox 40 mg SubQ every 24 hr and close consult.      Guzman Obando, PharmD  02/17/20 7:36 PM      "

## 2020-02-18 NOTE — PROGRESS NOTES
Name: Ellen Adamson ADMIT: 2020   : 1950  PCP: Grant Layne MD    MRN: [id number] LOS: 1 days   AGE/SEX: 69 y.o. female  ROOM: [facility]/     Subjective   Subjective   patient with urinary retention today, otherwise no new complaints. per friend present at bedside, patient seems to be having confusion and was not making sense throughout the night. she kept asking to go to the hospital for medical care despite already being here. per friend, she has never known patient to be on xanax.    Review of Systems   Constitutional: Negative for chills and fever.   Respiratory: Negative for cough and shortness of breath.    Cardiovascular: Negative for chest pain and palpitations.   Gastrointestinal: Negative for nausea and vomiting.   Genitourinary: Positive for difficulty urinating.   Musculoskeletal: Positive for gait problem.   Skin: Positive for wound (s/p right hip replacement, wound dressing changed today, CDI).        Objective   Objective   Vital Signs  Temp:  [98.1 °F (36.7 °C)-98.4 °F (36.9 °C)] 98.1 °F (36.7 °C)  Heart Rate:  [] 104  Resp:  [16-18] 16  BP: (102-123)/(41-59) 108/41  SpO2:  [92 %-100 %] 100 %  on  Flow (L/min):  [2] 2;   Device (Oxygen Therapy): nasal cannula  Body mass index is 37.01 kg/m².  Physical Exam   Constitutional: She is oriented to person, place, and time. She appears well-developed and well-nourished. No distress.   Cardiovascular: Normal rate and regular rhythm.   Pulmonary/Chest: Effort normal. No respiratory distress. She has decreased breath sounds. She has rales.   Abdominal: Soft. Bowel sounds are normal. She exhibits no distension. There is no tenderness.   Musculoskeletal: She exhibits edema (trace ble).   limited r hip s/p surgery   Neurological: She is alert and oriented to person, place, and time.   Psychiatric: She has a normal mood and affect. Her behavior is normal.   Nursing note and vitals reviewed.      Results Review:       I reviewed the  patient's new clinical results.  Results from last 7 days   Lab Units 02/18/20  0748 02/17/20  1951 02/17/20  0933   WBC 10*3/mm3 14.23*  --  16.75*   HEMOGLOBIN g/dL 7.5* 8.2* 8.6*   PLATELETS 10*3/mm3 397  --  414     Results from last 7 days   Lab Units 02/18/20  0748 02/17/20  0933   SODIUM mmol/L 139 139   POTASSIUM mmol/L 3.9 3.9   CHLORIDE mmol/L 103 101   CO2 mmol/L 26.4 25.6   BUN mg/dL 9 10   CREATININE mg/dL 0.80 0.85   GLUCOSE mg/dL 108* 118*   Estimated Creatinine Clearance: 73.2 mL/min (by C-G formula based on SCr of 0.8 mg/dL).  Results from last 7 days   Lab Units 02/17/20  0933   ALBUMIN g/dL 2.60*   BILIRUBIN mg/dL 0.3   ALK PHOS U/L 126*   AST (SGOT) U/L 23   ALT (SGPT) U/L 13     Results from last 7 days   Lab Units 02/18/20  0748 02/17/20  0933   CALCIUM mg/dL 8.2* 8.6   ALBUMIN g/dL  --  2.60*     Results from last 7 days   Lab Units 02/17/20  1228   LACTATE mmol/L 0.8     No results found for: HGBA1C, POCGLU      azithromycin 500 mg Intravenous Q24H   buPROPion  mg Oral BID   cefTRIAXone 1 g Intravenous Q24H   DULoxetine 60 mg Oral QAM   enoxaparin 40 mg Subcutaneous Q24H   guaiFENesin 1,200 mg Oral Q12H   ipratropium-albuterol 3 mL Nebulization 4x Daily - RT   sodium chloride 10 mL Intravenous Q12H   sodium chloride 10 mL Intravenous Q12H   vilazodone 40 mg Oral QAM       sodium chloride 75 mL/hr Last Rate: 75 mL/hr (02/17/20 2128)   Diet Regular       Assessment/Plan     Active Hospital Problems    Diagnosis  POA   • **Pneumonia of both lungs due to infectious organism [J18.9]  Yes   • Acute urinary retention [R33.8]  Unknown   • Asthma [J45.909]  Yes   • COPD with hypoxia (CMS/HCC) [J44.9, R09.02]  Yes   • GERD without esophagitis [K21.9]  Yes   • CKD (chronic kidney disease) stage 3, GFR 30-59 ml/min (CMS/[facility]) [N18.3]  Unknown   • Leukocytosis [D72.829]  Unknown      Resolved Hospital Problems   No resolved problems to display.     PNA/Hypoxia  - continue ceftriaxone and  azithromycin. can DC azithromycin tomorrow after third dose. RVP negative, blood cultures NGTD  - CTA and doppler negative for clots   - follow-up CT of the chest in 8 to 12 weeks to make sure reactive lymph nodes have resolved  - continue supportive care and wean o2 as tolerated   - white count trending down     Anemia  - denies dizziness, lightheadedness and overt signs of bleeding   - will monitor closely, if she drops any more or becomes symptomatic, we will transfuse   - repeat hemoglobin at 3pm today    CKD3  - stable     Acute urinary retention  - I&O cath, PVRs, if bladder scan great than 350mL, repeat cath then follow hospital protocol  - consider urology consult if does not resolve   - UA negative     Confusion  - she was alert and oriented for me at time of interview   - discontinue norco and will decrease xanax to 0.5mg  - monitor overnight    · Pharmacy to dose Lovenox for DVT prophylaxis.  · Full code.  · Discussed with patient and nursing staff.  · Anticipate discharge home in 1-2 days.      DONA Vu  [facility]  02/18/20  11:23 AM

## 2020-02-18 NOTE — PLAN OF CARE
Problem: Fall Risk (Adult)  Goal: Identify Related Risk Factors and Signs and Symptoms  Outcome: Ongoing (interventions implemented as appropriate)     Problem: Pneumonia (Adult)  Goal: Signs and Symptoms of Listed Potential Problems Will be Absent, Minimized or Managed (Pneumonia)  Outcome: Ongoing (interventions implemented as appropriate)     Problem: Wound (Includes Pressure Injury) (Adult)  Goal: Signs and Symptoms of Listed Potential Problems Will be Absent, Minimized or Managed (Wound)  Outcome: Ongoing (interventions implemented as appropriate)     Problem: Patient Care Overview  Goal: Plan of Care Review  Outcome: Ongoing (interventions implemented as appropriate)   Pt slept most of the shift, denies pain, nausea or soa but o2 sensors says 88% o2 on room air, O@ 2lpm/nc given to keep sats above 90%. Voide lg amount x1 only, taken to br x2 without results. ST on monitor with hr in low 100s. Afebrile, no c/o of sob at this time.  Pt with constant tremors to shari feet, xanax prn given to pt. Cont to monitor.

## 2020-02-18 NOTE — PROGRESS NOTES
Continued Stay Note  [facility]     Patient Name: Ellen Adamson  MRN: [id number]  Today's Date: 2/18/2020    Admit Date: 2/17/2020    Discharge Plan     Row Name 02/18/20 0759       Plan    Plan  Plan home with JOSIAS Kovacs RN    Patient/Family in Agreement with Plan  yes    Plan Comments   FACE SHEET VERIFIED/ IM LETTER SIGNED.  Spoke to pt and pt's spouse  (Ramírez Adamson) at bedside.   Pt's PCP is Dr. Grant Layne.   Pt lives in a single story house with spouse  ( Ramírez Adamson [phone]).  Pt is independent with ADL's.  Pt has a straight cane, BSC, and rolling walker for home use.  Pt gets prescriptions at [facility]  ( [facility]).  Pt denies any issues affording medications.  Pt is current with [facility]  [facility] Arechiga ( [phone])  called to follow.  Pt has not been in SNF.  Plan home with JOSIAS Kovacs RN        Discharge Codes    No documentation.             Cassie Kovacs RN

## 2020-02-18 NOTE — PLAN OF CARE
Problem: Patient Care Overview  Goal: Plan of Care Review  Flowsheets (Taken 2/18/2020 1522)  Plan of Care Reviewed With: patient  Outcome Summary: Pt. admitted to the hospital with PNA and recent Right THR (2/10/2020).  Pt. reports that prior to admission she was independent with functional mobility.  Pt. currently presents with decreased strength, decreased balance, and decreased tolerance to functional activity. Pt. will benefit from skilled inpt. P.T. to address her functional deficits and to assist pt. in regaining her maximum level of independence with functional mobility.

## 2020-02-18 NOTE — DISCHARGE PLACEMENT REQUEST
"Ellen James (69 y.o. Female)     Date of Birth Social Security Number Address Home Phone MRN    1950  [facility] [address] [id number] [phone] [id number]    Confucianism Marital Status          Spiritism        Admission Date Admission Type Admitting Provider Attending Provider Department, Room/Bed    2/17/20 Emergency Dario Henry MD [place], Robert Russell, MD [address], E648/1    Discharge Date Discharge Disposition Discharge Destination                       Attending Provider:  Kyle Costa MD    Allergies:  Iodine, Shellfish-derived Products    Isolation:  None   Infection:  None   Code Status:  CPR    Ht:  162.6 cm (64\")   Wt:  97.8 kg (215 lb 9.6 oz)    Admission Cmt:  None   Principal Problem:  Pneumonia of both lungs due to infectious organism [J18.9]                 Active Insurance as of 2/17/2020     Primary Coverage     Payor Plan Insurance Group Employer/Plan Group    MEDICARE MEDICARE A & B      Payor Plan Address Payor Plan Phone Number Payor Plan Fax Number Effective Dates    [address] [phone]  11/1/2013 - None Entered    [facility] [id number]       Subscriber Name Subscriber Birth Date Member ID       ELLEN JAMES 1950 [id number]           Secondary Coverage     Payor Plan Insurance Group Employer/Plan Group    HUMANA HUMANA [employer] SUP              [id number]     Payor Plan Address Payor Plan Phone Number Payor Plan Fax Number Effective Dates    [address] [address]   8/1/2018 - None Entered    [facility] [id number]       Subscriber Name Subscriber Birth Date Member ID       ELLEN JAMES 1950 [id number]                 Emergency Contacts      (Rel.) Home Phone Work Phone Mobile Phone    Ramírez James (Spouse) [phone] -- [phone]              "

## 2020-02-18 NOTE — PROGRESS NOTES
Orthopedic Progress Note    Subjective :   Patient seen and examined. Has no complaints of her left hip. She reports she can bear weight and ambulate as much as she needs. Continues to use cane and/or walker for stability.     Objective :    Vital signs in last 24 hours:  Temp:  [98.1 °F (36.7 °C)-98.5 °F (36.9 °C)] 98.1 °F (36.7 °C)  Heart Rate:  [] 103  Resp:  [18] 18  BP: (102-144)/(41-96) 108/41  Vitals:    02/17/20 2106 02/17/20 2111 02/17/20 2342 02/18/20 0713   BP:   102/50 108/41   BP Location:   Left arm Left arm   Patient Position:   Lying Lying   Pulse: 101 103 100 103   Resp: 18 18 18 18   Temp:   98.4 °F (36.9 °C) 98.1 °F (36.7 °C)   TempSrc:   Oral Oral   SpO2: 96%  92% 92%   Weight:       Height:           PHYSICAL EXAM:  Patient is calm, in no acute distress, awake and oriented x 3.  Dressing clean, dry and intact.  No signs of infection.  Swelling is appropriate.  Ecchymosis is appropriate in amount.  Homans test is negative.  Patient is neurovascularly intact distally.    LABS:  Results from last 7 days   Lab Units 02/17/20 1951 02/17/20  0933   WBC 10*3/mm3  --  16.75*   HEMOGLOBIN g/dL 8.2* 8.6*   HEMATOCRIT % 25.0* 26.3*   PLATELETS 10*3/mm3  --  414     Results from last 7 days   Lab Units 02/17/20  0933   SODIUM mmol/L 139   POTASSIUM mmol/L 3.9   CHLORIDE mmol/L 101   CO2 mmol/L 25.6   BUN mg/dL 10   CREATININE mg/dL 0.85   GLUCOSE mg/dL 118*   CALCIUM mg/dL 8.6     Results from last 7 days   Lab Units 02/17/20  0933   INR  1.09       ASSESSMENT:  Pneumonia.   1 week S/P right total hip arthroplasty    Plan:  Replace dressing with new island dressing.   Apply ice to right hip as needed.   She can follow up in the office in 1 week  We will sign off. Feel free to contact us with questions.     Liz Otero, APRN    Date: 2/18/2020  Time: @NOW@

## 2020-02-18 NOTE — PLAN OF CARE
States had one episode of SOA after laughing with family. Room air. Infreq NP congested cough. IV antx given. RLE incision intact and dressed. Unable to urinate spontaneously.  Amb with PT in hallway. Telemetry ST rate 100-112.  Problem: Patient Care Overview  Goal: Plan of Care Review  Outcome: Ongoing (interventions implemented as appropriate)  Goal: Individualization and Mutuality  Outcome: Ongoing (interventions implemented as appropriate)  Goal: Discharge Needs Assessment  Outcome: Ongoing (interventions implemented as appropriate)  Goal: Interprofessional Rounds/Family Conf  Outcome: Ongoing (interventions implemented as appropriate)     Problem: Patient Care Overview  Goal: Plan of Care Review  Outcome: Ongoing (interventions implemented as appropriate)  Goal: Individualization and Mutuality  Outcome: Ongoing (interventions implemented as appropriate)  Goal: Discharge Needs Assessment  Outcome: Ongoing (interventions implemented as appropriate)  Goal: Interprofessional Rounds/Family Conf  Outcome: Ongoing (interventions implemented as appropriate)     Problem: Fall Risk (Adult)  Goal: Identify Related Risk Factors and Signs and Symptoms  Outcome: Ongoing (interventions implemented as appropriate)  Goal: Absence of Fall  Outcome: Ongoing (interventions implemented as appropriate)     Problem: Pneumonia (Adult)  Goal: Signs and Symptoms of Listed Potential Problems Will be Absent, Minimized or Managed (Pneumonia)  Outcome: Ongoing (interventions implemented as appropriate)     Problem: Wound (Includes Pressure Injury) (Adult)  Goal: Signs and Symptoms of Listed Potential Problems Will be Absent, Minimized or Managed (Wound)  Outcome: Ongoing (interventions implemented as appropriate)

## 2020-02-19 ENCOUNTER — APPOINTMENT (OUTPATIENT)
Dept: GENERAL RADIOLOGY | Facility: HOSPITAL | Age: 70
End: 2020-02-19

## 2020-02-19 LAB
ABO GROUP BLD: NORMAL
ANION GAP SERPL CALCULATED.3IONS-SCNC: 11.3 MMOL/L (ref 5–15)
BASOPHILS # BLD AUTO: 0.06 10*3/MM3 (ref 0–0.2)
BASOPHILS NFR BLD AUTO: 0.4 % (ref 0–1.5)
BLD GP AB SCN SERPL QL: NEGATIVE
BUN BLD-MCNC: 7 MG/DL (ref 8–23)
BUN/CREAT SERPL: 8.9 (ref 7–25)
CALCIUM SPEC-SCNC: 8.4 MG/DL (ref 8.6–10.5)
CHLORIDE SERPL-SCNC: 101 MMOL/L (ref 98–107)
CO2 SERPL-SCNC: 24.7 MMOL/L (ref 22–29)
CREAT BLD-MCNC: 0.79 MG/DL (ref 0.57–1)
DEPRECATED RDW RBC AUTO: 43.4 FL (ref 37–54)
EOSINOPHIL # BLD AUTO: 0.62 10*3/MM3 (ref 0–0.4)
EOSINOPHIL NFR BLD AUTO: 4.4 % (ref 0.3–6.2)
ERYTHROCYTE [DISTWIDTH] IN BLOOD BY AUTOMATED COUNT: 13.7 % (ref 12.3–15.4)
GFR SERPL CREATININE-BSD FRML MDRD: 72 ML/MIN/1.73
GLUCOSE BLD-MCNC: 112 MG/DL (ref 65–99)
HCT VFR BLD AUTO: 23.3 % (ref 34–46.6)
HGB BLD-MCNC: 7.4 G/DL (ref 12–15.9)
IMM GRANULOCYTES # BLD AUTO: 0.62 10*3/MM3 (ref 0–0.05)
IMM GRANULOCYTES NFR BLD AUTO: 4.4 % (ref 0–0.5)
LYMPHOCYTES # BLD AUTO: 1.81 10*3/MM3 (ref 0.7–3.1)
LYMPHOCYTES NFR BLD AUTO: 12.9 % (ref 19.6–45.3)
MCH RBC QN AUTO: 27.8 PG (ref 26.6–33)
MCHC RBC AUTO-ENTMCNC: 31.8 G/DL (ref 31.5–35.7)
MCV RBC AUTO: 87.6 FL (ref 79–97)
MONOCYTES # BLD AUTO: 0.96 10*3/MM3 (ref 0.1–0.9)
MONOCYTES NFR BLD AUTO: 6.8 % (ref 5–12)
NEUTROPHILS # BLD AUTO: 9.99 10*3/MM3 (ref 1.7–7)
NEUTROPHILS NFR BLD AUTO: 71.1 % (ref 42.7–76)
NRBC BLD AUTO-RTO: 0 /100 WBC (ref 0–0.2)
NT-PROBNP SERPL-MCNC: 1207 PG/ML (ref 5–900)
PLATELET # BLD AUTO: 444 10*3/MM3 (ref 140–450)
PMV BLD AUTO: 9.5 FL (ref 6–12)
POTASSIUM BLD-SCNC: 3.6 MMOL/L (ref 3.5–5.2)
RBC # BLD AUTO: 2.66 10*6/MM3 (ref 3.77–5.28)
RH BLD: POSITIVE
SODIUM BLD-SCNC: 137 MMOL/L (ref 136–145)
T&S EXPIRATION DATE: NORMAL
URATE SERPL-MCNC: 3.9 MG/DL (ref 2.4–5.7)
WBC NRBC COR # BLD: 14.06 10*3/MM3 (ref 3.4–10.8)

## 2020-02-19 PROCEDURE — 25010000002 FUROSEMIDE PER 20 MG: Performed by: INTERNAL MEDICINE

## 2020-02-19 PROCEDURE — 94799 UNLISTED PULMONARY SVC/PX: CPT

## 2020-02-19 PROCEDURE — 86850 RBC ANTIBODY SCREEN: CPT | Performed by: NURSE PRACTITIONER

## 2020-02-19 PROCEDURE — 84550 ASSAY OF BLOOD/URIC ACID: CPT | Performed by: INTERNAL MEDICINE

## 2020-02-19 PROCEDURE — 97110 THERAPEUTIC EXERCISES: CPT

## 2020-02-19 PROCEDURE — 85025 COMPLETE CBC W/AUTO DIFF WBC: CPT | Performed by: NURSE PRACTITIONER

## 2020-02-19 PROCEDURE — 80048 BASIC METABOLIC PNL TOTAL CA: CPT | Performed by: NURSE PRACTITIONER

## 2020-02-19 PROCEDURE — 86901 BLOOD TYPING SEROLOGIC RH(D): CPT | Performed by: NURSE PRACTITIONER

## 2020-02-19 PROCEDURE — 71046 X-RAY EXAM CHEST 2 VIEWS: CPT

## 2020-02-19 PROCEDURE — 86901 BLOOD TYPING SEROLOGIC RH(D): CPT

## 2020-02-19 PROCEDURE — 25010000002 CEFTRIAXONE PER 250 MG: Performed by: NURSE PRACTITIONER

## 2020-02-19 PROCEDURE — 86900 BLOOD TYPING SEROLOGIC ABO: CPT | Performed by: NURSE PRACTITIONER

## 2020-02-19 PROCEDURE — 25010000002 ENOXAPARIN PER 10 MG: Performed by: NURSE PRACTITIONER

## 2020-02-19 PROCEDURE — 86900 BLOOD TYPING SEROLOGIC ABO: CPT

## 2020-02-19 PROCEDURE — 83880 ASSAY OF NATRIURETIC PEPTIDE: CPT | Performed by: INTERNAL MEDICINE

## 2020-02-19 RX ORDER — BISACODYL 10 MG
10 SUPPOSITORY, RECTAL RECTAL DAILY
Status: DISCONTINUED | OUTPATIENT
Start: 2020-02-19 | End: 2020-02-20 | Stop reason: HOSPADM

## 2020-02-19 RX ORDER — CHOLECALCIFEROL (VITAMIN D3) 125 MCG
5 CAPSULE ORAL NIGHTLY
Status: DISCONTINUED | OUTPATIENT
Start: 2020-02-19 | End: 2020-02-20 | Stop reason: HOSPADM

## 2020-02-19 RX ORDER — DOCUSATE SODIUM 100 MG/1
100 CAPSULE, LIQUID FILLED ORAL 2 TIMES DAILY
Status: DISCONTINUED | OUTPATIENT
Start: 2020-02-19 | End: 2020-02-20 | Stop reason: HOSPADM

## 2020-02-19 RX ORDER — POLYETHYLENE GLYCOL 3350 17 G/17G
17 POWDER, FOR SOLUTION ORAL DAILY
Status: DISCONTINUED | OUTPATIENT
Start: 2020-02-19 | End: 2020-02-20 | Stop reason: HOSPADM

## 2020-02-19 RX ORDER — FUROSEMIDE 10 MG/ML
40 INJECTION INTRAMUSCULAR; INTRAVENOUS EVERY 12 HOURS
Status: DISCONTINUED | OUTPATIENT
Start: 2020-02-19 | End: 2020-02-20

## 2020-02-19 RX ADMIN — DULOXETINE HYDROCHLORIDE 60 MG: 60 CAPSULE, DELAYED RELEASE ORAL at 07:06

## 2020-02-19 RX ADMIN — IPRATROPIUM BROMIDE AND ALBUTEROL SULFATE 3 ML: 2.5; .5 SOLUTION RESPIRATORY (INHALATION) at 20:51

## 2020-02-19 RX ADMIN — POLYETHYLENE GLYCOL 3350 17 G: 17 POWDER, FOR SOLUTION ORAL at 11:41

## 2020-02-19 RX ADMIN — Medication 5 MG: at 22:00

## 2020-02-19 RX ADMIN — ENOXAPARIN SODIUM 40 MG: 40 INJECTION SUBCUTANEOUS at 21:59

## 2020-02-19 RX ADMIN — DOCUSATE SODIUM 100 MG: 100 CAPSULE, LIQUID FILLED ORAL at 22:06

## 2020-02-19 RX ADMIN — AZITHROMYCIN DIHYDRATE 500 MG: 250 TABLET, FILM COATED ORAL at 11:40

## 2020-02-19 RX ADMIN — VILAZODONE HYDROCHLORIDE 40 MG: 40 TABLET ORAL at 07:06

## 2020-02-19 RX ADMIN — DOCUSATE SODIUM 100 MG: 100 CAPSULE, LIQUID FILLED ORAL at 11:42

## 2020-02-19 RX ADMIN — BUPROPION HYDROCHLORIDE 150 MG: 150 TABLET, EXTENDED RELEASE ORAL at 22:00

## 2020-02-19 RX ADMIN — SODIUM CHLORIDE, PRESERVATIVE FREE 10 ML: 5 INJECTION INTRAVENOUS at 22:00

## 2020-02-19 RX ADMIN — GUAIFENESIN 1200 MG: 600 TABLET, EXTENDED RELEASE ORAL at 08:40

## 2020-02-19 RX ADMIN — SODIUM CHLORIDE, PRESERVATIVE FREE 10 ML: 5 INJECTION INTRAVENOUS at 22:06

## 2020-02-19 RX ADMIN — GUAIFENESIN 1200 MG: 600 TABLET, EXTENDED RELEASE ORAL at 21:59

## 2020-02-19 RX ADMIN — IPRATROPIUM BROMIDE AND ALBUTEROL SULFATE 3 ML: 2.5; .5 SOLUTION RESPIRATORY (INHALATION) at 10:40

## 2020-02-19 RX ADMIN — CEFTRIAXONE SODIUM 1 G: 1 INJECTION, SOLUTION INTRAVENOUS at 11:40

## 2020-02-19 RX ADMIN — FUROSEMIDE 40 MG: 10 INJECTION, SOLUTION INTRAMUSCULAR; INTRAVENOUS at 15:51

## 2020-02-19 RX ADMIN — BUPROPION HYDROCHLORIDE 150 MG: 150 TABLET, EXTENDED RELEASE ORAL at 08:40

## 2020-02-19 RX ADMIN — SODIUM CHLORIDE, PRESERVATIVE FREE 10 ML: 5 INJECTION INTRAVENOUS at 08:40

## 2020-02-19 RX ADMIN — IPRATROPIUM BROMIDE AND ALBUTEROL SULFATE 3 ML: 2.5; .5 SOLUTION RESPIRATORY (INHALATION) at 15:07

## 2020-02-19 RX ADMIN — BISACODYL 10 MG: 10 SUPPOSITORY RECTAL at 11:41

## 2020-02-19 NOTE — PROGRESS NOTES
Name: Ellen Adamson ADMIT: 2020   : 1950  PCP: Grant Layne MD    MRN: [id number] LOS: 2 days   AGE/SEX: 69 y.o. female  ROOM: [facility] [place]/     Subjective   Subjective   patient still with some urinary retention and with confusion overnight again last night. she is requiring oxygen today.     Review of Systems   Constitutional: Negative for chills and fever.   Respiratory: Negative for cough and shortness of breath.    Cardiovascular: Negative for chest pain and palpitations.   Gastrointestinal: Positive for constipation. Negative for nausea and vomiting.   Genitourinary: Positive for difficulty urinating. Negative for dysuria.   Musculoskeletal: Positive for gait problem.   Skin: Positive for wound (s/p right hip replacement, wound dressing CDI).        Objective   Objective   Vital Signs  Temp:  [97.6 °F (36.4 °C)-98.4 °F (36.9 °C)] 97.9 °F (36.6 °C)  Heart Rate:  [] 90  Resp:  [16-20] 18  BP: (106-129)/(47-70) 120/50  SpO2:  [92 %-100 %] 92 %  on  Flow (L/min):  [2-3] 2;   Device (Oxygen Therapy): nasal cannula  Body mass index is 37.01 kg/m².  Physical Exam   Constitutional: She is oriented to person, place, and time. She appears well-developed and well-nourished. No distress.   Cardiovascular: Normal rate and regular rhythm.   Pulmonary/Chest: Effort normal. No respiratory distress. She has decreased breath sounds. She has rales.   Abdominal: Soft. Bowel sounds are normal. She exhibits no distension. There is no tenderness.   Musculoskeletal: She exhibits edema (trace ble).   limited r hip s/p surgery   Neurological: She is alert and oriented to person, place, and time.   Psychiatric: She has a normal mood and affect. Her behavior is normal.   Nursing note and vitals reviewed.      Results Review:       I reviewed the patient's new clinical results.  Results from last 7 days   Lab Units 20  0528 20  1448 20  0748 20  1951 20  0933   WBC 10*3/mm3 14.06*  --   14.23*  --  16.75*   HEMOGLOBIN g/dL 7.4* 7.5* 7.5* 8.2* 8.6*   PLATELETS 10*3/mm3 444  --  397  --  414     Results from last 7 days   Lab Units 02/19/20  0528 02/18/20  0748 02/17/20  0933   SODIUM mmol/L 137 139 139   POTASSIUM mmol/L 3.6 3.9 3.9   CHLORIDE mmol/L 101 103 101   CO2 mmol/L 24.7 26.4 25.6   BUN mg/dL 7* 9 10   CREATININE mg/dL 0.79 0.80 0.85   GLUCOSE mg/dL 112* 108* 118*   Estimated Creatinine Clearance: 73.2 mL/min (by C-G formula based on SCr of 0.79 mg/dL).  Results from last 7 days   Lab Units 02/17/20  0933   ALBUMIN g/dL 2.60*   BILIRUBIN mg/dL 0.3   ALK PHOS U/L 126*   AST (SGOT) U/L 23   ALT (SGPT) U/L 13     Results from last 7 days   Lab Units 02/19/20  0528 02/18/20  0748 02/17/20  0933   CALCIUM mg/dL 8.4* 8.2* 8.6   ALBUMIN g/dL  --   --  2.60*     Results from last 7 days   Lab Units 02/17/20  1228   LACTATE mmol/L 0.8     No results found for: HGBA1C, POCGLU      bisacodyl 10 mg Rectal Daily   buPROPion  mg Oral BID   cefTRIAXone 1 g Intravenous Q24H   docusate sodium 100 mg Oral BID   DULoxetine 60 mg Oral QAM   enoxaparin 40 mg Subcutaneous Q24H   guaiFENesin 1,200 mg Oral Q12H   ipratropium-albuterol 3 mL Nebulization 4x Daily - RT   polyethylene glycol 17 g Oral Daily   sodium chloride 10 mL Intravenous Q12H   sodium chloride 10 mL Intravenous Q12H   vilazodone 40 mg Oral QAM      Diet Regular       Assessment/Plan     Active Hospital Problems    Diagnosis  POA   • **Pneumonia of both lungs due to infectious organism [J18.9]  Yes   • Acute urinary retention [R33.8]  Unknown   • Asthma [J45.909]  Yes   • COPD with hypoxia (CMS/HCC) [J44.9, R09.02]  Yes   • GERD without esophagitis [K21.9]  Yes   • CKD (chronic kidney disease) stage 3, GFR 30-59 ml/min (CMS/[facility]) [N18.3]  Unknown   • Leukocytosis [D72.829]  Unknown      Resolved Hospital Problems   No resolved problems to display.     PNA/Hypoxia  - continue ceftriaxone, will change to PO omnicef at time of DC  - RVP  negative, blood cultures NGTD  - CTA and doppler negative for clots   - follow-up CT of the chest in 8 to 12 weeks to make sure reactive lymph nodes have resolved  - continue supportive care and wean o2 as tolerated   - white count trending down     Anemia  - denies dizziness, lightheadedness and overt signs of bleeding, but will go ahead and transfuse with one unit of PRBCs as hgb was 11 preoperatively    CKD3  - stable     Acute urinary retention  - PVRs, if bladder scan greater than 450mL I&O cath  - consider urology consult if does not resolve   - UA negative     Constipation   - initiate bowel regimen with mirlax, colace and suppository (PRN)    Confusion  - she seems to be alert during the day and having some sort of delirium/sundowning at night, which should improve once she is back home. could possibly also be due to constipation and urine retention. Norco and Xanax have been discontinued. will will watch her one more night.    · Pharmacy to dose Lovenox for DVT prophylaxis.  · Full code.  · Discussed with patient and nursing staff.  · Anticipate discharge home tomorrow.      DONA Vu  [facility]  02/19/20  12:21 PM

## 2020-02-19 NOTE — THERAPY TREATMENT NOTE
Acute Care - Physical Therapy Treatment Note  [facility]     Patient Name: Ellen Adamson  : 1950  MRN: [id number]  Today's Date: 2020             Admit Date: 2020    Visit Dx:    ICD-10-CM ICD-9-CM   1. Multi focal pneumonia of both lungs due to infectious organism, unspecified part of lung J18.9 483.8   2. Dyspnea, unspecified type R06.00 786.09   3. Anemia, unspecified type D64.9 285.9     Patient Active Problem List   Diagnosis   • Osteoarthritis of right hip   • Pneumonia of both lungs due to infectious organism   • Asthma   • COPD with hypoxia (CMS/[facility])   • GERD without esophagitis   • CKD (chronic kidney disease) stage 3, GFR 30-59 ml/min (CMS/[facility])   • Leukocytosis   • Acute urinary retention       Therapy Treatment    Rehabilitation Treatment Summary     Row Name 20 1427             Treatment Time/Intention    Discipline  physical therapist  -EF      Document Type  therapy note (daily note)  -EF      Subjective Information  complains of;dyspnea at end of session  -EF      Mode of Treatment  physical therapy  -EF      Patient/Family Observations  visitor present  -EF      Patient Effort  good  -EF      Existing Precautions/Restrictions  fall  -EF      Recorded by [EF] Kirstin Reynoso, PT 20 1430      Row Name 20 1427             Bed Mobility Assessment/Treatment    Supine-Sit [place] (Bed Mobility)  contact guard;minimum assist (75% patient effort);verbal cues  -EF      Sit-Supine [place] (Bed Mobility)  contact guard;minimum assist (75% patient effort);verbal cues  -EF      Recorded by [EF] Kirstin Reynoso, PT 20 1430      Row Name 20 1427             Transfer Assessment/Treatment    Transfer Assessment/Treatment  sit-stand transfer;stand-sit transfer  -EF      Recorded by [EF] Kirstin Reynoso, PT 20 1430      Row Name 20 1427             Sit-Stand Transfer    Sit-Stand [place] (Transfers)  contact guard  -EF       Assistive Device (Sit-Stand Transfers)  walker, standard  -EF      Recorded by [EF] Kirstin Reynoso, PT 02/19/20 1430      Row Name 02/19/20 1427             Stand-Sit Transfer    Stand-Sit [place] (Transfers)  contact guard  -EF      Assistive Device (Stand-Sit Transfers)  walker, standard  -EF      Recorded by [EF] Kirstin Reynoso, PT 02/19/20 1430      Row Name 02/19/20 1427             Gait/Stairs Assessment/Training    [place] Level (Gait)  contact guard  -EF      Assistive Device (Gait)  walker, standard  -EF      Distance in Feet (Gait)  80  -EF      Deviations/Abnormal Patterns (Gait)  juan decreased puts front legs of wx on floor first & then back legs  -EF      Recorded by [EF] Kirstin Reynoso, PT 02/19/20 1430      Row Name 02/19/20 1427             Therapeutic Exercise    Comment (Therapeutic Exercise)  R ADALBERTO protocol x 15 reps  -EF      Recorded by [EF] Kirstin Reynoso, PT 02/19/20 1430      Row Name 02/19/20 1427             Positioning and Restraints    Pre-Treatment Position  in bed  -EF      Post Treatment Position  bed  -EF      In Bed  notified nsg;fowlers;call light within reach;encouraged to call for assist;with family/caregiver  -EF      Recorded by [EF] Kirstin Reynoso, PT 02/19/20 1430      Row Name 02/19/20 1427             Pain Assessment    Additional Documentation  Pain Scale: Word Pre/Post-Treatment (Group)  -EF      Recorded by [EF] Kirstin Reynoso, PT 02/19/20 1430      Row Name 02/19/20 1427             Pain Scale: Numbers Pre/Post-Treatment    Pain Location - Side  Right  -EF      Pain Location  hip  -EF      Pain Intervention(s)  Repositioned;Ambulation/increased activity  -EF      Recorded by [EF] Kirstin Reynoso, PT 02/19/20 1430      Row Name 02/19/20 1427             Pain Scale: Word Pre/Post-Treatment    Pain: Word Scale, Pretreatment  2 - mild pain  -EF      Pain: Word Scale, Post-Treatment  2 - mild pain  -EF      Recorded by [EF] [place]  Kirstin COWAN, PT 02/19/20 1430      Row Name                Wound 02/17/20 2000 Right anterior greater trochanter Incision    Wound - Properties Group Date first assessed: 02/17/20 [JS] Time first assessed: 2000 [JS] Side: Right [JS] Orientation: anterior [JS] Location: greater trochanter [JS] Primary Wound Type: Incision [JS] Recorded by:  [GRETCHEN] Aki Kelley RN 02/18/20 0531      User Key  (r) = Recorded By, (t) = Taken By, (c) = Cosigned By    Initials Name Effective Dates Discipline    EF Kirstin Reynoso, PT 06/08/18 -  PT    Aki Yost RN 02/06/17 -  Nurse          Wound 02/17/20 2000 Right anterior greater trochanter Incision (Active)   Dressing Appearance no drainage 2/19/2020  2:07 PM   Closure LONNIE 2/19/2020  2:07 PM   Base dressing in place, unable to visualize 2/19/2020  2:07 PM   Periwound dry;swelling 2/19/2020 12:00 AM   Periwound Temperature warm 2/19/2020 12:00 AM   Periwound Skin Turgor firm 2/19/2020 12:00 AM   Edges irregular 2/19/2020 12:00 AM   Drainage Amount none 2/19/2020  2:07 PM               PT Recommendation and Plan     Plan of Care Reviewed With: patient, family  Outcome Measures     Row Name 02/19/20 1400             How much help from another person do you currently need...    Turning from your back to your side while in flat bed without using bedrails?  3  -EF      Moving from lying on back to sitting on the side of a flat bed without bedrails?  3  -EF      Moving to and from a bed to a chair (including a wheelchair)?  3  -EF      Standing up from a chair using your arms (e.g., wheelchair, bedside chair)?  3  -EF      Climbing 3-5 steps with a railing?  3  -EF      To walk in hospital room?  3  -EF      AM-PAC 6 Clicks Score (PT)  18  -EF         Functional Assessment    Outcome Measure Options  AM-PAC 6 Clicks Basic Mobility (PT)  -EF        User Key  (r) = Recorded By, (t) = Taken By, (c) = Cosigned By    Initials Name Provider Type    EF Kirstin Reynoso, PT Physical  Therapist         Time Calculation:   PT Charges     Row Name 02/19/20 1434             Time Calculation    Start Time  1336  -EF      Stop Time  1350  -EF      Time Calculation (min)  14 min  -EF      PT Received On  02/19/20  -EF      PT - Next Appointment  02/20/20  -EF         Time Calculation- PT    Total Timed Code Minutes- PT  14 minute(s)  -EF        User Key  (r) = Recorded By, (t) = Taken By, (c) = Cosigned By    Initials Name Provider Type    EF Kirstin Reynoso PT Physical Therapist        Therapy Charges for Today     Code Description Service Date Service Provider Modifiers Qty    25497194207 HC PT THER PROC EA 15 MIN 2/19/2020 Kirstin Reynoso PT GP 1          PT G-Codes  Outcome Measure Options: AM-PAC 6 Clicks Basic Mobility (PT)  AM-PAC 6 Clicks Score (PT): 18    Kirstin Reynoso, PT  2/19/2020

## 2020-02-19 NOTE — PLAN OF CARE
Problem: Patient Care Overview  Goal: Plan of Care Review  Flowsheets (Taken 2/19/2020 1431)  Plan of Care Reviewed With: patient; family  Note:   Pt with multiple complaints about heart monitor, call button, bed linens, etc. but was able to ambulate 80' with wx before fatiguing. RN aware.

## 2020-02-19 NOTE — PLAN OF CARE
Problem: Patient Care Overview  Goal: Plan of Care Review  Outcome: Ongoing (interventions implemented as appropriate)  Flowsheets (Taken 2/19/2020 1413)  Outcome Summary: Pt is alert and oriented.  No confusion this shift so far.  Does not c/o pain.  Meds given for constipation and pt has good BM.  Straight cath at shift change, still due to void.  VSS.  1 unit PRBC to be given when ready.  Will continue to monitor.  Goal: Individualization and Mutuality  Outcome: Ongoing (interventions implemented as appropriate)  Goal: Discharge Needs Assessment  Outcome: Ongoing (interventions implemented as appropriate)  Goal: Interprofessional Rounds/Family Conf  Outcome: Ongoing (interventions implemented as appropriate)     Problem: Fall Risk (Adult)  Goal: Identify Related Risk Factors and Signs and Symptoms  Outcome: Ongoing (interventions implemented as appropriate)  Goal: Absence of Fall  Outcome: Ongoing (interventions implemented as appropriate)     Problem: Pneumonia (Adult)  Goal: Signs and Symptoms of Listed Potential Problems Will be Absent, Minimized or Managed (Pneumonia)  Outcome: Ongoing (interventions implemented as appropriate)     Problem: Wound (Includes Pressure Injury) (Adult)  Goal: Signs and Symptoms of Listed Potential Problems Will be Absent, Minimized or Managed (Wound)  Outcome: Ongoing (interventions implemented as appropriate)

## 2020-02-20 ENCOUNTER — APPOINTMENT (OUTPATIENT)
Dept: CARDIOLOGY | Facility: HOSPITAL | Age: 70
End: 2020-02-20

## 2020-02-20 VITALS
DIASTOLIC BLOOD PRESSURE: 55 MMHG | HEART RATE: 96 BPM | OXYGEN SATURATION: 87 % | TEMPERATURE: 98.3 F | HEIGHT: 64 IN | WEIGHT: 215 LBS | SYSTOLIC BLOOD PRESSURE: 123 MMHG | RESPIRATION RATE: 16 BRPM | BODY MASS INDEX: 36.7 KG/M2

## 2020-02-20 LAB
ANION GAP SERPL CALCULATED.3IONS-SCNC: 11.6 MMOL/L (ref 5–15)
BASOPHILS # BLD AUTO: 0.05 10*3/MM3 (ref 0–0.2)
BASOPHILS NFR BLD AUTO: 0.4 % (ref 0–1.5)
BH CV ECHO MEAS - AO MAX PG: 12 MMHG
BH CV ECHO MEAS - AO MEAN PG (FULL): 3 MMHG
BH CV ECHO MEAS - AO MEAN PG: 6 MMHG
BH CV ECHO MEAS - AO V2 MAX: 172 CM/SEC
BH CV ECHO MEAS - AO V2 MEAN: 120 CM/SEC
BH CV ECHO MEAS - AO V2 VTI: 34.2 CM
BH CV ECHO MEAS - AVA PLANIMETRY TRACED: 0.7 CM2
BH CV ECHO MEAS - AVA(I,A): 2.8 CM^2
BH CV ECHO MEAS - AVA(I,D): 2.8 CM^2
BH CV ECHO MEAS - BSA(HAYCOCK): 2.1 M^2
BH CV ECHO MEAS - BSA: 2 M^2
BH CV ECHO MEAS - BZI_BMI: 36.9 KILOGRAMS/M^2
BH CV ECHO MEAS - BZI_METRIC_HEIGHT: 162.6 CM
BH CV ECHO MEAS - BZI_METRIC_WEIGHT: 97.5 KG
BH CV ECHO MEAS - EDV(CUBED): 50.7 ML
BH CV ECHO MEAS - EDV(MOD-SP2): 101 ML
BH CV ECHO MEAS - EDV(MOD-SP4): 81 ML
BH CV ECHO MEAS - EDV(TEICH): 58.1 ML
BH CV ECHO MEAS - EF(CUBED): 56.7 %
BH CV ECHO MEAS - EF(MOD-BP): 70 %
BH CV ECHO MEAS - EF(MOD-SP2): 75.2 %
BH CV ECHO MEAS - EF(MOD-SP4): 63 %
BH CV ECHO MEAS - EF(TEICH): 49.2 %
BH CV ECHO MEAS - ESV(CUBED): 22 ML
BH CV ECHO MEAS - ESV(MOD-SP2): 25 ML
BH CV ECHO MEAS - ESV(MOD-SP4): 30 ML
BH CV ECHO MEAS - ESV(TEICH): 29.6 ML
BH CV ECHO MEAS - FS: 24.3 %
BH CV ECHO MEAS - IVS/LVPW: 1
BH CV ECHO MEAS - IVSD: 1.2 CM
BH CV ECHO MEAS - LAT PEAK E' VEL: 14 CM/SEC
BH CV ECHO MEAS - LV DIASTOLIC VOL/BSA (35-75): 40.2 ML/M^2
BH CV ECHO MEAS - LV MASS(C)D: 147.3 GRAMS
BH CV ECHO MEAS - LV MASS(C)DI: 73 GRAMS/M^2
BH CV ECHO MEAS - LV MEAN PG: 3 MMHG
BH CV ECHO MEAS - LV SYSTOLIC VOL/BSA (12-30): 14.9 ML/M^2
BH CV ECHO MEAS - LV V1 MAX: 111 CM/SEC
BH CV ECHO MEAS - LV V1 MEAN: 78.1 CM/SEC
BH CV ECHO MEAS - LV V1 VTI: 22.8 CM
BH CV ECHO MEAS - LVIDD: 3.7 CM
BH CV ECHO MEAS - LVIDS: 2.8 CM
BH CV ECHO MEAS - LVLD AP2: 7.9 CM
BH CV ECHO MEAS - LVLD AP4: 7.6 CM
BH CV ECHO MEAS - LVLS AP2: 7.1 CM
BH CV ECHO MEAS - LVLS AP4: 6.8 CM
BH CV ECHO MEAS - LVOT AREA (M): 4.2 CM^2
BH CV ECHO MEAS - LVOT AREA: 4.2 CM^2
BH CV ECHO MEAS - LVOT DIAM: 2.3 CM
BH CV ECHO MEAS - LVPWD: 1.2 CM
BH CV ECHO MEAS - MED PEAK E' VEL: 11.2 CM/SEC
BH CV ECHO MEAS - MV A DUR: 0.14 SEC
BH CV ECHO MEAS - MV A MAX VEL: 93.3 CM/SEC
BH CV ECHO MEAS - MV DEC SLOPE: 574 CM/SEC^2
BH CV ECHO MEAS - MV DEC TIME: 0.22 SEC
BH CV ECHO MEAS - MV E MAX VEL: 109 CM/SEC
BH CV ECHO MEAS - MV E/A: 1.2
BH CV ECHO MEAS - MV MEAN PG: 3 MMHG
BH CV ECHO MEAS - MV P1/2T MAX VEL: 119 CM/SEC
BH CV ECHO MEAS - MV P1/2T: 60.7 MSEC
BH CV ECHO MEAS - MV V2 MEAN: 83.9 CM/SEC
BH CV ECHO MEAS - MV V2 VTI: 24.7 CM
BH CV ECHO MEAS - MVA P1/2T LCG: 1.8 CM^2
BH CV ECHO MEAS - MVA(P1/2T): 3.6 CM^2
BH CV ECHO MEAS - MVA(VTI): 3.8 CM^2
BH CV ECHO MEAS - PA ACC SLOPE: 760 CM/SEC^2
BH CV ECHO MEAS - PA ACC TIME: 0.13 SEC
BH CV ECHO MEAS - PA MAX PG (FULL): 1.6 MMHG
BH CV ECHO MEAS - PA MAX PG: 4.1 MMHG
BH CV ECHO MEAS - PA PR(ACCEL): 20.5 MMHG
BH CV ECHO MEAS - PA V2 MAX: 101 CM/SEC
BH CV ECHO MEAS - PULM A REVS DUR: 0.15 SEC
BH CV ECHO MEAS - PULM A REVS VEL: 35.9 CM/SEC
BH CV ECHO MEAS - PULM DIAS VEL: 32 CM/SEC
BH CV ECHO MEAS - PULM S/D: 1.6
BH CV ECHO MEAS - PULM SYS VEL: 51.1 CM/SEC
BH CV ECHO MEAS - PVA(V,A): 2.2 CM^2
BH CV ECHO MEAS - PVA(V,D): 2.2 CM^2
BH CV ECHO MEAS - QP/QS: 0.45
BH CV ECHO MEAS - RAP SYSTOLE: 3 MMHG
BH CV ECHO MEAS - RV MAX PG: 2.4 MMHG
BH CV ECHO MEAS - RV MEAN PG: 1 MMHG
BH CV ECHO MEAS - RV V1 MAX: 78.2 CM/SEC
BH CV ECHO MEAS - RV V1 MEAN: 51.7 CM/SEC
BH CV ECHO MEAS - RV V1 VTI: 15.2 CM
BH CV ECHO MEAS - RVOT AREA: 2.8 CM^2
BH CV ECHO MEAS - RVOT DIAM: 1.9 CM
BH CV ECHO MEAS - RVSP: 31.9 MMHG
BH CV ECHO MEAS - SI(CUBED): 14.2 ML/M^2
BH CV ECHO MEAS - SI(LVOT): 47 ML/M^2
BH CV ECHO MEAS - SI(MOD-SP2): 37.7 ML/M^2
BH CV ECHO MEAS - SI(MOD-SP4): 25.3 ML/M^2
BH CV ECHO MEAS - SI(TEICH): 14.2 ML/M^2
BH CV ECHO MEAS - SV(CUBED): 28.7 ML
BH CV ECHO MEAS - SV(LVOT): 94.7 ML
BH CV ECHO MEAS - SV(MOD-SP2): 76 ML
BH CV ECHO MEAS - SV(MOD-SP4): 51 ML
BH CV ECHO MEAS - SV(RVOT): 43.1 ML
BH CV ECHO MEAS - SV(TEICH): 28.6 ML
BH CV ECHO MEAS - TAPSE (>1.6): 2.7 CM2
BH CV ECHO MEAS - TR MAX VEL: 269 CM/SEC
BH CV ECHO MEASUREMENTS AVERAGE E/E' RATIO: 8.65
BH CV XLRA - RV BASE: 3.1 CM
BH CV XLRA - RV LENGTH: 6.9 CM
BH CV XLRA - RV MID: 2.2 CM
BH CV XLRA - TDI S': 17.7 CM/SEC
BUN BLD-MCNC: 6 MG/DL (ref 8–23)
BUN/CREAT SERPL: 8.1 (ref 7–25)
CALCIUM SPEC-SCNC: 8.6 MG/DL (ref 8.6–10.5)
CHLORIDE SERPL-SCNC: 99 MMOL/L (ref 98–107)
CO2 SERPL-SCNC: 24.4 MMOL/L (ref 22–29)
CREAT BLD-MCNC: 0.74 MG/DL (ref 0.57–1)
DEPRECATED RDW RBC AUTO: 43.5 FL (ref 37–54)
EOSINOPHIL # BLD AUTO: 0.43 10*3/MM3 (ref 0–0.4)
EOSINOPHIL NFR BLD AUTO: 3.4 % (ref 0.3–6.2)
ERYTHROCYTE [DISTWIDTH] IN BLOOD BY AUTOMATED COUNT: 14.1 % (ref 12.3–15.4)
GFR SERPL CREATININE-BSD FRML MDRD: 77 ML/MIN/1.73
GLUCOSE BLD-MCNC: 114 MG/DL (ref 65–99)
HCT VFR BLD AUTO: 23.3 % (ref 34–46.6)
HGB BLD-MCNC: 7.7 G/DL (ref 12–15.9)
IMM GRANULOCYTES # BLD AUTO: 0.29 10*3/MM3 (ref 0–0.05)
IMM GRANULOCYTES NFR BLD AUTO: 2.3 % (ref 0–0.5)
LEFT ATRIUM VOLUME INDEX: 23 ML/M2
LV EF 2D ECHO EST: 70 %
LYMPHOCYTES # BLD AUTO: 1.84 10*3/MM3 (ref 0.7–3.1)
LYMPHOCYTES NFR BLD AUTO: 14.6 % (ref 19.6–45.3)
MAXIMAL PREDICTED HEART RATE: 149 BPM
MCH RBC QN AUTO: 28.2 PG (ref 26.6–33)
MCHC RBC AUTO-ENTMCNC: 33 G/DL (ref 31.5–35.7)
MCV RBC AUTO: 85.3 FL (ref 79–97)
MONOCYTES # BLD AUTO: 0.78 10*3/MM3 (ref 0.1–0.9)
MONOCYTES NFR BLD AUTO: 6.2 % (ref 5–12)
NEUTROPHILS # BLD AUTO: 9.18 10*3/MM3 (ref 1.7–7)
NEUTROPHILS NFR BLD AUTO: 73.1 % (ref 42.7–76)
NRBC BLD AUTO-RTO: 0 /100 WBC (ref 0–0.2)
PLATELET # BLD AUTO: 483 10*3/MM3 (ref 140–450)
PMV BLD AUTO: 9.2 FL (ref 6–12)
POTASSIUM BLD-SCNC: 3.2 MMOL/L (ref 3.5–5.2)
RBC # BLD AUTO: 2.73 10*6/MM3 (ref 3.77–5.28)
SODIUM BLD-SCNC: 135 MMOL/L (ref 136–145)
STRESS TARGET HR: 127 BPM
URATE SERPL-MCNC: 3.9 MG/DL (ref 2.4–5.7)
WBC NRBC COR # BLD: 12.57 10*3/MM3 (ref 3.4–10.8)

## 2020-02-20 PROCEDURE — 80048 BASIC METABOLIC PNL TOTAL CA: CPT | Performed by: NURSE PRACTITIONER

## 2020-02-20 PROCEDURE — 94799 UNLISTED PULMONARY SVC/PX: CPT

## 2020-02-20 PROCEDURE — 25010000002 CEFTRIAXONE PER 250 MG: Performed by: NURSE PRACTITIONER

## 2020-02-20 PROCEDURE — 25010000002 FUROSEMIDE PER 20 MG: Performed by: INTERNAL MEDICINE

## 2020-02-20 PROCEDURE — 93306 TTE W/DOPPLER COMPLETE: CPT | Performed by: INTERNAL MEDICINE

## 2020-02-20 PROCEDURE — 93306 TTE W/DOPPLER COMPLETE: CPT

## 2020-02-20 PROCEDURE — 85025 COMPLETE CBC W/AUTO DIFF WBC: CPT | Performed by: NURSE PRACTITIONER

## 2020-02-20 PROCEDURE — 84550 ASSAY OF BLOOD/URIC ACID: CPT | Performed by: INTERNAL MEDICINE

## 2020-02-20 PROCEDURE — 25010000002 PERFLUTREN (DEFINITY) 8.476 MG IN SODIUM CHLORIDE (PF) 0.9 % 10 ML INJECTION: Performed by: INTERNAL MEDICINE

## 2020-02-20 RX ORDER — POTASSIUM CHLORIDE 750 MG/1
40 CAPSULE, EXTENDED RELEASE ORAL ONCE
Status: COMPLETED | OUTPATIENT
Start: 2020-02-20 | End: 2020-02-20

## 2020-02-20 RX ORDER — POTASSIUM CHLORIDE 1.5 G/1.77G
40 POWDER, FOR SOLUTION ORAL AS NEEDED
Status: DISCONTINUED | OUTPATIENT
Start: 2020-02-20 | End: 2020-02-20 | Stop reason: HOSPADM

## 2020-02-20 RX ORDER — POTASSIUM CHLORIDE 750 MG/1
40 CAPSULE, EXTENDED RELEASE ORAL AS NEEDED
Status: DISCONTINUED | OUTPATIENT
Start: 2020-02-20 | End: 2020-02-20 | Stop reason: HOSPADM

## 2020-02-20 RX ORDER — POTASSIUM CHLORIDE 7.45 MG/ML
10 INJECTION INTRAVENOUS
Status: DISCONTINUED | OUTPATIENT
Start: 2020-02-20 | End: 2020-02-20 | Stop reason: HOSPADM

## 2020-02-20 RX ORDER — CEFDINIR 300 MG/1
300 CAPSULE ORAL 2 TIMES DAILY
Qty: 12 CAPSULE | Refills: 0 | Status: SHIPPED | OUTPATIENT
Start: 2020-02-21 | End: 2020-03-01 | Stop reason: HOSPADM

## 2020-02-20 RX ADMIN — BUPROPION HYDROCHLORIDE 150 MG: 150 TABLET, EXTENDED RELEASE ORAL at 10:51

## 2020-02-20 RX ADMIN — GUAIFENESIN 1200 MG: 600 TABLET, EXTENDED RELEASE ORAL at 10:51

## 2020-02-20 RX ADMIN — POTASSIUM CHLORIDE 40 MEQ: 750 CAPSULE, EXTENDED RELEASE ORAL at 10:50

## 2020-02-20 RX ADMIN — IPRATROPIUM BROMIDE AND ALBUTEROL SULFATE 3 ML: 2.5; .5 SOLUTION RESPIRATORY (INHALATION) at 06:24

## 2020-02-20 RX ADMIN — FUROSEMIDE 40 MG: 10 INJECTION, SOLUTION INTRAMUSCULAR; INTRAVENOUS at 05:13

## 2020-02-20 RX ADMIN — POTASSIUM CHLORIDE 40 MEQ: 750 CAPSULE, EXTENDED RELEASE ORAL at 14:08

## 2020-02-20 RX ADMIN — CEFTRIAXONE SODIUM 1 G: 1 INJECTION, SOLUTION INTRAVENOUS at 12:29

## 2020-02-20 RX ADMIN — PERFLUTREN 2 ML: 6.52 INJECTION, SUSPENSION INTRAVENOUS at 11:00

## 2020-02-20 RX ADMIN — DOCUSATE SODIUM 100 MG: 100 CAPSULE, LIQUID FILLED ORAL at 10:51

## 2020-02-20 RX ADMIN — SODIUM CHLORIDE, PRESERVATIVE FREE 10 ML: 5 INJECTION INTRAVENOUS at 10:51

## 2020-02-20 RX ADMIN — SODIUM CHLORIDE, PRESERVATIVE FREE 10 ML: 5 INJECTION INTRAVENOUS at 10:54

## 2020-02-20 RX ADMIN — DULOXETINE HYDROCHLORIDE 60 MG: 60 CAPSULE, DELAYED RELEASE ORAL at 06:46

## 2020-02-20 RX ADMIN — VILAZODONE HYDROCHLORIDE 40 MG: 40 TABLET ORAL at 06:46

## 2020-02-20 RX ADMIN — IPRATROPIUM BROMIDE AND ALBUTEROL SULFATE 3 ML: 2.5; .5 SOLUTION RESPIRATORY (INHALATION) at 10:59

## 2020-02-20 NOTE — PROGRESS NOTES
Name: Ellen Adamson ADMIT: 2020   : 1950  PCP: Grant Layne MD    MRN: [id number] LOS: 3 days   AGE/SEX: 69 y.o. female  ROOM: [facility]     Subjective   Subjective   no new complaints or events overnight.    Review of Systems   Constitutional: Negative for chills and fever.   Respiratory: Negative for cough and shortness of breath.    Cardiovascular: Negative for chest pain and palpitations.   Gastrointestinal: Positive for constipation. Negative for nausea and vomiting.   Genitourinary: Positive for difficulty urinating. Negative for dysuria.   Musculoskeletal: Positive for gait problem.   Skin: Positive for wound (s/p right hip replacement, wound dressing CDI).        Objective   Objective   Vital Signs  Temp:  [97.9 °F (36.6 °C)-98.8 °F (37.1 °C)] 97.9 °F (36.6 °C)  Heart Rate:  [] 95  Resp:  [16-20] 16  BP: (115-127)/(43-60) 127/60  SpO2:  [88 %-96 %] 88 %  on  Flow (L/min):  [2] 2;   Device (Oxygen Therapy): room air  Body mass index is 37.01 kg/m².  Physical Exam   Constitutional: She is oriented to person, place, and time. She appears well-developed and well-nourished. No distress.   Cardiovascular: Normal rate and regular rhythm.   Pulmonary/Chest: Effort normal. No respiratory distress. She has decreased breath sounds. She has rales.   Abdominal: Soft. Bowel sounds are normal. She exhibits no distension. There is no tenderness.   Musculoskeletal: She exhibits edema (trace ble).   limited r hip s/p surgery   Neurological: She is alert and oriented to person, place, and time.   Psychiatric: She has a normal mood and affect. Her behavior is normal.   Nursing note and vitals reviewed.      Results Review:       I reviewed the patient's new clinical results.  Results from last 7 days   Lab Units 20  0517 20  0528 20  1448 20  0748  20  0933   WBC 10*3/mm3 12.57* 14.06*  --  14.23*  --  16.75*   HEMOGLOBIN g/dL 7.7* 7.4* 7.5* 7.5*   < > 8.6*   PLATELETS  10*3/mm3 483* 444  --  397  --  414    < > = values in this interval not displayed.     Results from last 7 days   Lab Units 02/20/20  0517 02/19/20  0528 02/18/20  0748 02/17/20  0933   SODIUM mmol/L 135* 137 139 139   POTASSIUM mmol/L 3.2* 3.6 3.9 3.9   CHLORIDE mmol/L 99 101 103 101   CO2 mmol/L 24.4 24.7 26.4 25.6   BUN mg/dL 6* 7* 9 10   CREATININE mg/dL 0.74 0.79 0.80 0.85   GLUCOSE mg/dL 114* 112* 108* 118*   Estimated Creatinine Clearance: 73.2 mL/min (by C-G formula based on SCr of 0.74 mg/dL).  Results from last 7 days   Lab Units 02/17/20  0933   ALBUMIN g/dL 2.60*   BILIRUBIN mg/dL 0.3   ALK PHOS U/L 126*   AST (SGOT) U/L 23   ALT (SGPT) U/L 13     Results from last 7 days   Lab Units 02/20/20  0517 02/19/20  0528 02/18/20  0748 02/17/20  0933   CALCIUM mg/dL 8.6 8.4* 8.2* 8.6   ALBUMIN g/dL  --   --   --  2.60*     Results from last 7 days   Lab Units 02/17/20  1228   LACTATE mmol/L 0.8     No results found for: HGBA1C, POCGLU      bisacodyl 10 mg Rectal Daily   buPROPion  mg Oral BID   cefTRIAXone 1 g Intravenous Q24H   docusate sodium 100 mg Oral BID   DULoxetine 60 mg Oral QAM   enoxaparin 40 mg Subcutaneous Q24H   furosemide 40 mg Intravenous Q12H   guaiFENesin 1,200 mg Oral Q12H   ipratropium-albuterol 3 mL Nebulization 4x Daily - RT   melatonin 5 mg Oral Nightly   polyethylene glycol 17 g Oral Daily   sodium chloride 10 mL Intravenous Q12H   sodium chloride 10 mL Intravenous Q12H   vilazodone 40 mg Oral QAM      Diet Regular       Assessment/Plan     Active Hospital Problems    Diagnosis  POA   • **Pneumonia of both lungs due to infectious organism [J18.9]  Yes   • Acute urinary retention [R33.8]  Unknown   • Asthma [J45.909]  Yes   • COPD with hypoxia (CMS/[facility]) [J44.9, R09.02]  Yes   • GERD without esophagitis [K21.9]  Yes   • CKD (chronic kidney disease) stage 3, GFR 30-59 ml/min (CMS/[facility]) [N18.3]  Unknown   • Leukocytosis [D72.829]  Unknown      Resolved Hospital Problems   No resolved  problems to display.     PNA/Hypoxia  - continue ceftriaxone, will change to PO omnicef at time of DC  - RVP negative, blood cultures NGTD  - CTA and doppler negative for clots   - follow-up CT of the chest in 8 to 12 weeks to make sure reactive lymph nodes have resolved  - continue supportive care and wean o2 as tolerated   - white count trending down   - repeat CXR noted     Fluid volume overload  - receiving lasix with good response. has had over 2L of fluid off.  - echo pending     Anemia  - denies dizziness, lightheadedness and overt signs of bleeding. stable. monitor.    Hypokalemia   - give 40meq klor con now and then again in 4 hours. initiate protocol.     CKD3  - stable     Acute urinary retention  - PVRs, if bladder scan greater than 450mL I&O cath  - consider urology consult if does not resolve   - UA negative     Constipation   - initiate bowel regimen with mirlax, colace and suppository (PRN)    Confusion  - she seems to be alert during the day and having some sort of delirium/sundowning at night, which should improve once she is back home. could possibly also be due to constipation and urine retention. Norco and Xanax have been discontinued. will will watch her one more night.    · Pharmacy to dose Lovenox for DVT prophylaxis.  · Full code.  · Discussed with patient and nursing staff.  · Anticipate discharge home tomorrow.      DONA Vu  [facility]  02/20/20  8:41 AM

## 2020-02-20 NOTE — DISCHARGE PLACEMENT REQUEST
"Ghassan James (69 y.o. Female)     Date of Birth Social Security Number Address Home Phone MRN    1950  [facility] [id number] [phone] [id number]    Gnosticist Marital Status          Yazdanism        Admission Date Admission Type Admitting Provider Attending Provider Department, Room/Bed    2/17/20 Emergency Dario Henry MD  [address], [address]/1    Discharge Date Discharge Disposition Discharge Destination        2/20/2020 Home or Self Care              Attending Provider:  (none)   Allergies:  Iodine, Shellfish-derived Products    Isolation:  None   Infection:  None   Code Status:  CPR    Ht:  162.6 cm (64\")   Wt:  97.5 kg (215 lb)    Admission Cmt:  None   Principal Problem:  Pneumonia of both lungs due to infectious organism [J18.9]                 Active Insurance as of 2/17/2020     Primary Coverage     Payor Plan Insurance Group Employer/Plan Group    MEDICARE MEDICARE A & B      Payor Plan Address Payor Plan Phone Number Payor Plan Fax Number Effective Dates    [address] [phone]  11/1/2013 - None Entered    [facility] [id number]       Subscriber Name Subscriber Birth Date Member ID       GHASSAN JAMES 1950 [id number]           Secondary Coverage     Payor Plan Insurance Group Employer/Plan Group    HUMANA HUMANA  SUP              [id number]     Payor Plan Address Payor Plan Phone Number Payor Plan Fax Number Effective Dates    [address] [address]   8/1/2018 - None Entered    [facility] [id number]       Subscriber Name Subscriber Birth Date Member ID       GHASSAN JAMES 1950 [id number]                 Emergency Contacts      (Rel.) Home Phone Work Phone Mobile Phone    Ramírez James (Spouse) [phone] -- [phone]              "

## 2020-02-20 NOTE — PLAN OF CARE
Pt awake til 3am, watching tv, denies discomfort, nausea or sob, taken to br, walked without difficulty, unable to void, denies pressure to pelvic area. Lungs diminished, cough infrequent, nonproductive, afebrile.  at bedside. Repeatedly removes 02 sensor even explanation for its purpose. O2 2L  utilized r/t sats in 80's while asleep.   Problem: Wound (Includes Pressure Injury) (Adult)  Goal: Signs and Symptoms of Listed Potential Problems Will be Absent, Minimized or Managed (Wound)  Outcome: Ongoing (interventions implemented as appropriate)     Problem: Pneumonia (Adult)  Goal: Signs and Symptoms of Listed Potential Problems Will be Absent, Minimized or Managed (Pneumonia)  Outcome: Ongoing (interventions implemented as appropriate)     Problem: Fall Risk (Adult)  Goal: Identify Related Risk Factors and Signs and Symptoms  Outcome: Ongoing (interventions implemented as appropriate)     Problem: Patient Care Overview  Goal: Plan of Care Review  Outcome: Ongoing (interventions implemented as appropriate)

## 2020-02-20 NOTE — DISCHARGE SUMMARY
Date of Admission: 2/17/2020  Date of Discharge:  2/20/2020  Primary Care Physician: Grant Layne MD     Discharge Diagnosis:  Active Hospital Problems    Diagnosis  POA   • **Pneumonia of both lungs due to infectious organism [J18.9]  Yes   • Acute urinary retention [R33.8]  Unknown   • Asthma [J45.909]  Yes   • COPD with hypoxia (CMS/[facility]) [J44.9, R09.02]  Yes   • GERD without esophagitis [K21.9]  Yes   • CKD (chronic kidney disease) stage 3, GFR 30-59 ml/min (CMS/HCC) [N18.3]  Unknown   • Leukocytosis [D72.829]  Unknown      Resolved Hospital Problems   No resolved problems to display.       DETAILS OF HOSPITAL STAY     Pertinent Test Results and Procedures Performed    CT angiogram of the chest:  1. No convincing evidence of pulmonary artery thromboembolism.  2. CT findings suggestive of multifocal pneumonia. Multiple enlarged  mediastinal and bilateral hilar lymph nodes are favored to be reactive.  Follow-up CT chest is recommended in 8-12 weeks following treatment to  ensure resolution.    Bilateral lower extremity Dopplers negative for DVT    Chest x-ray:  There are patchy areas of airspace infiltrate in both lower lobes that  are new since the preceding chest x-ray, but were present on the  preceding chest CT dated 02/17/2020. These appear essentially unchanged.  There are no pleural effusions. The heart is normal in size and  unchanged. There is slight pulmonary vascular engorgement, but no flor  pulmonary edema.    Respiratory viral panel, blood cultures negative    2D echocardiogram:  · Left ventricular wall thickness is consistent with mild concentric hypertrophy.  · Estimated EF = 70%.  · Left ventricular systolic function is normal.    Hospital course  This is a 69-year-old female who had recently undergone right hip replacement and presented postoperatively to the emergency room with shortness of breath.  Please see H&P for full details of admission.  She had a CT angiogram of the chest that  showed multifocal pneumonia and reactive lymph nodes as described above.  She was placed on Rocephin and azithromycin.  Other infectious work-up was unremarkable.  Over the past 3 days the patient has had gradual improvement of her symptoms.  She did develop some fluid overload with edema and slight pulmonary vascular congestion.  She received 2 doses of IV Lasix yesterday and is feeling much better and we were able to wean her to room air oxygen.  She was having dyspnea on exertion upon presentation but this is now resolved as well.  She also had a brief period of urinary retention requiring a couple straight bladder catheterizations but this has also resolved and she is urinating frequently at this point.  She was evaluated by her orthopedic surgeon who felt that her incision was stable.  She has experienced some postoperative blood loss anemia with hemoglobin as low as 7.4 but this is now starting to rise and she did not require any transfusion.  At this point she is medically stable and will be released home with close outpatient follow-up.  Of note, the patient will require a repeat CT of the chest in 8 to 12 weeks to ensure that her reactive lymphadenopathy has resolved.  This has been discussed in detail with the patient and her  at bedside today and they are both in agreement with this plan and very much wanting to be discharged today.        Physical Exam at Discharge:  General: No acute distress, AAOx3  HEENT: EOMI, PERRL  Cardiovascular: +s1 and s2, RRR  Lungs: No rhonchi or wheezing, no respiratory distress or increased work of breathing.  Currently on room air  Abdomen: soft, nontender  Extremities: Trace lower extremity bilaterally, improved from yesterday    Consults:   Consults     Date and Time Order Name Status Description    2/17/2020 1813 Inpatient Orthopedic Surgery Consult      2/17/2020 1214 [facility] (on-call MD unless specified) Details Completed             Condition on Discharge: Stable,  improved    Discharge Disposition  Home or Self Care    Discharge Medications     Discharge Medications      New Medications      Instructions Start Date   cefdinir 300 MG capsule  Commonly known as:  OMNICEF   300 mg, Oral, 2 Times Daily   Start Date:  February 21, 2020        Continue These Medications      Instructions Start Date   ALPRAZolam 1 MG tablet  Commonly known as:  XANAX   1 mg, Oral, 4 Times Daily PRN      buPROPion  MG 12 hr tablet  Commonly known as:  WELLBUTRIN SR   150 mg, Oral, 2 Times Daily      DULoxetine 60 MG capsule  Commonly known as:  CYMBALTA   60 mg, Oral, Every Morning      vilazodone 40 MG tablet tablet  Commonly known as:  VIIBRYD   40 mg, Oral, Every Morning         Stop These Medications    LORCET HD  MG per tablet  Generic drug:  HYDROcodone-acetaminophen            Discharge Diet:   Diet Instructions     Diet: Regular      Discharge Diet:  Regular          Activity at Discharge:   Activity Instructions     Activity as Tolerated            Follow-up Appointments  No future appointments.  Additional Instructions for the Follow-ups that You Need to Schedule     Ambulatory Referral to Home Health   As directed      Face to Face Visit Date:  2/20/2020    Follow-up provider for Plan of Care?:  I treated the patient in an acute care facility and will not continue treatment after discharge.    Follow-up provider:  HOPE WATTERS [[id number]]    Reason/Clinical Findings:  weakness, deconditioning s/p PNA    Describe mobility limitations that make leaving home difficult:  weakness, deconditioning s/p PNA    Nursing/Therapeutic Services Requested:  Skilled Nursing Physical Therapy    Skilled nursing orders:  Medication education    Frequency:  1 Week 1         Discharge Follow-up with PCP   As directed       Currently Documented PCP:    Hope Watters MD    PCP Phone Number:    [phone]     Follow Up Details:  1 week               Test Results Pending at Discharge   Order  Current Status    Blood Culture - Blood, Arm, Right In process    Blood Culture - Blood, Arm, Left Preliminary result          I have examined and discussed discharge planning with the patient today.     Kyle Costa MD  02/20/20  12:55 PM    Time: Discharge greater than 30 min

## 2020-02-20 NOTE — PROGRESS NOTES
Case Management Discharge Note      Final Note: Pt discharged home with JOSIAS Kovacs RN         Destination      No service has been selected for the patient.      Durable Medical Equipment      No service has been selected for the patient.      Dialysis/Infusion      No service has been selected for the patient.      Home Medical Care - Selection Complete      Service Provider Request Status Selected Services Address Phone Number Fax Number    [facility] [address] [place] [address] [phone]      Therapy      No service has been selected for the patient.      Community Resources      No service has been selected for the patient.        Transportation Services  Private: Car    Final Discharge Disposition Code: 06 - home with home health care

## 2020-02-20 NOTE — PROGRESS NOTES
Continued Stay Note  [facility]     Patient Name: Ellen Adamson  MRN: [id number]  Today's Date: 2/20/2020    Admit Date: 2/17/2020    Discharge Plan     Row Name 02/20/20 1212       Plan    Plan  Plan home with JOSIAS Kovacs RN    Patient/Family in Agreement with Plan  yes    Plan Comments  Spoke with pt at bedside.  Pt has discharge order.  Geni  ( [phone]) called and notified of pt's discharge.  Plan home with JOSIAS Kovacs RN        Discharge Codes    No documentation.       Expected Discharge Date and Time     Expected Discharge Date Expected Discharge Time    Feb 20, 2020             Cassie Kovacs RN

## 2020-02-21 ENCOUNTER — READMISSION MANAGEMENT (OUTPATIENT)
Dept: CALL CENTER | Facility: HOSPITAL | Age: 70
End: 2020-02-21

## 2020-02-21 NOTE — OUTREACH NOTE
Prep Survey      Responses   Facility patient discharged from?  [facility]   Is patient eligible?  Yes   Discharge diagnosis  Pneumonia of both lungs due to  infectious organism    Does the patient have one of the following disease processes/diagnoses(primary or secondary)?  COPD/Pneumonia   Does the patient have Home health ordered?  Yes   What is the Home health agency?   [facility]    Is there a DME ordered?  No   Prep survey completed?  Yes          Destiny Lim RN

## 2020-02-22 ENCOUNTER — READMISSION MANAGEMENT (OUTPATIENT)
Dept: CALL CENTER | Facility: HOSPITAL | Age: 70
End: 2020-02-22

## 2020-02-22 LAB — BACTERIA SPEC AEROBE CULT: NORMAL

## 2020-02-22 NOTE — OUTREACH NOTE
COPD/PN Week 1 Survey      Responses   Facility patient discharged from?  [facility]   Does the patient have one of the following disease processes/diagnoses(primary or secondary)?  COPD/Pneumonia   Is there a successful TCM telephone encounter documented?  No   Was the primary reason for admission:  Pneumonia   Week 1 attempt successful?  No   Unsuccessful attempts  Attempt 1          Sumit Ribera, RN

## 2020-02-24 ENCOUNTER — READMISSION MANAGEMENT (OUTPATIENT)
Dept: CALL CENTER | Facility: HOSPITAL | Age: 70
End: 2020-02-24

## 2020-02-24 NOTE — OUTREACH NOTE
COPD/PN Week 1 Survey      Responses   Facility patient discharged from?  [facility]   Does the patient have one of the following disease processes/diagnoses(primary or secondary)?  COPD/Pneumonia   Is there a successful TCM telephone encounter documented?  No   Was the primary reason for admission:  Pneumonia   Week 1 attempt successful?  Yes   Call start time  1031   Call end time  1035   Discharge diagnosis  Pneumonia of both lungs due to  infectious organism    Is patient permission given to speak with other caregiver?  Yes   List who call center can speak with  Ramírez reviewed with patient/caregiver?  Yes   Is the patient having any side effects they believe may be caused by any medication additions or changes?  No   Does the patient have all medications ordered at discharge?  Yes   Is the patient taking all medications as directed (includes completed medication regime)?  Yes   Does the patient have a primary care provider?   Yes   Does the patient have an appointment with their PCP or pulmonologist within 7 days of discharge?  Yes   Has the patient kept scheduled appointments due by today?  N/A   What is the Home health agency?   [facility]    Has home health visited the patient within 72 hours of discharge?  Yes   Home health comments  They want to come late in the day and that is not a good time for her. PT comes at a good time.    Has all DME been delivered?  No   Psychosocial issues?  No   Did the patient receive a copy of their discharge instructions?  Yes   Nursing interventions  Reviewed instructions with patient   What is the patient's perception of their health status since discharge?  Improving   Nursing Interventions  Nurse provided patient education   Are the patient's immunizations up to date?   Yes   Is the patient/caregiver able to teach back the hierarchy of who to call/visit for symptoms/problems? PCP, Specialist, Home health nurse, Urgent Care, ED, 911  Yes   Additional teach back  comments  Nonsmoker, has not smoked in 2 years   Is the patient/caregiver able to teach back signs and symptoms of worsening condition:  Fever/chills, Shortness of breath, Chest pain   Is the patient/caregiver able to teach back importance of completing antibiotic course of treatment?  Yes   Week 1 call completed?  Yes   Wrap up additional comments  Patient is getting  services but states they want to come at 5:30 or later and that is not good for her, needs earlier in the day.           Kailee Wylie, RN

## 2020-02-28 ENCOUNTER — APPOINTMENT (OUTPATIENT)
Dept: CT IMAGING | Facility: HOSPITAL | Age: 70
End: 2020-02-28

## 2020-02-28 ENCOUNTER — APPOINTMENT (OUTPATIENT)
Dept: GENERAL RADIOLOGY | Facility: HOSPITAL | Age: 70
End: 2020-02-28

## 2020-02-28 ENCOUNTER — HOSPITAL ENCOUNTER (INPATIENT)
Facility: HOSPITAL | Age: 70
LOS: 2 days | Discharge: HOME HEALTH | End: 2020-03-01
Attending: HOSPITALIST | Admitting: HOSPITALIST

## 2020-02-28 DIAGNOSIS — J18.9 PNEUMONIA OF BOTH LOWER LOBES DUE TO INFECTIOUS ORGANISM: Primary | ICD-10-CM

## 2020-02-28 PROBLEM — D64.9 ANEMIA: Status: ACTIVE | Noted: 2020-02-28

## 2020-02-28 PROBLEM — D75.839 THROMBOCYTOSIS: Status: ACTIVE | Noted: 2020-02-28

## 2020-02-28 PROBLEM — R06.09 DYSPNEA ON EXERTION: Status: ACTIVE | Noted: 2020-02-28

## 2020-02-28 PROBLEM — M35.00 SJOGREN'S SYNDROME (HCC): Status: ACTIVE | Noted: 2020-02-28

## 2020-02-28 PROBLEM — Z22.322 CARRIER OF METHICILLIN RESISTANT STAPHYLOCOCCUS AUREUS (MRSA): Status: ACTIVE | Noted: 2020-02-28

## 2020-02-28 LAB
ALBUMIN SERPL-MCNC: 3.5 G/DL (ref 3.5–5.2)
ALBUMIN/GLOB SERPL: 0.9 G/DL
ALP SERPL-CCNC: 135 U/L (ref 39–117)
ALT SERPL W P-5'-P-CCNC: 13 U/L (ref 1–33)
ANION GAP SERPL CALCULATED.3IONS-SCNC: 12.9 MMOL/L (ref 5–15)
AST SERPL-CCNC: 14 U/L (ref 1–32)
B PARAPERT DNA SPEC QL NAA+PROBE: NOT DETECTED
B PERT DNA SPEC QL NAA+PROBE: NOT DETECTED
BASOPHILS # BLD AUTO: 0.14 10*3/MM3 (ref 0–0.2)
BASOPHILS NFR BLD AUTO: 1.4 % (ref 0–1.5)
BILIRUB SERPL-MCNC: 0.2 MG/DL (ref 0.2–1.2)
BUN BLD-MCNC: 16 MG/DL (ref 8–23)
BUN/CREAT SERPL: 14.7 (ref 7–25)
C PNEUM DNA NPH QL NAA+NON-PROBE: NOT DETECTED
CALCIUM SPEC-SCNC: 9.7 MG/DL (ref 8.6–10.5)
CHLORIDE SERPL-SCNC: 100 MMOL/L (ref 98–107)
CO2 SERPL-SCNC: 26.1 MMOL/L (ref 22–29)
CREAT BLD-MCNC: 1.09 MG/DL (ref 0.57–1)
D-LACTATE SERPL-SCNC: 1.9 MMOL/L (ref 0.5–2)
DEPRECATED RDW RBC AUTO: 45 FL (ref 37–54)
EOSINOPHIL # BLD AUTO: 0.38 10*3/MM3 (ref 0–0.4)
EOSINOPHIL NFR BLD AUTO: 3.9 % (ref 0.3–6.2)
ERYTHROCYTE [DISTWIDTH] IN BLOOD BY AUTOMATED COUNT: 14.1 % (ref 12.3–15.4)
FLUAV H1 2009 PAND RNA NPH QL NAA+PROBE: NOT DETECTED
FLUAV H1 HA GENE NPH QL NAA+PROBE: NOT DETECTED
FLUAV H3 RNA NPH QL NAA+PROBE: NOT DETECTED
FLUAV SUBTYP SPEC NAA+PROBE: NOT DETECTED
FLUBV RNA ISLT QL NAA+PROBE: NOT DETECTED
GFR SERPL CREATININE-BSD FRML MDRD: 49 ML/MIN/1.73
GLOBULIN UR ELPH-MCNC: 3.8 GM/DL
GLUCOSE BLD-MCNC: 123 MG/DL (ref 65–99)
HADV DNA SPEC NAA+PROBE: NOT DETECTED
HCOV 229E RNA SPEC QL NAA+PROBE: NOT DETECTED
HCOV HKU1 RNA SPEC QL NAA+PROBE: NOT DETECTED
HCOV NL63 RNA SPEC QL NAA+PROBE: NOT DETECTED
HCOV OC43 RNA SPEC QL NAA+PROBE: NOT DETECTED
HCT VFR BLD AUTO: 30.5 % (ref 34–46.6)
HGB BLD-MCNC: 9.9 G/DL (ref 12–15.9)
HMPV RNA NPH QL NAA+NON-PROBE: NOT DETECTED
HPIV1 RNA SPEC QL NAA+PROBE: NOT DETECTED
HPIV2 RNA SPEC QL NAA+PROBE: NOT DETECTED
HPIV3 RNA NPH QL NAA+PROBE: NOT DETECTED
HPIV4 P GENE NPH QL NAA+PROBE: NOT DETECTED
IMM GRANULOCYTES # BLD AUTO: 0.08 10*3/MM3 (ref 0–0.05)
IMM GRANULOCYTES NFR BLD AUTO: 0.8 % (ref 0–0.5)
LYMPHOCYTES # BLD AUTO: 2.13 10*3/MM3 (ref 0.7–3.1)
LYMPHOCYTES NFR BLD AUTO: 22 % (ref 19.6–45.3)
M PNEUMO IGG SER IA-ACNC: NOT DETECTED
MCH RBC QN AUTO: 28.3 PG (ref 26.6–33)
MCHC RBC AUTO-ENTMCNC: 32.5 G/DL (ref 31.5–35.7)
MCV RBC AUTO: 87.1 FL (ref 79–97)
MONOCYTES # BLD AUTO: 0.75 10*3/MM3 (ref 0.1–0.9)
MONOCYTES NFR BLD AUTO: 7.7 % (ref 5–12)
NEUTROPHILS # BLD AUTO: 6.21 10*3/MM3 (ref 1.7–7)
NEUTROPHILS NFR BLD AUTO: 64.2 % (ref 42.7–76)
NRBC BLD AUTO-RTO: 0 /100 WBC (ref 0–0.2)
NT-PROBNP SERPL-MCNC: 223.9 PG/ML (ref 5–900)
PLATELET # BLD AUTO: 748 10*3/MM3 (ref 140–450)
PMV BLD AUTO: 9.2 FL (ref 6–12)
POTASSIUM BLD-SCNC: 4.5 MMOL/L (ref 3.5–5.2)
PROCALCITONIN SERPL-MCNC: 0.06 NG/ML (ref 0.1–0.25)
PROT SERPL-MCNC: 7.3 G/DL (ref 6–8.5)
RBC # BLD AUTO: 3.5 10*6/MM3 (ref 3.77–5.28)
RHINOVIRUS RNA SPEC NAA+PROBE: NOT DETECTED
RSV RNA NPH QL NAA+NON-PROBE: NOT DETECTED
SODIUM BLD-SCNC: 139 MMOL/L (ref 136–145)
WBC NRBC COR # BLD: 9.69 10*3/MM3 (ref 3.4–10.8)

## 2020-02-28 PROCEDURE — 83605 ASSAY OF LACTIC ACID: CPT | Performed by: NURSE PRACTITIONER

## 2020-02-28 PROCEDURE — 83880 ASSAY OF NATRIURETIC PEPTIDE: CPT | Performed by: NURSE PRACTITIONER

## 2020-02-28 PROCEDURE — 71046 X-RAY EXAM CHEST 2 VIEWS: CPT

## 2020-02-28 PROCEDURE — 87040 BLOOD CULTURE FOR BACTERIA: CPT | Performed by: NURSE PRACTITIONER

## 2020-02-28 PROCEDURE — 0100U HC BIOFIRE FILMARRAY RESP PANEL 2: CPT | Performed by: NURSE PRACTITIONER

## 2020-02-28 PROCEDURE — 80053 COMPREHEN METABOLIC PANEL: CPT | Performed by: NURSE PRACTITIONER

## 2020-02-28 PROCEDURE — 25010000002 ONDANSETRON PER 1 MG: Performed by: NURSE PRACTITIONER

## 2020-02-28 PROCEDURE — 94799 UNLISTED PULMONARY SVC/PX: CPT

## 2020-02-28 PROCEDURE — 85025 COMPLETE CBC W/AUTO DIFF WBC: CPT | Performed by: NURSE PRACTITIONER

## 2020-02-28 PROCEDURE — 84145 PROCALCITONIN (PCT): CPT | Performed by: NURSE PRACTITIONER

## 2020-02-28 RX ORDER — ACETAMINOPHEN 650 MG/1
650 SUPPOSITORY RECTAL EVERY 4 HOURS PRN
Status: DISCONTINUED | OUTPATIENT
Start: 2020-02-28 | End: 2020-03-01 | Stop reason: HOSPADM

## 2020-02-28 RX ORDER — IPRATROPIUM BROMIDE AND ALBUTEROL SULFATE 2.5; .5 MG/3ML; MG/3ML
3 SOLUTION RESPIRATORY (INHALATION)
Status: DISCONTINUED | OUTPATIENT
Start: 2020-02-28 | End: 2020-03-01 | Stop reason: HOSPADM

## 2020-02-28 RX ORDER — NITROGLYCERIN 0.4 MG/1
0.4 TABLET SUBLINGUAL
Status: DISCONTINUED | OUTPATIENT
Start: 2020-02-28 | End: 2020-03-01 | Stop reason: HOSPADM

## 2020-02-28 RX ORDER — BUPROPION HYDROCHLORIDE 150 MG/1
150 TABLET, EXTENDED RELEASE ORAL 2 TIMES DAILY
Status: DISCONTINUED | OUTPATIENT
Start: 2020-02-28 | End: 2020-03-01 | Stop reason: HOSPADM

## 2020-02-28 RX ORDER — VILAZODONE HYDROCHLORIDE 40 MG/1
40 TABLET ORAL EVERY MORNING
Status: DISCONTINUED | OUTPATIENT
Start: 2020-02-29 | End: 2020-03-01 | Stop reason: HOSPADM

## 2020-02-28 RX ORDER — ACETAMINOPHEN 325 MG/1
650 TABLET ORAL EVERY 4 HOURS PRN
Status: DISCONTINUED | OUTPATIENT
Start: 2020-02-28 | End: 2020-03-01 | Stop reason: HOSPADM

## 2020-02-28 RX ORDER — ACETAMINOPHEN 160 MG/5ML
650 SOLUTION ORAL EVERY 4 HOURS PRN
Status: DISCONTINUED | OUTPATIENT
Start: 2020-02-28 | End: 2020-03-01 | Stop reason: HOSPADM

## 2020-02-28 RX ORDER — SODIUM CHLORIDE 9 MG/ML
75 INJECTION, SOLUTION INTRAVENOUS CONTINUOUS
Status: ACTIVE | OUTPATIENT
Start: 2020-02-28 | End: 2020-02-29

## 2020-02-28 RX ORDER — ONDANSETRON 2 MG/ML
4 INJECTION INTRAMUSCULAR; INTRAVENOUS ONCE
Status: COMPLETED | OUTPATIENT
Start: 2020-02-28 | End: 2020-02-28

## 2020-02-28 RX ORDER — SODIUM CHLORIDE 0.9 % (FLUSH) 0.9 %
10 SYRINGE (ML) INJECTION AS NEEDED
Status: DISCONTINUED | OUTPATIENT
Start: 2020-02-28 | End: 2020-03-01 | Stop reason: HOSPADM

## 2020-02-28 RX ORDER — SODIUM CHLORIDE 0.9 % (FLUSH) 0.9 %
10 SYRINGE (ML) INJECTION EVERY 12 HOURS SCHEDULED
Status: DISCONTINUED | OUTPATIENT
Start: 2020-02-28 | End: 2020-03-01 | Stop reason: HOSPADM

## 2020-02-28 RX ORDER — ONDANSETRON 2 MG/ML
4 INJECTION INTRAMUSCULAR; INTRAVENOUS EVERY 6 HOURS PRN
Status: DISCONTINUED | OUTPATIENT
Start: 2020-02-28 | End: 2020-03-01 | Stop reason: HOSPADM

## 2020-02-28 RX ADMIN — ONDANSETRON 4 MG: 2 INJECTION INTRAMUSCULAR; INTRAVENOUS at 21:19

## 2020-02-28 RX ADMIN — IPRATROPIUM BROMIDE AND ALBUTEROL SULFATE 3 ML: 2.5; .5 SOLUTION RESPIRATORY (INHALATION) at 21:04

## 2020-02-28 RX ADMIN — SODIUM CHLORIDE 75 ML/HR: 9 INJECTION, SOLUTION INTRAVENOUS at 18:15

## 2020-02-28 RX ADMIN — BUPROPION HYDROCHLORIDE 150 MG: 150 TABLET, EXTENDED RELEASE ORAL at 22:22

## 2020-02-28 RX ADMIN — SODIUM CHLORIDE, PRESERVATIVE FREE 10 ML: 5 INJECTION INTRAVENOUS at 18:15

## 2020-02-29 ENCOUNTER — APPOINTMENT (OUTPATIENT)
Dept: CT IMAGING | Facility: HOSPITAL | Age: 70
End: 2020-02-29

## 2020-02-29 ENCOUNTER — APPOINTMENT (OUTPATIENT)
Dept: CARDIOLOGY | Facility: HOSPITAL | Age: 70
End: 2020-02-29

## 2020-02-29 PROBLEM — R06.00 DYSPNEA: Status: ACTIVE | Noted: 2020-02-29

## 2020-02-29 LAB
ANION GAP SERPL CALCULATED.3IONS-SCNC: 12 MMOL/L (ref 5–15)
BH CV LOWER VASCULAR LEFT COMMON FEMORAL AUGMENT: NORMAL
BH CV LOWER VASCULAR LEFT COMMON FEMORAL COMPETENT: NORMAL
BH CV LOWER VASCULAR LEFT COMMON FEMORAL COMPRESS: NORMAL
BH CV LOWER VASCULAR LEFT COMMON FEMORAL PHASIC: NORMAL
BH CV LOWER VASCULAR LEFT COMMON FEMORAL SPONT: NORMAL
BH CV LOWER VASCULAR LEFT DISTAL FEMORAL COMPRESS: NORMAL
BH CV LOWER VASCULAR LEFT GASTRONEMIUS COMPRESS: NORMAL
BH CV LOWER VASCULAR LEFT GREATER SAPH AK COMPRESS: NORMAL
BH CV LOWER VASCULAR LEFT GREATER SAPH BK COMPRESS: NORMAL
BH CV LOWER VASCULAR LEFT MID FEMORAL AUGMENT: NORMAL
BH CV LOWER VASCULAR LEFT MID FEMORAL COMPETENT: NORMAL
BH CV LOWER VASCULAR LEFT MID FEMORAL COMPRESS: NORMAL
BH CV LOWER VASCULAR LEFT MID FEMORAL PHASIC: NORMAL
BH CV LOWER VASCULAR LEFT MID FEMORAL SPONT: NORMAL
BH CV LOWER VASCULAR LEFT PERONEAL COMPRESS: NORMAL
BH CV LOWER VASCULAR LEFT POPLITEAL AUGMENT: NORMAL
BH CV LOWER VASCULAR LEFT POPLITEAL COMPETENT: NORMAL
BH CV LOWER VASCULAR LEFT POPLITEAL COMPRESS: NORMAL
BH CV LOWER VASCULAR LEFT POPLITEAL PHASIC: NORMAL
BH CV LOWER VASCULAR LEFT POPLITEAL SPONT: NORMAL
BH CV LOWER VASCULAR LEFT POSTERIOR TIBIAL COMPRESS: NORMAL
BH CV LOWER VASCULAR LEFT PROFUNDA FEMORAL COMPRESS: NORMAL
BH CV LOWER VASCULAR LEFT PROXIMAL FEMORAL COMPRESS: NORMAL
BH CV LOWER VASCULAR LEFT SAPHENOFEMORAL JUNCTION COMPRESS: NORMAL
BH CV LOWER VASCULAR RIGHT COMMON FEMORAL AUGMENT: NORMAL
BH CV LOWER VASCULAR RIGHT COMMON FEMORAL COMPETENT: NORMAL
BH CV LOWER VASCULAR RIGHT COMMON FEMORAL COMPRESS: NORMAL
BH CV LOWER VASCULAR RIGHT COMMON FEMORAL PHASIC: NORMAL
BH CV LOWER VASCULAR RIGHT COMMON FEMORAL SPONT: NORMAL
BH CV LOWER VASCULAR RIGHT DISTAL FEMORAL COMPRESS: NORMAL
BH CV LOWER VASCULAR RIGHT GASTRONEMIUS COMPRESS: NORMAL
BH CV LOWER VASCULAR RIGHT GREATER SAPH AK COMPRESS: NORMAL
BH CV LOWER VASCULAR RIGHT GREATER SAPH BK COMPRESS: NORMAL
BH CV LOWER VASCULAR RIGHT MID FEMORAL AUGMENT: NORMAL
BH CV LOWER VASCULAR RIGHT MID FEMORAL COMPETENT: NORMAL
BH CV LOWER VASCULAR RIGHT MID FEMORAL COMPRESS: NORMAL
BH CV LOWER VASCULAR RIGHT MID FEMORAL PHASIC: NORMAL
BH CV LOWER VASCULAR RIGHT MID FEMORAL SPONT: NORMAL
BH CV LOWER VASCULAR RIGHT PERONEAL COMPRESS: NORMAL
BH CV LOWER VASCULAR RIGHT POPLITEAL AUGMENT: NORMAL
BH CV LOWER VASCULAR RIGHT POPLITEAL COMPETENT: NORMAL
BH CV LOWER VASCULAR RIGHT POPLITEAL COMPRESS: NORMAL
BH CV LOWER VASCULAR RIGHT POPLITEAL PHASIC: NORMAL
BH CV LOWER VASCULAR RIGHT POPLITEAL SPONT: NORMAL
BH CV LOWER VASCULAR RIGHT POSTERIOR TIBIAL COMPRESS: NORMAL
BH CV LOWER VASCULAR RIGHT PROFUNDA FEMORAL COMPRESS: NORMAL
BH CV LOWER VASCULAR RIGHT PROXIMAL FEMORAL COMPRESS: NORMAL
BH CV LOWER VASCULAR RIGHT SAPHENOFEMORAL JUNCTION COMPRESS: NORMAL
BUN BLD-MCNC: 12 MG/DL (ref 8–23)
BUN/CREAT SERPL: 12.2 (ref 7–25)
CALCIUM SPEC-SCNC: 8.7 MG/DL (ref 8.6–10.5)
CHLORIDE SERPL-SCNC: 105 MMOL/L (ref 98–107)
CO2 SERPL-SCNC: 22 MMOL/L (ref 22–29)
CREAT BLD-MCNC: 0.98 MG/DL (ref 0.57–1)
DEPRECATED RDW RBC AUTO: 44.4 FL (ref 37–54)
ERYTHROCYTE [DISTWIDTH] IN BLOOD BY AUTOMATED COUNT: 13.9 % (ref 12.3–15.4)
FERRITIN SERPL-MCNC: 64.9 NG/ML (ref 13–150)
FOLATE SERPL-MCNC: 10.1 NG/ML (ref 4.78–24.2)
GFR SERPL CREATININE-BSD FRML MDRD: 56 ML/MIN/1.73
GLUCOSE BLD-MCNC: 124 MG/DL (ref 65–99)
HCT VFR BLD AUTO: 29.1 % (ref 34–46.6)
HGB BLD-MCNC: 9.1 G/DL (ref 12–15.9)
IRON 24H UR-MRATE: 33 MCG/DL (ref 37–145)
IRON SATN MFR SERPL: 9 % (ref 20–50)
MCH RBC QN AUTO: 27.4 PG (ref 26.6–33)
MCHC RBC AUTO-ENTMCNC: 31.3 G/DL (ref 31.5–35.7)
MCV RBC AUTO: 87.7 FL (ref 79–97)
PLATELET # BLD AUTO: 674 10*3/MM3 (ref 140–450)
PMV BLD AUTO: 9.2 FL (ref 6–12)
POTASSIUM BLD-SCNC: 4.2 MMOL/L (ref 3.5–5.2)
RBC # BLD AUTO: 3.32 10*6/MM3 (ref 3.77–5.28)
RETICS # AUTO: 0.09 10*6/MM3 (ref 0.02–0.13)
RETICS/RBC NFR AUTO: 2.66 % (ref 0.7–1.9)
SODIUM BLD-SCNC: 139 MMOL/L (ref 136–145)
TIBC SERPL-MCNC: 373 MCG/DL (ref 298–536)
TRANSFERRIN SERPL-MCNC: 250 MG/DL (ref 200–360)
VIT B12 BLD-MCNC: 821 PG/ML (ref 211–946)
WBC NRBC COR # BLD: 8.44 10*3/MM3 (ref 3.4–10.8)

## 2020-02-29 PROCEDURE — 0 IOPAMIDOL PER 1 ML: Performed by: HOSPITALIST

## 2020-02-29 PROCEDURE — 85027 COMPLETE CBC AUTOMATED: CPT | Performed by: NURSE PRACTITIONER

## 2020-02-29 PROCEDURE — 25010000002 IOPAMIDOL 61 % SOLUTION: Performed by: HOSPITALIST

## 2020-02-29 PROCEDURE — 83540 ASSAY OF IRON: CPT | Performed by: NURSE PRACTITIONER

## 2020-02-29 PROCEDURE — 94799 UNLISTED PULMONARY SVC/PX: CPT

## 2020-02-29 PROCEDURE — 82746 ASSAY OF FOLIC ACID SERUM: CPT | Performed by: NURSE PRACTITIONER

## 2020-02-29 PROCEDURE — 85045 AUTOMATED RETICULOCYTE COUNT: CPT | Performed by: NURSE PRACTITIONER

## 2020-02-29 PROCEDURE — 71275 CT ANGIOGRAPHY CHEST: CPT

## 2020-02-29 PROCEDURE — 82607 VITAMIN B-12: CPT | Performed by: NURSE PRACTITIONER

## 2020-02-29 PROCEDURE — 25010000002 ONDANSETRON PER 1 MG: Performed by: NURSE PRACTITIONER

## 2020-02-29 PROCEDURE — 82728 ASSAY OF FERRITIN: CPT | Performed by: NURSE PRACTITIONER

## 2020-02-29 PROCEDURE — 80048 BASIC METABOLIC PNL TOTAL CA: CPT | Performed by: NURSE PRACTITIONER

## 2020-02-29 PROCEDURE — 93970 EXTREMITY STUDY: CPT

## 2020-02-29 PROCEDURE — 84466 ASSAY OF TRANSFERRIN: CPT | Performed by: NURSE PRACTITIONER

## 2020-02-29 RX ADMIN — SODIUM CHLORIDE, PRESERVATIVE FREE 10 ML: 5 INJECTION INTRAVENOUS at 09:15

## 2020-02-29 RX ADMIN — IOPAMIDOL 95 ML: 612 INJECTION, SOLUTION INTRAVENOUS at 17:26

## 2020-02-29 RX ADMIN — VILAZODONE HYDROCHLORIDE 40 MG: 40 TABLET ORAL at 06:27

## 2020-02-29 RX ADMIN — IPRATROPIUM BROMIDE AND ALBUTEROL SULFATE 3 ML: 2.5; .5 SOLUTION RESPIRATORY (INHALATION) at 07:36

## 2020-02-29 RX ADMIN — BUPROPION HYDROCHLORIDE 150 MG: 150 TABLET, EXTENDED RELEASE ORAL at 09:15

## 2020-02-29 RX ADMIN — BUPROPION HYDROCHLORIDE 150 MG: 150 TABLET, EXTENDED RELEASE ORAL at 20:49

## 2020-02-29 RX ADMIN — SODIUM CHLORIDE, PRESERVATIVE FREE 10 ML: 5 INJECTION INTRAVENOUS at 20:49

## 2020-02-29 RX ADMIN — ONDANSETRON 4 MG: 2 INJECTION INTRAMUSCULAR; INTRAVENOUS at 15:35

## 2020-02-29 RX ADMIN — IOPAMIDOL 100 ML: 755 INJECTION, SOLUTION INTRAVENOUS at 17:15

## 2020-03-01 VITALS
HEIGHT: 64 IN | TEMPERATURE: 97.9 F | RESPIRATION RATE: 16 BRPM | WEIGHT: 200.1 LBS | OXYGEN SATURATION: 93 % | BODY MASS INDEX: 34.16 KG/M2 | HEART RATE: 106 BPM | DIASTOLIC BLOOD PRESSURE: 55 MMHG | SYSTOLIC BLOOD PRESSURE: 118 MMHG

## 2020-03-01 PROCEDURE — 94799 UNLISTED PULMONARY SVC/PX: CPT

## 2020-03-01 RX ADMIN — VILAZODONE HYDROCHLORIDE 40 MG: 40 TABLET ORAL at 06:13

## 2020-03-01 RX ADMIN — SODIUM CHLORIDE, PRESERVATIVE FREE 10 ML: 5 INJECTION INTRAVENOUS at 09:22

## 2020-03-01 RX ADMIN — BUPROPION HYDROCHLORIDE 150 MG: 150 TABLET, EXTENDED RELEASE ORAL at 09:22

## 2020-03-01 RX ADMIN — IPRATROPIUM BROMIDE AND ALBUTEROL SULFATE 3 ML: 2.5; .5 SOLUTION RESPIRATORY (INHALATION) at 12:14

## 2020-03-01 NOTE — PROGRESS NOTES
Case Management Discharge Note      Final Note: Pt discharged home with JOSIAS Kovacs RN         Destination      No service has been selected for the patient.      Durable Medical Equipment      No service has been selected for the patient.      Dialysis/Infusion      No service has been selected for the patient.      Home Medical Care      Service Provider Request Status Selected Services Address Phone Number Fax Number    [facility] [address] [place] [address] [phone]      Therapy      No service has been selected for the patient.      Community Resources      No service has been selected for the patient.        Transportation Services  Private: Car    Final Discharge Disposition Code: 06 - home with home health care

## 2020-03-01 NOTE — PLAN OF CARE
C/o SOA with activity. Respirations unlabored and regular at rest. Room air sats >90%.  Right drsg intact. Telemetry NSR/ST. Pt eager to go home.  Problem: Patient Care Overview  Goal: Plan of Care Review  Outcome: Outcome(s) achieved  Goal: Individualization and Mutuality  Outcome: Outcome(s) achieved  Goal: Discharge Needs Assessment  Outcome: Outcome(s) achieved  Goal: Interprofessional Rounds/Family Conf  Outcome: Outcome(s) achieved     Problem: Patient Care Overview  Goal: Plan of Care Review  Outcome: Outcome(s) achieved  Goal: Individualization and Mutuality  Outcome: Outcome(s) achieved  Goal: Discharge Needs Assessment  Outcome: Outcome(s) achieved  Goal: Interprofessional Rounds/Family Conf  Outcome: Outcome(s) achieved     Problem: Breathing Pattern Ineffective (Adult)  Goal: Identify Related Risk Factors and Signs and Symptoms  Outcome: Outcome(s) achieved  Goal: Effective Oxygenation/Ventilation  Outcome: Outcome(s) achieved  Goal: Anxiety/Fear Reduction  Outcome: Outcome(s) achieved     Problem: Infection, Risk/Actual (Adult)  Goal: Identify Related Risk Factors and Signs and Symptoms  Outcome: Outcome(s) achieved  Goal: Infection Prevention/Resolution  Outcome: Outcome(s) achieved     Problem: Pneumonia (Adult)  Goal: Signs and Symptoms of Listed Potential Problems Will be Absent, Minimized or Managed (Pneumonia)  Outcome: Outcome(s) achieved

## 2020-03-01 NOTE — DISCHARGE SUMMARY
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     Hospital Course  Please see history and physical for details. Patient is a 69 y.o. female had recent right hip replacement and readmission for pneumonia saw her primary care physician for persistent shortness of breath and dyspnea on exertion.  She had elevated WBC as well as platelets.  She was admitted for further evaluation.  There was concern for PE since she was tachycardic and a CTA chest showed improvement in opacities.  But there was no evidence of pulmonary embolism.  There was some adenopathy felt to be reactive.  She should have follow-up CT scanning of the lungs to ensure resolution of pneumonia as well as adenopathy.  Dopplers were negative for clot.  She had a recent echocardiogram that was unremarkable.  Follow-up labs showed a WBC of 8.4K, hemoglobin 9.1, platelets 674,000.  Her symptoms improved and she has less dyspnea on exertion.  Suspect she has some deconditioning causing dyspnea on exertion and tachycardia due to her two recent hospitalizations one of which was due to pneumonia and also anemia lower than her usual.  She feels improved compared to admission and would like to go home today.    Results for orders placed during the hospital encounter of 02/17/20   Adult  Transthoracic Echo Complete W/ Cont if Necessary Per Protocol    Narrative · Left ventricular wall thickness is consistent with mild concentric   hypertrophy.  · Estimated EF = 70%.  · Left ventricular systolic function is normal.        I discussed the patient's findings and my recommendations with patient and family.    Condition on Discharge: Improved.  She would very much like to go home today.    Temp:  [97.8 °F (36.6 °C)-97.9 °F (36.6 °C)] 97.9 °F (36.6 °C)  Heart Rate:  [] 106  Resp:  [16-18] 16  BP: (118-145)/(55-80) 118/55  Body mass index is 34.35 kg/m².    Physical Exam   Constitutional: She is oriented to person, place, and time. No distress.   Cardiovascular: Normal rate and regular rhythm.   Pulmonary/Chest: Effort normal and breath sounds normal. No respiratory distress.   Abdominal: Soft. Bowel sounds are normal. There is no tenderness. There is no rebound and no guarding.   Musculoskeletal: She exhibits no edema.   Neurological: She is alert and oriented to person, place, and time.   Skin: Skin is warm and dry.   Psychiatric: She has a normal mood and affect. Her behavior is normal.   Nursing note and vitals reviewed.       Discharge Medications      Continue These Medications      Instructions Start Date   ALPRAZolam 1 MG tablet  Commonly known as:  XANAX   1 mg, Oral, 4 Times Daily PRN      buPROPion  MG 12 hr tablet  Commonly known as:  WELLBUTRIN SR   150 mg, Oral, 2 Times Daily      vilazodone 40 MG tablet tablet  Commonly known as:  VIIBRYD   40 mg, Oral, Every Morning         Stop These Medications    cefdinir 300 MG capsule  Commonly known as:  OMNICEF           Diet Instructions     Diet: Regular      Discharge Diet:  Regular         Activity Instructions     Activity as Tolerated           Additional Instructions for the Follow-ups that You Need to Schedule     Call MD for problems / concerns.   As directed      CT Chest Without Contrast   Apr 15, 2020        Follow-up  Information     Grant Layne MD Follow up in 1 week(s).    Specialty:  Family Medicine  Contact information:  [address]  [facility] [address]  [facility] [id number] [phone]             Baltazar Oscar MD Follow up.    Specialties:  Pulmonary Disease, Sleep Medicine  Contact information:  [address]  [facility] [address]  [facility] [id number] [phone]                 Test Results Pending at Discharge   Order Current Status    Blood Culture - Blood, Arm, Left Preliminary result    Blood Culture - Blood, Arm, Right Preliminary result         Dario Henry MD  03/01/20  1:47 PM    Discharge time spent greater than 30 minutes.

## 2020-03-01 NOTE — PLAN OF CARE
C/o SOA with activity. Using 02 prn. Dopplers and CT Scan completed. Telemetry NSR/ST. Nursing to continue to monitor.  Problem: Patient Care Overview  Goal: Plan of Care Review  Outcome: Ongoing (interventions implemented as appropriate)  Goal: Individualization and Mutuality  Outcome: Ongoing (interventions implemented as appropriate)  Goal: Discharge Needs Assessment  Outcome: Ongoing (interventions implemented as appropriate)  Goal: Interprofessional Rounds/Family Conf  Outcome: Ongoing (interventions implemented as appropriate)     Problem: Patient Care Overview  Goal: Plan of Care Review  Outcome: Ongoing (interventions implemented as appropriate)  Goal: Individualization and Mutuality  Outcome: Ongoing (interventions implemented as appropriate)  Goal: Discharge Needs Assessment  Outcome: Ongoing (interventions implemented as appropriate)  Goal: Interprofessional Rounds/Family Conf  Outcome: Ongoing (interventions implemented as appropriate)     Problem: Breathing Pattern Ineffective (Adult)  Goal: Identify Related Risk Factors and Signs and Symptoms  Outcome: Ongoing (interventions implemented as appropriate)  Goal: Effective Oxygenation/Ventilation  Outcome: Ongoing (interventions implemented as appropriate)  Goal: Anxiety/Fear Reduction  Outcome: Ongoing (interventions implemented as appropriate)     Problem: Infection, Risk/Actual (Adult)  Goal: Identify Related Risk Factors and Signs and Symptoms  Outcome: Ongoing (interventions implemented as appropriate)  Goal: Infection Prevention/Resolution  Outcome: Ongoing (interventions implemented as appropriate)     Problem: Pneumonia (Adult)  Goal: Signs and Symptoms of Listed Potential Problems Will be Absent, Minimized or Managed (Pneumonia)  Outcome: Ongoing (interventions implemented as appropriate)

## 2020-03-01 NOTE — PLAN OF CARE
Problem: Patient Care Overview  Goal: Plan of Care Review  Outcome: Ongoing (interventions implemented as appropriate)     Problem: Breathing Pattern Ineffective (Adult)  Goal: Identify Related Risk Factors and Signs and Symptoms  Outcome: Ongoing (interventions implemented as appropriate)     Problem: Breathing Pattern Ineffective (Adult)  Goal: Anxiety/Fear Reduction  Outcome: Ongoing (interventions implemented as appropriate)     Problem: Infection, Risk/Actual (Adult)  Goal: Identify Related Risk Factors and Signs and Symptoms  Outcome: Ongoing (interventions implemented as appropriate)     Problem: Pneumonia (Adult)  Goal: Signs and Symptoms of Listed Potential Problems Will be Absent, Minimized or Managed (Pneumonia)  Outcome: Ongoing (interventions implemented as appropriate)   Pt had a quiet night, slept all night, got up x3 to br ad thelma, no c/o voiced, vss,  on RA all night, no c/o pain, or sob. Vss. Dsg to R hip incision chnaged, dry scab with areas of moisture by groin area, odor noted, no drainage.

## 2020-03-02 ENCOUNTER — READMISSION MANAGEMENT (OUTPATIENT)
Dept: CALL CENTER | Facility: HOSPITAL | Age: 70
End: 2020-03-02

## 2020-03-02 LAB — BACTERIA SPEC AEROBE CULT: NORMAL

## 2020-03-02 NOTE — OUTREACH NOTE
COPD/PN Week 2 Survey      Responses   Facility patient discharged from?  [facility]   Does the patient have one of the following disease processes/diagnoses(primary or secondary)?  COPD/Pneumonia   Was the primary reason for admission:  Pneumonia   Week 2 attempt successful?  No   Rescheduled  Revoked   Revoke  Readmitted          Kaylie Rodríguez RN

## 2020-03-02 NOTE — OUTREACH NOTE
Prep Survey      Responses   Facility patient discharged from?  [facility]   Is patient eligible?  Yes   Discharge diagnosis  Dyspnea, Sjogren's syndrome, Carrier of MRSA, dyspnea on exertion, thrombocytosis, anemia, Bilateral pneumonia, COPD with hypoxia, CKD III   Does the patient have one of the following disease processes/diagnoses(primary or secondary)?  COPD/Pneumonia   Does the patient have Home health ordered?  Yes   What is the Home health agency?   [facility]    Is there a DME ordered?  No   Comments regarding appointments  Pt to schedule F/U appointments   Prep survey completed?  Yes          Kaylie Rodríguez RN

## 2020-03-03 ENCOUNTER — READMISSION MANAGEMENT (OUTPATIENT)
Dept: CALL CENTER | Facility: HOSPITAL | Age: 70
End: 2020-03-03

## 2020-03-03 NOTE — OUTREACH NOTE
COPD/PN Week 1 Survey      Responses   [facility] patient discharged from?  [place]   Does the patient have one of the following disease processes/diagnoses(primary or secondary)?  COPD/Pneumonia   Is there a successful TCM telephone encounter documented?  No   Was the primary reason for admission:  Pneumonia   Week 1 attempt successful?  Yes   Call start time  1149   Call end time  1152   Discharge diagnosis  Dyspnea, Sjogren's syndrome, Carrier of MRSA, dyspnea on exertion, thrombocytosis, anemia, Bilateral pneumonia, COPD with hypoxia, CKD III   Meds reviewed with patient/caregiver?  Yes   Is the patient having any side effects they believe may be caused by any medication additions or changes?  No   Does the patient have all medications ordered at discharge?  N/A   Is the patient taking all medications as directed (includes completed medication regime)?  Yes   Does the patient have a primary care provider?   Yes   Does the patient have an appointment with their PCP or pulmonologist within 7 days of discharge?  Yes   Has the patient kept scheduled appointments due by today?  N/A   Has home health visited the patient within 72 hours of discharge?  N/A   Psychosocial issues?  No   Did the patient receive a copy of their discharge instructions?  Yes   Nursing interventions  Reviewed instructions with patient   What is the patient's perception of their health status since discharge?  Improving   Nursing Interventions  Nurse provided patient education   Are the patient's immunizations up to date?   Yes   Nursing interventions  Educated on importance of maintaining up to date immunizations as advised by provider   Is the patient/caregiver able to teach back the hierarchy of who to call/visit for symptoms/problems? PCP, Specialist, Home health nurse, Urgent Care, ED, 911  Yes   Additional teach back comments  Encouraged long, slow breaths in and out with IS multiple x an hor while awake.   Is the  patient/caregiver able to teach back signs and symptoms of worsening condition:  Shortness of breath, Fever/chills, Chest pain   Is the patient/caregiver able to teach back importance of completing antibiotic course of treatment?  Yes   Week 1 call completed?  Yes          Vashti Mckenna RN

## 2020-03-04 LAB
BACTERIA SPEC AEROBE CULT: NORMAL
BACTERIA SPEC AEROBE CULT: NORMAL

## 2020-03-09 ENCOUNTER — READMISSION MANAGEMENT (OUTPATIENT)
Dept: CALL CENTER | Facility: HOSPITAL | Age: 70
End: 2020-03-09

## 2020-03-09 NOTE — OUTREACH NOTE
COPD/PN Week 2 Survey      Responses   [facility] patient discharged from?  [place]   Does the patient have one of the following disease processes/diagnoses(primary or secondary)?  COPD/Pneumonia   Was the primary reason for admission:  Pneumonia   Week 2 attempt successful?  No   Unsuccessful attempts  Attempt 1          Kailee Wylie RN

## 2020-03-12 ENCOUNTER — READMISSION MANAGEMENT (OUTPATIENT)
Dept: CALL CENTER | Facility: HOSPITAL | Age: 70
End: 2020-03-12

## 2020-03-12 NOTE — OUTREACH NOTE
COPD/PN Week 2 Survey      Responses   [facility] patient discharged from?  [place]   Does the patient have one of the following disease processes/diagnoses(primary or secondary)?  COPD/Pneumonia   Was the primary reason for admission:  Pneumonia   Week 2 attempt successful?  Yes   Call start time  1411   Call end time  1415   Discharge diagnosis  Dyspnea, Sjogren's syndrome, Carrier of MRSA, dyspnea on exertion, thrombocytosis, anemia, Bilateral pneumonia, COPD with hypoxia, CKD III   Meds reviewed with patient/caregiver?  Yes   Is the patient having any side effects they believe may be caused by any medication additions or changes?  No   Does the patient have all medications ordered at discharge?  Yes   Prescription comments  Pt states PCP has ordered iron suppliment due to concerns of anemia   Is the patient taking all medications as directed (includes completed medication regime)?  Yes   Does the patient have a primary care provider?   Yes   Comments regarding PCP  Pt has seen PCP and orders have been made for iron suppliments and labwork   Has the patient kept scheduled appointments due by today?  Yes   Comments  PCP is concerned of anemia and is planning labwork on tuesday and currently ordered iron   Psychosocial issues?  No   Did the patient receive a copy of their discharge instructions?  Yes   Nursing interventions  Reviewed instructions with patient   What is the patient's perception of their health status since discharge?  New symptoms unrelated to diagnosis [Pt states PCP has investigating the possibility of anemia due to pt continuing to be short of breath.]   Are the patient's immunizations up to date?   Yes   Is the patient/caregiver able to teach back the hierarchy of who to call/visit for symptoms/problems? PCP, Specialist, Home health nurse, Urgent Care, ED, 911  Yes   Is the patient able to teach back COPD zones?  Yes   Week 2 call completed?  Yes   Wrap up additional comments  Pt  reports she is doing ok.  She denies any needs at this time.           Sheri Salcido RN

## 2020-12-15 ENCOUNTER — TRANSCRIBE ORDERS (OUTPATIENT)
Dept: ADMINISTRATIVE | Facility: HOSPITAL | Age: 70
End: 2020-12-15

## 2020-12-15 DIAGNOSIS — N18.30 STAGE 3 CHRONIC KIDNEY DISEASE, UNSPECIFIED WHETHER STAGE 3A OR 3B CKD (HCC): Primary | ICD-10-CM

## 2020-12-21 ENCOUNTER — HOSPITAL ENCOUNTER (OUTPATIENT)
Dept: ULTRASOUND IMAGING | Facility: HOSPITAL | Age: 70
Discharge: HOME | End: 2020-12-21
Admitting: FAMILY MEDICINE

## 2020-12-21 DIAGNOSIS — N18.30 STAGE 3 CHRONIC KIDNEY DISEASE, UNSPECIFIED WHETHER STAGE 3A OR 3B CKD (HCC): ICD-10-CM

## 2020-12-21 PROCEDURE — 76775 US EXAM ABDO BACK WALL LIM: CPT

## 2021-03-03 DIAGNOSIS — Z23 IMMUNIZATION DUE: ICD-10-CM

## 2021-11-09 ENCOUNTER — TRANSCRIBE ORDERS (OUTPATIENT)
Dept: ADMINISTRATIVE | Facility: HOSPITAL | Age: 71
End: 2021-11-09

## 2021-11-09 DIAGNOSIS — Z12.31 VISIT FOR SCREENING MAMMOGRAM: Primary | ICD-10-CM

## 2022-01-04 ENCOUNTER — APPOINTMENT (OUTPATIENT)
Dept: MAMMOGRAPHY | Facility: HOSPITAL | Age: 72
End: 2022-01-04

## 2022-01-17 ENCOUNTER — APPOINTMENT (OUTPATIENT)
Dept: WOMENS IMAGING | Facility: HOSPITAL | Age: 72
End: 2022-01-17

## 2022-01-17 PROCEDURE — 77063 BREAST TOMOSYNTHESIS BI: CPT | Performed by: RADIOLOGY

## 2022-01-17 PROCEDURE — 77067 SCR MAMMO BI INCL CAD: CPT | Performed by: RADIOLOGY

## 2022-08-22 ENCOUNTER — TRANSCRIBE ORDERS (OUTPATIENT)
Dept: ADMINISTRATIVE | Facility: HOSPITAL | Age: 72
End: 2022-08-22

## 2022-08-22 DIAGNOSIS — R09.89 BRUIT: Primary | ICD-10-CM

## 2022-08-24 ENCOUNTER — TRANSCRIBE ORDERS (OUTPATIENT)
Dept: CARDIOLOGY | Facility: CLINIC | Age: 72
End: 2022-08-24

## 2022-08-24 ENCOUNTER — HOSPITAL ENCOUNTER (OUTPATIENT)
Dept: CARDIOLOGY | Facility: HOSPITAL | Age: 72
Discharge: HOME | End: 2022-08-24
Admitting: FAMILY MEDICINE

## 2022-08-24 DIAGNOSIS — R09.89 BRUIT: ICD-10-CM

## 2022-08-24 DIAGNOSIS — R60.0 LOWER EXTREMITY EDEMA: Primary | ICD-10-CM

## 2022-08-24 LAB
BH CV XLRA MEAS LEFT DIST CCA EDV: 35.4 CM/SEC
BH CV XLRA MEAS LEFT DIST CCA PSV: 152 CM/SEC
BH CV XLRA MEAS LEFT DIST ICA EDV: -36.9 CM/SEC
BH CV XLRA MEAS LEFT DIST ICA PSV: -156 CM/SEC
BH CV XLRA MEAS LEFT ICA/CCA RATIO: -1.03
BH CV XLRA MEAS LEFT MID ICA EDV: -33.8 CM/SEC
BH CV XLRA MEAS LEFT MID ICA PSV: -126 CM/SEC
BH CV XLRA MEAS LEFT PROX CCA EDV: 21.2 CM/SEC
BH CV XLRA MEAS LEFT PROX CCA PSV: 115 CM/SEC
BH CV XLRA MEAS LEFT PROX ECA EDV: -20.4 CM/SEC
BH CV XLRA MEAS LEFT PROX ECA PSV: -308 CM/SEC
BH CV XLRA MEAS LEFT PROX ICA EDV: 22.8 CM/SEC
BH CV XLRA MEAS LEFT PROX ICA PSV: 112 CM/SEC
BH CV XLRA MEAS LEFT PROX SCLA PSV: 190 CM/SEC
BH CV XLRA MEAS LEFT VERTEBRAL A EDV: 22.8 CM/SEC
BH CV XLRA MEAS LEFT VERTEBRAL A PSV: 101 CM/SEC
BH CV XLRA MEAS RIGHT DIST CCA EDV: 26.5 CM/SEC
BH CV XLRA MEAS RIGHT DIST CCA PSV: 101 CM/SEC
BH CV XLRA MEAS RIGHT DIST ICA EDV: -21.7 CM/SEC
BH CV XLRA MEAS RIGHT DIST ICA PSV: -96.9 CM/SEC
BH CV XLRA MEAS RIGHT ICA/CCA RATIO: -1.12
BH CV XLRA MEAS RIGHT MID ICA EDV: -32.4 CM/SEC
BH CV XLRA MEAS RIGHT MID ICA PSV: -104 CM/SEC
BH CV XLRA MEAS RIGHT PROX CCA EDV: 26.5 CM/SEC
BH CV XLRA MEAS RIGHT PROX CCA PSV: 160 CM/SEC
BH CV XLRA MEAS RIGHT PROX ECA EDV: -23.6 CM/SEC
BH CV XLRA MEAS RIGHT PROX ECA PSV: -147 CM/SEC
BH CV XLRA MEAS RIGHT PROX ICA EDV: -33.4 CM/SEC
BH CV XLRA MEAS RIGHT PROX ICA PSV: -113 CM/SEC
BH CV XLRA MEAS RIGHT PROX SCLA PSV: 325 CM/SEC
BH CV XLRA MEAS RIGHT VERTEBRAL A EDV: 24.2 CM/SEC
BH CV XLRA MEAS RIGHT VERTEBRAL A PSV: 135 CM/SEC
LEFT ARM BP: NORMAL MMHG
MAXIMAL PREDICTED HEART RATE: 147 BPM
RIGHT ARM BP: NORMAL MMHG
STRESS TARGET HR: 125 BPM

## 2022-08-24 PROCEDURE — 93880 EXTRACRANIAL BILAT STUDY: CPT

## 2022-09-12 ENCOUNTER — HOSPITAL ENCOUNTER (OUTPATIENT)
Dept: CARDIOLOGY | Facility: HOSPITAL | Age: 72
Discharge: HOME | End: 2022-09-12
Admitting: FAMILY MEDICINE

## 2022-09-12 VITALS
BODY MASS INDEX: 34.15 KG/M2 | OXYGEN SATURATION: 93 % | HEIGHT: 64 IN | DIASTOLIC BLOOD PRESSURE: 60 MMHG | WEIGHT: 200 LBS | SYSTOLIC BLOOD PRESSURE: 150 MMHG | HEART RATE: 90 BPM

## 2022-09-12 DIAGNOSIS — R60.0 LOWER EXTREMITY EDEMA: ICD-10-CM

## 2022-09-12 LAB
AORTIC ARCH: 2.1 CM
ASCENDING AORTA: 3.5 CM
BH CV ECHO MEAS - ACS: 1.65 CM
BH CV ECHO MEAS - AO MAX PG: 10.1 MMHG
BH CV ECHO MEAS - AO MEAN PG: 5.6 MMHG
BH CV ECHO MEAS - AO ROOT DIAM: 3.3 CM
BH CV ECHO MEAS - AO V2 MAX: 158.8 CM/SEC
BH CV ECHO MEAS - AO V2 VTI: 33.1 CM
BH CV ECHO MEAS - AVA(I,D): 2.34 CM2
BH CV ECHO MEAS - EDV(CUBED): 53.1 ML
BH CV ECHO MEAS - EDV(MOD-SP2): 80 ML
BH CV ECHO MEAS - EDV(MOD-SP4): 89 ML
BH CV ECHO MEAS - EF(MOD-BP): 61.8 %
BH CV ECHO MEAS - EF(MOD-SP2): 61.3 %
BH CV ECHO MEAS - EF(MOD-SP4): 62.9 %
BH CV ECHO MEAS - ESV(CUBED): 15 ML
BH CV ECHO MEAS - ESV(MOD-SP2): 31 ML
BH CV ECHO MEAS - ESV(MOD-SP4): 33 ML
BH CV ECHO MEAS - FS: 34.3 %
BH CV ECHO MEAS - IVS/LVPW: 1.04 CM
BH CV ECHO MEAS - IVSD: 1.23 CM
BH CV ECHO MEAS - LAT PEAK E' VEL: 8.1 CM/SEC
BH CV ECHO MEAS - LV DIASTOLIC VOL/BSA (35-75): 45.5 CM2
BH CV ECHO MEAS - LV MASS(C)D: 151.3 GRAMS
BH CV ECHO MEAS - LV MAX PG: 6.4 MMHG
BH CV ECHO MEAS - LV MEAN PG: 3.2 MMHG
BH CV ECHO MEAS - LV SYSTOLIC VOL/BSA (12-30): 16.9 CM2
BH CV ECHO MEAS - LV V1 MAX: 126.1 CM/SEC
BH CV ECHO MEAS - LV V1 VTI: 26.4 CM
BH CV ECHO MEAS - LVIDD: 3.8 CM
BH CV ECHO MEAS - LVIDS: 2.47 CM
BH CV ECHO MEAS - LVOT AREA: 2.9 CM2
BH CV ECHO MEAS - LVOT DIAM: 1.93 CM
BH CV ECHO MEAS - LVPWD: 1.18 CM
BH CV ECHO MEAS - MED PEAK E' VEL: 11.9 CM/SEC
BH CV ECHO MEAS - MV A DUR: 0.12 SEC
BH CV ECHO MEAS - MV A MAX VEL: 90.8 CM/SEC
BH CV ECHO MEAS - MV DEC SLOPE: 319.2 CM/SEC2
BH CV ECHO MEAS - MV DEC TIME: 0.26 MSEC
BH CV ECHO MEAS - MV E MAX VEL: 77.1 CM/SEC
BH CV ECHO MEAS - MV E/A: 0.85
BH CV ECHO MEAS - MV MAX PG: 3.1 MMHG
BH CV ECHO MEAS - MV MEAN PG: 1.64 MMHG
BH CV ECHO MEAS - MV P1/2T: 73.5 MSEC
BH CV ECHO MEAS - MV V2 VTI: 20.6 CM
BH CV ECHO MEAS - MVA(P1/2T): 3 CM2
BH CV ECHO MEAS - MVA(VTI): 3.8 CM2
BH CV ECHO MEAS - PA ACC TIME: 0.11 SEC
BH CV ECHO MEAS - PA PR(ACCEL): 29.1 MMHG
BH CV ECHO MEAS - PA V2 MAX: 107.2 CM/SEC
BH CV ECHO MEAS - PULM A REVS DUR: 0.12 SEC
BH CV ECHO MEAS - PULM A REVS VEL: 32.8 CM/SEC
BH CV ECHO MEAS - PULM DIAS VEL: 36.2 CM/SEC
BH CV ECHO MEAS - PULM S/D: 1.42
BH CV ECHO MEAS - PULM SYS VEL: 51.4 CM/SEC
BH CV ECHO MEAS - QP/QS: 0.63
BH CV ECHO MEAS - RAP SYSTOLE: 3 MMHG
BH CV ECHO MEAS - RV MAX PG: 2.46 MMHG
BH CV ECHO MEAS - RV V1 MAX: 78.4 CM/SEC
BH CV ECHO MEAS - RV V1 VTI: 15 CM
BH CV ECHO MEAS - RVOT DIAM: 2.04 CM
BH CV ECHO MEAS - RVSP: 25.1 MMHG
BH CV ECHO MEAS - SI(MOD-SP2): 25.1 ML/M2
BH CV ECHO MEAS - SI(MOD-SP4): 28.6 ML/M2
BH CV ECHO MEAS - SUP REN AO DIAM: 1.9 CM
BH CV ECHO MEAS - SV(LVOT): 77.6 ML
BH CV ECHO MEAS - SV(MOD-SP2): 49 ML
BH CV ECHO MEAS - SV(MOD-SP4): 56 ML
BH CV ECHO MEAS - SV(RVOT): 49.2 ML
BH CV ECHO MEAS - TAPSE (>1.6): 2.9 CM
BH CV ECHO MEAS - TR MAX PG: 22.1 MMHG
BH CV ECHO MEAS - TR MAX VEL: 234.8 CM/SEC
BH CV ECHO MEASUREMENTS AVERAGE E/E' RATIO: 7.71
BH CV XLRA - RV BASE: 2.6 CM
BH CV XLRA - RV LENGTH: 6.6 CM
BH CV XLRA - RV MID: 2.5 CM
BH CV XLRA - TDI S': 14.3 CM/SEC
LEFT ATRIUM VOLUME INDEX: 13.3 ML/M2
LV EF 2D ECHO EST: 62 %
MAXIMAL PREDICTED HEART RATE: 147 BPM
SINUS: 2.8 CM
STJ: 3.4 CM
STRESS TARGET HR: 125 BPM

## 2022-09-12 PROCEDURE — 93306 TTE W/DOPPLER COMPLETE: CPT

## 2022-09-12 PROCEDURE — 25010000002 PERFLUTREN (DEFINITY) 8.476 MG IN SODIUM CHLORIDE (PF) 0.9 % 10 ML INJECTION: Performed by: FAMILY MEDICINE

## 2022-09-12 PROCEDURE — 93306 TTE W/DOPPLER COMPLETE: CPT | Performed by: INTERNAL MEDICINE

## 2022-09-12 RX ADMIN — PERFLUTREN 1.5 ML: 6.52 INJECTION, SUSPENSION INTRAVENOUS at 12:40

## 2022-11-09 ENCOUNTER — APPOINTMENT (OUTPATIENT)
Dept: GENERAL RADIOLOGY | Facility: HOSPITAL | Age: 72
End: 2022-11-09

## 2022-11-09 ENCOUNTER — HOSPITAL ENCOUNTER (EMERGENCY)
Facility: HOSPITAL | Age: 72
Discharge: HOME | End: 2022-11-09
Attending: EMERGENCY MEDICINE | Admitting: EMERGENCY MEDICINE

## 2022-11-09 VITALS
OXYGEN SATURATION: 100 % | SYSTOLIC BLOOD PRESSURE: 150 MMHG | TEMPERATURE: 97.8 F | RESPIRATION RATE: 18 BRPM | DIASTOLIC BLOOD PRESSURE: 72 MMHG | HEART RATE: 78 BPM

## 2022-11-09 DIAGNOSIS — M25.422 EFFUSION OF LEFT ELBOW: ICD-10-CM

## 2022-11-09 DIAGNOSIS — S63.502A LEFT WRIST SPRAIN, INITIAL ENCOUNTER: Primary | ICD-10-CM

## 2022-11-09 PROCEDURE — 73110 X-RAY EXAM OF WRIST: CPT

## 2022-11-09 PROCEDURE — 73070 X-RAY EXAM OF ELBOW: CPT

## 2022-11-09 PROCEDURE — 99283 EMERGENCY DEPT VISIT LOW MDM: CPT

## 2022-11-09 NOTE — DISCHARGE INSTRUCTIONS
Rest ice and elevate.  You can take Tylenol for pain.  Follow-up with one of the orthopedist listed or one of your choice, call them tomorrow to schedule follow-up within 1 week.

## 2022-11-09 NOTE — ED TRIAGE NOTES
Patient to ed from  with complaints of left wrist/arm pain after she tripped and fell. Patient denies LOC or hitting head.

## 2022-11-09 NOTE — ED PROVIDER NOTES
Pt presents to the ED c/o  left wrist and elbow pain after she tripped and fell over a curb earlier today.  Endorses intact though with discomfort.  Seems to be worse with supination and pronation of the forearm.  No numbness or weakness noted.     On exam,   General: No acute distress, nontoxic  HEENT: EOMI  Pulm: Symmetric chest rise, nonlabored breathing  CV: Regular rate and rhythm  GI: Nondistended  MSK: No deformity, tenderness at the radial aspect of the wrist without specific tenderness in the elbow region, range of motion of the wrist and elbow intact though with pain.  Aggravation with supination and pronation of the forearm.  Skin: Warm, dry  Neuro: Awake, alert, oriented x 4, neurovascular intact in the distal left upper extremity, moving all extremities, no focal deficits  Psych: Calm, cooperative    Vital signs and nursing notes reviewed.         N95, protective eye goggles, and gloves used during this encounter. Patient in surgical mask.      Plan:  No acute fracture noted may be a small left elbow effusion, most of the pain is at the wrist, will give a Velcro wrist splint and a sling with recommendations for outpatient orthopedic follow-up, supportive care measures.  ED return for worsening symptoms as needed.       Attestation:  The MAY and I have discussed this patient's history, physical exam, and treatment plan.  I have reviewed the documentation and personally had a face to face interaction with the patient. I affirm the documentation and agree with the treatment and plan.  The attached note describes my personal findings.            Jim Roach MD  11/09/22 [id number]

## 2022-11-09 NOTE — ED PROVIDER NOTES
EMERGENCY DEPARTMENT ENCOUNTER    Room Number:  B04/04  Date seen:  11/9/2022  Time seen: 17:19 EST  PCP: Grant Layne MD  Historian: patient      HPI:  Chief Complaint: left arm injury    A complete HPI/ROS/PMH/PSH/SH/FH are unobtainable due to: none    Context: Ellen Adamson is a 72 y.o. female who presents to the ED for evaluation of pain in the left wrist and elbow after suffering a mechanical fall earlier today.  She fell onto her outstretched hand and has had pain and swelling in the wrist as well as some pain in the left elbow that she describes primarily with supination since.  She denies any numbness tingling or weakness, no head injury neck pain or other complaints at this time.        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) 02/28/2020   • Dyspnea on exertion 02/28/2020   • Thrombocytosis 02/28/2020   • Anemia 02/28/2020   • Dyspnea 02/29/2020     Resolved Ambulatory Problems     Diagnosis Date Noted   • No Resolved Ambulatory Problems     Past Medical History:   Diagnosis Date   • Anxiety and depression    • CKD (chronic kidney disease) 2019   • COPD (chronic obstructive pulmonary disease) ([facility])    • GERD (gastroesophageal reflux disease)    • Lumbar stenosis    • Right hip pain          PAST SURGICAL HISTORY  Past Surgical History:   Procedure Laterality Date   • ANKLE SURGERY      RIGHT   • APPENDECTOMY     • CHOLECYSTECTOMY     • COLONOSCOPY     • ELBOW PROCEDURE Left    • ENDOSCOPY     • HYSTERECTOMY     •  TOTAL HIP ARTHROPLASTY Right 2/10/2020    Procedure: TOTAL HIP ARTHROPLASTY ANTERIOR WITH HANA TABLE;  Surgeon: Jann Khan MD;  Location: [facility] MAIN OR;  Service: Orthopedics;  Laterality: Right;         FAMILY HISTORY  Family History   Problem Relation Age of Onset   • No Known Problems Mother    • No Known Problems Father    • Malig Hyperthermia Neg Hx          SOCIAL HISTORY  Social History     Socioeconomic History   • Marital status:    Tobacco Use   • Smoking status: Former     Packs/day: 1.00     Years: 25.00     Pack years: 25.00     Types: Cigarettes     Quit date: 10/16/2019     Years since quitting: 3.0   Substance and Sexual Activity   • Alcohol use: Not Currently   • Drug use: Never   • Sexual activity: Defer         ALLERGIES  Iodine and Shellfish-derived products        REVIEW OF SYSTEMS  Review of Systems     All systems reviewed and negative except for those discussed in HPI.       PHYSICAL EXAM  ED Triage Vitals [11/09/22 1608]   Temp Heart Rate Resp BP SpO2   97.8 °F (36.6 °C) 112 18 -- 96 %      Temp src Heart Rate Source Patient Position BP Location FiO2 (%)   Tympanic Monitor -- -- --         GENERAL: not distressed  HENT: atraumatic  EYES: no scleral icterus  CV:  regular rate  RESPIRATORY: normal effort  ABDOMEN: Nondistended  MUSCULOSKELETAL: no deformity.  There is tenderness and trace edema to the distal radius and ulna.  No snuffbox tenderness.  No tenderness to the hand.  No focal tenderness to the elbow but she does have pain directly in the elbow with supination.  Sensation and motor function are intact in radial ulnar median nerve distributions cap refill brisk, radial pulse 2+.  She is full flexion extension of the elbow.  NEURO: alert, moves all extremities, follows commands  SKIN: warm, dry    Vital signs and nursing notes reviewed.          RADIOLOGY  XR Wrist 3+ View Left, XR ELBOW 2 VIEW LEFT    Result Date: 11/9/2022  Narrative: XR ELBOW 2 VW LEFT-, XR WRIST 3+ VW  LEFT-clinical: Fell with pain  LEFT ELBOW FINDINGS: There are 2 surgical screws crossing the lateral humeral epicondyle, no loosening demonstrated. There is bone hypertrophy demonstrated along the medial humeral epicondyle, the appearance suggests old epicondylitis. There could be a joint effusion, this is worrisome for potential occult fracture. No grossly displaced fracture seen. There is narrowing of the elbow joint. There are punctate dystrophic calcifications demonstrated along the lateral and anterior joint bursa. The remainder is unremarkable  LEFT WRIST FINDINGS: There is vague calcification demonstrated about the joint, suspect chondrocalcinosis. These worrisome for calcium pyrophosphate deposition disease. There is radiocarpal, intercarpal and first carpometacarpal joint narrowing. The distal radius and ulna have a satisfactory appearance. No carpal bone fracture or dislocation. The remainder is unremarkable.  This report was finalized on 11/9/2022 5:06 PM by Dr. Rupesh Mi M.D.        I ordered the above noted radiological studies. Reviewed by me and discussed with radiologist.  See dictation for official radiology interpretation.    PROCEDURES  Procedures        MEDICATIONS GIVEN IN ER  Medications - No data to display          PROGRESS AND CONSULTS    DDX includes but not limited to fracture, contusion, sprain         My interpretation of the left elbow and wrist x-rays is no acute fracture    I reassessed the patient, discussed her x-ray findings.  No acute fracture appreciated, possible left elbow effusion.  She has tenderness pain and swelling in the left wrist.  We will provide a sling to rest the elbow and a Velcro cock-up splint for the left wrist.  I recommended rest ice elevation, follow-up with orthopedics, Tylenol as needed for pain per package instructions.  She is agreeable with the plan and stable for discharge.      Patient was placed in face mask in first look. Patient was wearing  facemask each time I entered the room and throughout our encounter. I wore protective equipment throughout this patient encounter including a face mask, eye shield and gloves. Hand hygiene was performed before donning protective equipment and after removal when leaving the room.        DIAGNOSIS  Final diagnoses:   Left wrist sprain, initial encounter   Effusion of left elbow         Follow Up:  Ronald Gorman MD  [address]  [place] [address]  [phone]          Gatito Nunes MD  [address]  [place] [address]  [phone]            RX:     Medication List      No changes were made to your prescriptions during this visit.           Latest Documented Vital Signs:  As of 20:12 EST  BP- 150/72 HR- 78 Temp- 97.8 °F (36.6 °C) (Tympanic) O2 sat- 100%       Sridevi Zelaya PA  11/09/22 2012

## 2022-11-09 NOTE — ED NOTES
Wrist split and sling applied, pt educated and told to rest, elevate, use compression and ice on affected limp. Pt verbalized understanding

## 2023-04-24 ENCOUNTER — PATIENT ROUNDING (BHMG ONLY) (OUTPATIENT)
Dept: FAMILY MEDICINE CLINIC | Facility: CLINIC | Age: 73
End: 2023-04-24

## 2023-04-24 ENCOUNTER — OFFICE VISIT (OUTPATIENT)
Dept: FAMILY MEDICINE CLINIC | Facility: CLINIC | Age: 73
End: 2023-04-24
Payer: MEDICARE

## 2023-04-24 VITALS
RESPIRATION RATE: 16 BRPM | SYSTOLIC BLOOD PRESSURE: 122 MMHG | TEMPERATURE: 97.3 F | OXYGEN SATURATION: 98 % | HEIGHT: 64 IN | BODY MASS INDEX: 33.63 KG/M2 | HEART RATE: 91 BPM | WEIGHT: 197 LBS | DIASTOLIC BLOOD PRESSURE: 78 MMHG

## 2023-04-24 DIAGNOSIS — Z11.59 ENCOUNTER FOR HCV SCREENING TEST FOR LOW RISK PATIENT: ICD-10-CM

## 2023-04-24 DIAGNOSIS — E78.2 MIXED HYPERLIPIDEMIA: ICD-10-CM

## 2023-04-24 DIAGNOSIS — Z12.2 ENCOUNTER FOR SCREENING FOR LUNG CANCER: ICD-10-CM

## 2023-04-24 DIAGNOSIS — R73.03 PREDIABETES: ICD-10-CM

## 2023-04-24 DIAGNOSIS — Z72.0 TOBACCO USE: ICD-10-CM

## 2023-04-24 DIAGNOSIS — Z23 ENCOUNTER FOR IMMUNIZATION: ICD-10-CM

## 2023-04-24 DIAGNOSIS — F32.A ANXIETY AND DEPRESSION: ICD-10-CM

## 2023-04-24 DIAGNOSIS — E66.9 OBESITY (BMI 30.0-34.9): Primary | ICD-10-CM

## 2023-04-24 DIAGNOSIS — F41.9 ANXIETY AND DEPRESSION: ICD-10-CM

## 2023-04-24 DIAGNOSIS — K21.9 GERD WITHOUT ESOPHAGITIS: ICD-10-CM

## 2023-04-24 DIAGNOSIS — D50.9 IRON DEFICIENCY ANEMIA, UNSPECIFIED IRON DEFICIENCY ANEMIA TYPE: ICD-10-CM

## 2023-04-24 DIAGNOSIS — N18.31 STAGE 3A CHRONIC KIDNEY DISEASE: ICD-10-CM

## 2023-04-24 DIAGNOSIS — Z87.891 PERSONAL HISTORY OF NICOTINE DEPENDENCE: ICD-10-CM

## 2023-04-24 DIAGNOSIS — Z78.0 POSTMENOPAUSAL: ICD-10-CM

## 2023-04-24 DIAGNOSIS — M35.0C SJOGREN'S SYNDROME WITH DENTAL INVOLVEMENT: ICD-10-CM

## 2023-04-24 PROBLEM — J44.9 COPD WITH HYPOXIA: Status: RESOLVED | Noted: 2020-02-17 | Resolved: 2023-04-24

## 2023-04-24 PROBLEM — R06.09 DYSPNEA ON EXERTION: Status: RESOLVED | Noted: 2020-02-28 | Resolved: 2023-04-24

## 2023-04-24 PROBLEM — R06.00 DYSPNEA: Status: RESOLVED | Noted: 2020-02-29 | Resolved: 2023-04-24

## 2023-04-24 PROBLEM — R33.8 ACUTE URINARY RETENTION: Status: RESOLVED | Noted: 2020-02-18 | Resolved: 2023-04-24

## 2023-04-24 PROBLEM — D72.829 LEUKOCYTOSIS: Status: RESOLVED | Noted: 2020-02-17 | Resolved: 2023-04-24

## 2023-04-24 PROBLEM — J18.9 PNEUMONIA OF BOTH LUNGS DUE TO INFECTIOUS ORGANISM: Status: RESOLVED | Noted: 2020-02-17 | Resolved: 2023-04-24

## 2023-04-24 PROBLEM — E66.811 OBESITY (BMI 30.0-34.9): Status: ACTIVE | Noted: 2023-04-24

## 2023-04-24 PROBLEM — R09.02 COPD WITH HYPOXIA: Status: RESOLVED | Noted: 2020-02-17 | Resolved: 2023-04-24

## 2023-04-24 RX ORDER — OMEPRAZOLE 20 MG/1
20 CAPSULE, DELAYED RELEASE ORAL DAILY
Qty: 90 CAPSULE | Refills: 3 | Status: SHIPPED | OUTPATIENT
Start: 2023-04-24 | End: 2024-04-18

## 2023-04-24 RX ORDER — MONTELUKAST SODIUM 10 MG/1
10 TABLET ORAL DAILY
COMMUNITY
Start: 2023-02-06 | End: 2023-04-24

## 2023-04-24 RX ORDER — ATORVASTATIN CALCIUM 40 MG/1
TABLET, FILM COATED ORAL DAILY
COMMUNITY

## 2023-04-24 RX ORDER — FAMOTIDINE 40 MG/1
1 TABLET, FILM COATED ORAL DAILY
COMMUNITY
Start: 2023-04-06 | End: 2023-04-24

## 2023-04-24 RX ORDER — FOLIC ACID 1 MG/1
1000 TABLET ORAL DAILY
COMMUNITY
Start: 2023-01-24 | End: 2023-04-24

## 2023-04-24 RX ORDER — BUMETANIDE 1 MG/1
1 TABLET ORAL DAILY PRN
COMMUNITY
Start: 2023-01-24 | End: 2023-04-24

## 2023-04-24 RX ORDER — PRAMIPEXOLE DIHYDROCHLORIDE 0.5 MG/1
1 TABLET ORAL EVERY 12 HOURS SCHEDULED
COMMUNITY
Start: 2023-04-03

## 2023-04-24 RX ORDER — SODIUM BICARBONATE 650 MG/1
TABLET ORAL
COMMUNITY

## 2023-04-24 RX ORDER — CYCLOSPORINE 0.5 MG/ML
EMULSION OPHTHALMIC
COMMUNITY

## 2023-04-24 RX ORDER — DICLOFENAC SODIUM 20 MG/G
SOLUTION TOPICAL
COMMUNITY

## 2023-04-24 NOTE — ASSESSMENT & PLAN NOTE
Lipid abnormalities are newly identified.  Nutritional counseling was provided. and Pharmacotherapy as ordered.  Lipids will be reassessed in 1 year.    On atorvastatin 40mg daily, will check lipid panel. Weight loss through better food choices and regular exercise would be helpful.

## 2023-04-24 NOTE — PROGRESS NOTES
April 24, 2023      Judith Adamson,     I want to officially welcome you to our practice and ask about your recent visit.     Overall were you satisfied with your visit?   YES    Would you recommend our office to friends and family?   YES    Your experience is very important to us.  You may receive an email regarding a survey.  Please take a moment to complete.  This will help us to improve.      I appreciate you taking the time to answer these questions.       Thank you and have a great day.

## 2023-04-24 NOTE — ASSESSMENT & PLAN NOTE
Renal condition is unchanged.  Weight loss.  Regular aerobic exercise.  Stop smoking.  Renal condition will be reassessed in 6 months.    BMP and urine microalbumin/CR today. Discussed avoiding oral NSAIDs.

## 2023-04-24 NOTE — ASSESSMENT & PLAN NOTE
Patient's depression is recurrent and is moderate without psychosis. Their depression is currently active and the condition is unchanged. This will be reassessed in 4 weeks. F/U as described:patient will continue current medication therapy.    Following up with psychiatry later today.

## 2023-04-24 NOTE — ASSESSMENT & PLAN NOTE
Patient's (Body mass index is 33.8 kg/m².) indicates that they are obese (BMI >30) with health conditions that include dyslipidemias . Weight is newly identified. BMI  is above average; BMI management plan is completed. We discussed portion control and increasing exercise.

## 2023-04-24 NOTE — PROGRESS NOTES
"        Timothy Carson MD  Internal Medicine  [phone] (office)             04/24/2023    Patient Information  Ellen Adamson                                                                                          [facility] [facility] [address]  1950        Chief Complaint   Patient presents with   • Heartburn     NP TO BE EST          History of Present Illness:    Ellen Adamson is a 72 y.o. female who presents to the office to establish care with new PCP.     She smokes 1ppd and is considering quitting. Previously quit \"cold turkey\" for one year. She's amenable to lung cancer screening CT.     Reports GERD previously well controlled on PPI, but she was switched to famotidine for unclear reasons.     Reports screening colonoscopy a few months ago and needs repeat in 3 years.     Due for PCV-20 and amenable to receiving that today.     Wishes to lose weight, doesn't exercise.         Allergies   Allergen Reactions   • Iodine Nausea And Vomiting   • Shellfish-Derived Products Nausea And Vomiting           Current Outpatient Medications:   •  ALPRAZolam (XANAX) 1 MG tablet, Take 1 tablet by mouth 4 (Four) Times a Day As Needed., Disp: , Rfl:   •  atorvastatin (LIPITOR) 40 MG tablet, Take  by mouth Daily., Disp: , Rfl:   •  Cholecalciferol (VITAMIN D3 PO), Take  by mouth., Disp: , Rfl:   •  cycloSPORINE (RESTASIS) 0.05 % ophthalmic emulsion, Restasis 0.05 % eye drops in a dropperette  INSTILL 1 DROP INTO AFFECTED EYE(S) BY OPHTHALMIC ROUTE EVERY 12 HOURS, Disp: , Rfl:   •  Diclofenac Sodium 2 % solution, Apply  topically to the appropriate area as directed., Disp: , Rfl:   •  pramipexole (MIRAPEX) 0.5 MG tablet, Take 1 tablet by mouth Every 12 (Twelve) Hours., Disp: , Rfl:   •  sodium bicarbonate 650 MG tablet, sodium bicarbonate 650 mg tablet  TAKE 1 TABLET BY MOUTH TWICE DAILY, Disp: , Rfl:   •  vilazodone (VIIBRYD) 40 MG tablet tablet, Take 1 tablet by mouth Every Morning., Disp: , Rfl: " "  •  omeprazole (priLOSEC) 20 MG capsule, Take 1 capsule by mouth Daily for 360 days., Disp: 90 capsule, Rfl: 3      Social History     Socioeconomic History   • Marital status:      Spouse name: Ramírez   • Number of children: 3   Tobacco Use   • Smoking status: Former     Packs/day: 1.00     Years: 25.00     Pack years: 25.00     Types: Cigarettes     Quit date: 10/16/2019     Years since quitting: 3.5   Substance and Sexual Activity   • Alcohol use: Not Currently   • Drug use: Never   • Sexual activity: Defer         Vitals:    04/24/23 1331   BP: 122/78   BP Location: Left arm   Patient Position: Sitting   Cuff Size: Adult   Pulse: 91   Resp: 16   Temp: 97.3 °F (36.3 °C)   TempSrc: Temporal   SpO2: 98%   Weight: 89.4 kg (197 lb)   Height: 162.6 cm (64.02\")      Wt Readings from Last 3 Encounters:   04/24/23 89.4 kg (197 lb)   09/12/22 90.7 kg (200 lb)   02/28/20 90.8 kg (200 lb 1.6 oz)     Body mass index is 33.8 kg/m².      Physical Exam  Constitutional:       General: She is not in acute distress.     Appearance: Normal appearance. She is obese.   Pulmonary:      Effort: Pulmonary effort is normal.   Neurological:      Mental Status: She is alert and oriented to person, place, and time.   Psychiatric:         Mood and Affect: Mood normal.         Behavior: Behavior normal.            Lab/other results:    Reviewed previous CBCs, iron panel, ferritin, CMP. CTA chest.     Assessment/Plan:    Diagnoses and all orders for this visit:    1. Obesity (BMI 30.0-34.9) (Primary)  Assessment & Plan:  Patient's (Body mass index is 33.8 kg/m².) indicates that they are obese (BMI >30) with health conditions that include dyslipidemias . Weight is newly identified. BMI  is above average; BMI management plan is completed. We discussed portion control and increasing exercise.     Orders:  -     Lipid Panel    2. Encounter for HCV screening test for low risk patient  -     Hepatitis C Virus Ab Cascade    3. Iron deficiency " anemia, unspecified iron deficiency anemia type  Assessment & Plan:  Noted during 2020 labs and consistent with CHEYANNE. Checking CBC and iron studies today.     Orders:  -     CBC & Differential  -     Ferritin  -     Iron Profile    4. Mixed hyperlipidemia  Assessment & Plan:  Lipid abnormalities are newly identified.  Nutritional counseling was provided. and Pharmacotherapy as ordered.  Lipids will be reassessed in 1 year.    On atorvastatin 40mg daily, will check lipid panel. Weight loss through better food choices and regular exercise would be helpful.       5. Prediabetes  Assessment & Plan:  A1c 6.2% a few years ago. Checking CMP and A1c again today. Encouraged weight loss.     Orders:  -     Hemoglobin A1c    6. Stage 3a chronic kidney disease  Assessment & Plan:  Renal condition is unchanged.  Weight loss.  Regular aerobic exercise.  Stop smoking.  Renal condition will be reassessed in 6 months.    BMP and urine microalbumin/CR today. Discussed avoiding oral NSAIDs.     Orders:  -     Comprehensive Metabolic Panel  -     Microalbumin / Creatinine Urine Ratio - Urine, Clean Catch    7. GERD without esophagitis  Assessment & Plan:  Discontinue famotidine in favor of omeprazole, per patient request.     Orders:  -     omeprazole (priLOSEC) 20 MG capsule; Take 1 capsule by mouth Daily for 360 days.  Dispense: 90 capsule; Refill: 3    8. Tobacco use  -     CT Chest Low Dose Wo; Future  -     Pneumococcal Conjugate Vaccine 20-Valent (PCV20)    9. Encounter for screening for lung cancer  -     CT Chest Low Dose Wo; Future    10. Personal history of nicotine dependence  -     CT Chest Low Dose Wo; Future    11. Encounter for immunization  -     Pneumococcal Conjugate Vaccine 20-Valent (PCV20)    12. Postmenopausal  -     DEXA Bone Density Axial    13. Anxiety and depression  Assessment & Plan:  Patient's depression is recurrent and is moderate without psychosis. Their depression is currently active and the condition is  unchanged. This will be reassessed in 4 weeks. F/U as described:patient will continue current medication therapy.    Following up with psychiatry later today.       14. Sjogren's syndrome with dental involvement  Assessment & Plan:  All teeth have been removed. Using artificial tears and candies (sugar free) PRN.

## 2023-04-25 DIAGNOSIS — E08.65 DIABETES MELLITUS DUE TO UNDERLYING CONDITION WITH HYPERGLYCEMIA, WITHOUT LONG-TERM CURRENT USE OF INSULIN: ICD-10-CM

## 2023-04-25 DIAGNOSIS — E66.09 CLASS 1 OBESITY DUE TO EXCESS CALORIES WITH SERIOUS COMORBIDITY AND BODY MASS INDEX (BMI) OF 33.0 TO 33.9 IN ADULT: Primary | ICD-10-CM

## 2023-04-25 LAB
ALBUMIN SERPL-MCNC: 4.3 G/DL (ref 3.7–4.7)
ALBUMIN/CREAT UR: 8 MG/G CREAT (ref 0–29)
ALBUMIN/GLOB SERPL: 1.6 {RATIO} (ref 1.2–2.2)
ALP SERPL-CCNC: 176 IU/L (ref 44–121)
ALT SERPL-CCNC: 19 IU/L (ref 0–32)
AST SERPL-CCNC: 14 IU/L (ref 0–40)
BASOPHILS # BLD AUTO: 0.1 X10E3/UL (ref 0–0.2)
BASOPHILS NFR BLD AUTO: 0 %
BILIRUB SERPL-MCNC: 0.4 MG/DL (ref 0–1.2)
BUN SERPL-MCNC: 25 MG/DL (ref 8–27)
BUN/CREAT SERPL: 25 (ref 12–28)
CALCIUM SERPL-MCNC: 10.2 MG/DL (ref 8.7–10.3)
CHLORIDE SERPL-SCNC: 102 MMOL/L (ref 96–106)
CHOLEST SERPL-MCNC: 234 MG/DL (ref 100–199)
CO2 SERPL-SCNC: 22 MMOL/L (ref 20–29)
CREAT SERPL-MCNC: 1 MG/DL (ref 0.57–1)
CREAT UR-MCNC: 43.7 MG/DL
EGFRCR SERPLBLD CKD-EPI 2021: 59 ML/MIN/1.73
EOSINOPHIL # BLD AUTO: 0 X10E3/UL (ref 0–0.4)
EOSINOPHIL NFR BLD AUTO: 0 %
ERYTHROCYTE [DISTWIDTH] IN BLOOD BY AUTOMATED COUNT: 13.9 % (ref 11.7–15.4)
FERRITIN SERPL-MCNC: 53 NG/ML (ref 15–150)
GLOBULIN SER CALC-MCNC: 2.7 G/DL (ref 1.5–4.5)
GLUCOSE SERPL-MCNC: 106 MG/DL (ref 70–99)
HBA1C MFR BLD: 6.5 % (ref 4.8–5.6)
HCT VFR BLD AUTO: 41 % (ref 34–46.6)
HCV AB SERPL QL IA: NORMAL
HCV IGG SERPL QL IA: NON REACTIVE
HDLC SERPL-MCNC: 70 MG/DL
HGB BLD-MCNC: 13.7 G/DL (ref 11.1–15.9)
IMM GRANULOCYTES # BLD AUTO: 0.5 X10E3/UL (ref 0–0.1)
IMM GRANULOCYTES NFR BLD AUTO: 3 %
IRON SATN MFR SERPL: 36 % (ref 15–55)
IRON SERPL-MCNC: 140 UG/DL (ref 27–139)
LDLC SERPL CALC-MCNC: 127 MG/DL (ref 0–99)
LYMPHOCYTES # BLD AUTO: 3.1 X10E3/UL (ref 0.7–3.1)
LYMPHOCYTES NFR BLD AUTO: 16 %
MCH RBC QN AUTO: 31.4 PG (ref 26.6–33)
MCHC RBC AUTO-ENTMCNC: 33.4 G/DL (ref 31.5–35.7)
MCV RBC AUTO: 94 FL (ref 79–97)
MICROALBUMIN UR-MCNC: 3.3 UG/ML
MONOCYTES # BLD AUTO: 1.4 X10E3/UL (ref 0.1–0.9)
MONOCYTES NFR BLD AUTO: 7 %
NEUTROPHILS # BLD AUTO: 14.7 X10E3/UL (ref 1.4–7)
NEUTROPHILS NFR BLD AUTO: 74 %
PLATELET # BLD AUTO: 401 X10E3/UL (ref 150–450)
POTASSIUM SERPL-SCNC: 4.8 MMOL/L (ref 3.5–5.2)
PROT SERPL-MCNC: 7 G/DL (ref 6–8.5)
RBC # BLD AUTO: 4.36 X10E6/UL (ref 3.77–5.28)
SODIUM SERPL-SCNC: 141 MMOL/L (ref 134–144)
TIBC SERPL-MCNC: 389 UG/DL (ref 250–450)
TRIGL SERPL-MCNC: 214 MG/DL (ref 0–149)
UIBC SERPL-MCNC: 249 UG/DL (ref 118–369)
VLDLC SERPL CALC-MCNC: 37 MG/DL (ref 5–40)
WBC # BLD AUTO: 19.7 X10E3/UL (ref 3.4–10.8)

## 2023-04-25 NOTE — PROGRESS NOTES
I tried to contact patient about her abnormal labs results, but had to leave a message. Please reach out tomorrow and advise patient that her cholesterol remains high and I recommend doubling dose of atorvastatin (to 80mg daily). Her blood sugar is also very high and we'll need to repeat A1c prior to visit next month. I've ordered this. Her white blood cell count is high and I believe this is due to her smoking. Weight loss and smoking cessation are imperative. Thanks.

## 2023-04-26 ENCOUNTER — TELEPHONE (OUTPATIENT)
Dept: FAMILY MEDICINE CLINIC | Facility: CLINIC | Age: 73
End: 2023-04-26
Payer: MEDICARE

## 2023-05-09 ENCOUNTER — OFFICE VISIT (OUTPATIENT)
Dept: FAMILY MEDICINE CLINIC | Facility: CLINIC | Age: 73
End: 2023-05-09
Payer: MEDICARE

## 2023-05-09 VITALS
BODY MASS INDEX: 34.31 KG/M2 | TEMPERATURE: 96.8 F | RESPIRATION RATE: 16 BRPM | DIASTOLIC BLOOD PRESSURE: 62 MMHG | SYSTOLIC BLOOD PRESSURE: 128 MMHG | WEIGHT: 201 LBS | OXYGEN SATURATION: 97 % | HEART RATE: 68 BPM | HEIGHT: 64 IN

## 2023-05-09 DIAGNOSIS — R60.9 DEPENDENT EDEMA: Primary | ICD-10-CM

## 2023-05-09 PROBLEM — M54.14 THORACIC NEURITIS: Status: ACTIVE | Noted: 2022-08-01

## 2023-05-09 PROBLEM — G89.4 CHRONIC PAIN DISORDER: Status: ACTIVE | Noted: 2022-07-29

## 2023-05-09 PROBLEM — R61 HYPERHIDROSIS: Status: ACTIVE | Noted: 2021-06-10

## 2023-05-09 PROBLEM — E03.9 HYPOTHYROID: Status: ACTIVE | Noted: 2017-11-13

## 2023-05-09 PROBLEM — J45.30 MILD PERSISTENT ASTHMA WITHOUT COMPLICATION: Status: ACTIVE | Noted: 2023-01-25

## 2023-05-09 PROBLEM — K22.2 STRICTURE OF ESOPHAGUS: Status: ACTIVE | Noted: 2020-09-17

## 2023-05-09 PROBLEM — M25.571 ARTHRALGIA OF RIGHT ANKLE: Status: ACTIVE | Noted: 2022-06-06

## 2023-05-09 PROBLEM — K27.9 PEPTIC ULCER: Status: ACTIVE | Noted: 2022-07-01

## 2023-05-09 PROBLEM — F32.0 MILD MAJOR DEPRESSION: Status: ACTIVE | Noted: 2017-10-09

## 2023-05-09 PROBLEM — M51.369 DEGENERATION OF LUMBAR INTERVERTEBRAL DISC: Status: ACTIVE | Noted: 2022-07-29

## 2023-05-09 PROBLEM — L57.0 ACTINIC KERATOSIS: Status: ACTIVE | Noted: 2023-05-09

## 2023-05-09 PROBLEM — M54.17 LUMBOSACRAL RADICULITIS: Status: ACTIVE | Noted: 2022-07-01

## 2023-05-09 PROBLEM — J30.2 SEASONAL ALLERGIC RHINITIS: Status: ACTIVE | Noted: 2021-06-10

## 2023-05-09 PROBLEM — N28.9 KIDNEY DISEASE: Status: ACTIVE | Noted: 2023-01-25

## 2023-05-09 PROBLEM — R12 HEARTBURN: Status: ACTIVE | Noted: 2023-01-25

## 2023-05-09 PROBLEM — M79.9 DISORDER OF MUSCULOSKELETAL SYSTEM: Status: ACTIVE | Noted: 2022-07-29

## 2023-05-09 PROBLEM — I65.29 CAROTID ARTERY STENOSIS: Status: ACTIVE | Noted: 2022-08-25

## 2023-05-09 PROBLEM — M51.36 DEGENERATION OF LUMBAR INTERVERTEBRAL DISC: Status: ACTIVE | Noted: 2022-07-29

## 2023-05-09 PROBLEM — K29.50 CHRONIC GASTRITIS: Status: ACTIVE | Noted: 2019-12-20

## 2023-05-09 PROCEDURE — 99213 OFFICE O/P EST LOW 20 MIN: CPT | Performed by: NURSE PRACTITIONER

## 2023-05-09 PROCEDURE — 1159F MED LIST DOCD IN RCRD: CPT | Performed by: NURSE PRACTITIONER

## 2023-05-09 PROCEDURE — 1160F RVW MEDS BY RX/DR IN RCRD: CPT | Performed by: NURSE PRACTITIONER

## 2023-05-09 RX ORDER — BUMETANIDE 1 MG/1
TABLET ORAL
COMMUNITY
Start: 2023-05-01

## 2023-05-09 RX ORDER — ZOLPIDEM TARTRATE 12.5 MG/1
TABLET, FILM COATED, EXTENDED RELEASE ORAL
COMMUNITY
Start: 2023-04-27

## 2023-05-09 RX ORDER — FLUOXETINE HYDROCHLORIDE 20 MG/1
1 CAPSULE ORAL DAILY
COMMUNITY
Start: 2023-04-25

## 2023-05-09 NOTE — PROGRESS NOTES
"Subjective     Ellen Adamson is a 72 y.o.. female.     Right ankle surgery and right hip surgery in past. Patient stating she has been up on feet more recently working on outside deck.    Leg Swelling  This is a new problem. Episode onset: 4 days. The problem has been gradually improving. Pertinent negatives include no arthralgias. Exacerbated by: standing/walking. Treatments tried: epsom salt soaks, keep elevated. The treatment provided mild relief.       The following portions of the patient's history were reviewed and updated as appropriate: allergies, current medications, past family history, past medical history, past social history, past surgical history and problem list.    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)     \"IN MY NOSE\"   • CKD (chronic kidney disease) 2019    STAGE 3   • COPD (chronic obstructive pulmonary disease)    • GERD (gastroesophageal reflux disease)    • Lumbar stenosis    • Right hip pain    • Sjogren's syndrome        Past Surgical History:   Procedure Laterality Date   • ANKLE SURGERY      RIGHT   • APPENDECTOMY     • CHOLECYSTECTOMY     • COLONOSCOPY     • ELBOW PROCEDURE Left    • ENDOSCOPY     • HYSTERECTOMY     • TOTAL HIP ARTHROPLASTY Right 2/10/2020    Procedure: TOTAL HIP ARTHROPLASTY ANTERIOR WITH HANA TABLE;  Surgeon: Jann Khan MD;  Location: [facility];  Service: Orthopedics;  Laterality: Right;       Review of Systems   Constitutional: Negative.    Respiratory: Negative.    Cardiovascular: Positive for leg swelling (shari. feet swelling).   Musculoskeletal: Negative.  Negative for arthralgias.       Allergies   Allergen Reactions   • Iodine Nausea And Vomiting   • Shellfish-Derived Products Nausea And Vomiting       Objective     Vitals:    05/09/23 0935   BP: 128/62   Pulse: 68   Resp: 16   Temp: 96.8 °F (36 °C)   SpO2: 97%   Weight: 91.2 kg (201 lb)   Height: " "162.6 cm (64.02\")     Body mass index is 34.48 kg/m².    Physical Exam  Vitals reviewed.   HENT:      Head: Normocephalic.   Eyes:      Pupils: Pupils are equal, round, and reactive to light.   Cardiovascular:      Rate and Rhythm: Normal rate and regular rhythm.      Pulses: Normal pulses.   Pulmonary:      Effort: Pulmonary effort is normal.      Breath sounds: Normal breath sounds.   Musculoskeletal:         General: Normal range of motion.      Right lower leg: Edema (non pitting) present.      Left lower leg: Edema (non pitting) present.   Skin:     General: Skin is warm and dry.   Neurological:      Mental Status: She is alert and oriented to person, place, and time.   Psychiatric:         Behavior: Behavior normal.           Current Outpatient Medications:   •  ALPRAZolam (XANAX) 1 MG tablet, Take 1 tablet by mouth 4 (Four) Times a Day As Needed., Disp: , Rfl:   •  atorvastatin (LIPITOR) 40 MG tablet, Take  by mouth Daily., Disp: , Rfl:   •  bumetanide (BUMEX) 1 MG tablet, , Disp: , Rfl:   •  Cholecalciferol (VITAMIN D3 PO), Take  by mouth., Disp: , Rfl:   •  cycloSPORINE (RESTASIS) 0.05 % ophthalmic emulsion, Restasis 0.05 % eye drops in a dropperette  INSTILL 1 DROP INTO AFFECTED EYE(S) BY OPHTHALMIC ROUTE EVERY 12 HOURS, Disp: , Rfl:   •  Diclofenac Sodium 2 % solution, Apply  topically to the appropriate area as directed., Disp: , Rfl:   •  FLUoxetine (PROzac) 20 MG capsule, Take 1 capsule by mouth Daily., Disp: , Rfl:   •  omeprazole (priLOSEC) 20 MG capsule, Take 1 capsule by mouth Daily for 360 days., Disp: 90 capsule, Rfl: 3  •  pramipexole (MIRAPEX) 0.5 MG tablet, Take 1 tablet by mouth Every 12 (Twelve) Hours., Disp: , Rfl:   •  sodium bicarbonate 650 MG tablet, sodium bicarbonate 650 mg tablet  TAKE 1 TABLET BY MOUTH TWICE DAILY, Disp: , Rfl:   •  vilazodone (VIIBRYD) 40 MG tablet tablet, Take 1 tablet by mouth Every Morning., Disp: , Rfl:   •  zolpidem CR (AMBIEN CR) 12.5 MG CR tablet, , Disp: , " Rfl:       Diagnoses and all orders for this visit:    1. Dependent edema (Primary)        Patient Instructions   Dependent edema: Recommend compression stockings/socks daily, on in am and off at dinner time. Recommend monitoring diet and keeping sodium intake down and drinking plenty of water. Recommend keeping legs elevated for 20 minutes at a time, several times through out day.       Return if symptoms worsen or fail to improve, for Dr. Carson as needed/as recommended.

## 2023-05-09 NOTE — PATIENT INSTRUCTIONS
Dependent edema: Recommend compression stockings/socks daily, on in am and off at dinner time. Recommend monitoring diet and keeping sodium intake down and drinking plenty of water. Recommend keeping legs elevated for 20 minutes at a time, several times through out day.

## 2023-05-10 ENCOUNTER — TELEPHONE (OUTPATIENT)
Dept: FAMILY MEDICINE CLINIC | Facility: CLINIC | Age: 73
End: 2023-05-10

## 2023-05-10 NOTE — TELEPHONE ENCOUNTER
Caller: Ellen Adamosn    Relationship: Self    Best call back number: [phone]    What was the call regarding: PATIENT STATES THE OFFICE CALLED AND HER  ANSWERED, BUT PATIENT WASN'T AVAILABLE AND TOLD HIM TO HAVE PATIENT CALL BACK.     HUB ATTEMPTED TO WARM TRANSFER AND WAS UNSUCCESSFUL

## 2023-05-20 LAB — HBA1C MFR BLD: 6.1 % (ref 4.8–5.6)

## 2023-05-24 ENCOUNTER — OFFICE VISIT (OUTPATIENT)
Dept: FAMILY MEDICINE CLINIC | Facility: CLINIC | Age: 73
End: 2023-05-24
Payer: MEDICARE

## 2023-05-24 VITALS
DIASTOLIC BLOOD PRESSURE: 68 MMHG | OXYGEN SATURATION: 98 % | BODY MASS INDEX: 33.39 KG/M2 | HEART RATE: 79 BPM | HEIGHT: 64 IN | SYSTOLIC BLOOD PRESSURE: 130 MMHG | TEMPERATURE: 94.6 F | WEIGHT: 195.6 LBS

## 2023-05-24 DIAGNOSIS — F17.200 TOBACCO DEPENDENCE: ICD-10-CM

## 2023-05-24 DIAGNOSIS — E66.9 OBESITY (BMI 30.0-34.9): ICD-10-CM

## 2023-05-24 DIAGNOSIS — R73.03 PREDIABETES: ICD-10-CM

## 2023-05-24 DIAGNOSIS — M75.41 IMPINGEMENT SYNDROME OF RIGHT SHOULDER: ICD-10-CM

## 2023-05-24 DIAGNOSIS — E78.2 MIXED HYPERLIPIDEMIA: Primary | ICD-10-CM

## 2023-05-24 PROBLEM — D75.839 THROMBOCYTOSIS: Status: RESOLVED | Noted: 2020-02-28 | Resolved: 2023-05-24

## 2023-05-24 PROBLEM — N28.9 KIDNEY DISEASE: Status: RESOLVED | Noted: 2023-01-25 | Resolved: 2023-05-24

## 2023-05-24 PROBLEM — F32.0 MILD MAJOR DEPRESSION: Status: RESOLVED | Noted: 2017-10-09 | Resolved: 2023-05-24

## 2023-05-24 PROBLEM — D64.9 ANEMIA: Status: RESOLVED | Noted: 2020-02-28 | Resolved: 2023-05-24

## 2023-05-24 RX ORDER — ESCITALOPRAM OXALATE 5 MG/1
TABLET ORAL
COMMUNITY
Start: 2023-05-23

## 2023-05-24 NOTE — ASSESSMENT & PLAN NOTE
Patient's (Body mass index is 33.56 kg/m².) indicates that they are obese (BMI >30) with health conditions that include hypertension, diabetes mellitus and dyslipidemias . Weight is unchanged. BMI  is above average; BMI management plan is completed. We discussed portion control, increasing exercise and joining a fitness center or start home based exercise program.

## 2023-05-24 NOTE — ASSESSMENT & PLAN NOTE
Lipid abnormalities are newly identified.  Nutritional counseling was provided. and Pharmacotherapy as ordered.  Lipids will be reassessed in 1 year.    Increase atorvastatin to 80mg daily.

## 2023-05-24 NOTE — PROGRESS NOTES
Timothy Carson MD  Internal Medicine  [phone] (office)             05/24/2023    Patient Information  Ellen Adamson                                                                                          [facility] [place] [address]  1950        Chief Complaint   Patient presents with   • Hyperlipidemia   • Results     Going over labs, and 1 month follow up           History of Present Illness:    Ellen Adamson is a 72 y.o. female who presents to the office for follow-up after recent labs. She has HLD despite atorvastatin 40mg daily use.     Patient scheduled for lung cancer screening CT and DEXA.     Her only complaint today is R shoulder pain for approximately one month. She reports pain is exacerbated by overhead movements. She denies trauma. She reports having MRI that showed partial tears of rotator cuff muscles (specifics unknown). She is not undergoing PT for this, but amenable to home exercise program.         Allergies   Allergen Reactions   • Iodine Nausea And Vomiting   • Shellfish-Derived Products Nausea And Vomiting           Current Outpatient Medications:   •  ALPRAZolam (XANAX) 1 MG tablet, Take 1 tablet by mouth 4 (Four) Times a Day As Needed., Disp: , Rfl:   •  bumetanide (BUMEX) 1 MG tablet, , Disp: , Rfl:   •  Cholecalciferol (VITAMIN D3 PO), Take  by mouth., Disp: , Rfl:   •  cycloSPORINE (RESTASIS) 0.05 % ophthalmic emulsion, Restasis 0.05 % eye drops in a dropperette  INSTILL 1 DROP INTO AFFECTED EYE(S) BY OPHTHALMIC ROUTE EVERY 12 HOURS, Disp: , Rfl:   •  escitalopram (LEXAPRO) 5 MG tablet, , Disp: , Rfl:   •  FLUoxetine (PROzac) 20 MG capsule, Take 1 capsule by mouth Daily., Disp: , Rfl:   •  omeprazole (priLOSEC) 20 MG capsule, Take 1 capsule by mouth Daily for 360 days., Disp: 90 capsule, Rfl: 3  •  pramipexole (MIRAPEX) 0.5 MG tablet, Take 1 tablet by mouth Every 12 (Twelve) Hours., Disp: , Rfl:   •  zolpidem CR (AMBIEN CR) 12.5 MG CR tablet, ,  "Disp: , Rfl:   •  atorvastatin (LIPITOR) 40 MG tablet, Take 2 tablets by mouth Daily., Disp: , Rfl:       Social History     Socioeconomic History   • Marital status:      Spouse name: Ramírez   • Number of children: 3   Tobacco Use   • Smoking status: Every Day     Packs/day: 1.00     Years: 25.00     Pack years: 25.00     Types: Cigarettes     Passive exposure: Past   Substance and Sexual Activity   • Alcohol use: Not Currently   • Drug use: Never   • Sexual activity: Defer         Vitals:    05/24/23 1312   BP: 130/68   BP Location: Right arm   Patient Position: Sitting   Pulse: 79   Temp: 94.6 °F (34.8 °C)   SpO2: 98%   Weight: 88.7 kg (195 lb 9.6 oz)   Height: 162.6 cm (64.02\")      Wt Readings from Last 3 Encounters:   05/24/23 88.7 kg (195 lb 9.6 oz)   05/09/23 91.2 kg (201 lb)   04/24/23 89.4 kg (197 lb)     Body mass index is 33.56 kg/m².      Physical Exam  Constitutional:       Appearance: Normal appearance. She is obese. She is not ill-appearing.   Pulmonary:      Effort: Pulmonary effort is normal.   Neurological:      Mental Status: She is alert and oriented to person, place, and time.   Psychiatric:         Mood and Affect: Mood normal.         Behavior: Behavior normal.            Lab/other results:  Common labs        4/24/2023    14:50 5/18/2023    00:00   Common Labs   Glucose 106      BUN 25      Creatinine 1.00      Sodium 141      Potassium 4.8      Chloride 102      Calcium 10.2      Total Protein 7.0      Albumin 4.3      Total Bilirubin 0.4      Alkaline Phosphatase 176      AST (SGOT) 14      ALT (SGPT) 19      WBC 19.7      Hemoglobin 13.7      Hematocrit 41.0      Platelets 401      Total Cholesterol 234      Triglycerides 214      HDL Cholesterol 70      LDL Cholesterol  127      Hemoglobin A1C 6.5   6.1     Microalbumin, Urine 3.3          Assessment/Plan:    Diagnoses and all orders for this visit:    1. Mixed hyperlipidemia (Primary)  Assessment & Plan:  Lipid abnormalities are " newly identified.  Nutritional counseling was provided. and Pharmacotherapy as ordered.  Lipids will be reassessed in 1 year.    Increase atorvastatin to 80mg daily.      2. Obesity (BMI 30.0-34.9)  Assessment & Plan:  Patient's (Body mass index is 33.56 kg/m².) indicates that they are obese (BMI >30) with health conditions that include hypertension, diabetes mellitus and dyslipidemias . Weight is unchanged. BMI  is above average; BMI management plan is completed. We discussed portion control, increasing exercise and joining a fitness center or start home based exercise program.       3. Prediabetes  Assessment & Plan:  Encouraged weight loss through better food choices and regular exercise. A1c in one year.       4. Tobacco dependence  Overview:  Encouraged patient to quit. She is not interested at this time. Lung cancer screening CT pending.       5. Impingement syndrome of right shoulder  Overview:  Start home based exercise program and use diclofenac gel PRN. Follow-up in one month. If no resolution, will offer CSI.

## 2023-06-21 ENCOUNTER — TELEPHONE (OUTPATIENT)
Dept: PEDIATRICS | Facility: OTHER | Age: 73
End: 2023-06-21

## 2023-06-21 NOTE — TELEPHONE ENCOUNTER
Caller: ESTHER (NOT ON [facility])    Relationship: Brother/Sister    Best call back number: [phone]     What is the best time to reach you: ANY    Who are you requesting to speak with (clinical staff, provider,  specific staff member): DR. REA OR MA    What was the call regarding: PATIENT'S SISTER CALLED AND WANTED TO INFORM THE CONCERNS SHE HAS REGARDING THE PATIENT. SHE STATES SHE IS CONCERNED THAT THE PATIENT IS FREQUENTLY CONFUSED AND WILL REPEAT CONVERSATIONS, THAT SHE ALREADY HAD.

## 2023-08-17 ENCOUNTER — OFFICE VISIT (OUTPATIENT)
Dept: FAMILY MEDICINE CLINIC | Facility: CLINIC | Age: 73
End: 2023-08-17
Payer: MEDICARE

## 2023-08-17 VITALS
WEIGHT: 195.3 LBS | HEIGHT: 64 IN | OXYGEN SATURATION: 97 % | BODY MASS INDEX: 33.34 KG/M2 | DIASTOLIC BLOOD PRESSURE: 68 MMHG | HEART RATE: 80 BPM | SYSTOLIC BLOOD PRESSURE: 124 MMHG

## 2023-08-17 DIAGNOSIS — F17.200 TOBACCO DEPENDENCE: ICD-10-CM

## 2023-08-17 DIAGNOSIS — Z87.891 PERSONAL HISTORY OF NICOTINE DEPENDENCE: ICD-10-CM

## 2023-08-17 DIAGNOSIS — E66.9 OBESITY (BMI 30.0-34.9): Primary | ICD-10-CM

## 2023-08-17 PROBLEM — M75.41 IMPINGEMENT SYNDROME OF RIGHT SHOULDER: Status: RESOLVED | Noted: 2023-05-24 | Resolved: 2023-08-17

## 2023-08-17 PROBLEM — J45.909 ASTHMA: Status: RESOLVED | Noted: 2020-02-17 | Resolved: 2023-08-17

## 2023-08-17 PROBLEM — R60.9 DEPENDENT EDEMA: Status: RESOLVED | Noted: 2023-05-09 | Resolved: 2023-08-17

## 2023-08-17 PROBLEM — R61 HYPERHIDROSIS: Status: RESOLVED | Noted: 2021-06-10 | Resolved: 2023-08-17

## 2023-08-17 PROBLEM — M79.9 DISORDER OF MUSCULOSKELETAL SYSTEM: Status: RESOLVED | Noted: 2022-07-29 | Resolved: 2023-08-17

## 2023-08-17 PROCEDURE — 99214 OFFICE O/P EST MOD 30 MIN: CPT | Performed by: INTERNAL MEDICINE

## 2023-08-17 NOTE — PROGRESS NOTES
Timothy Carson MD  Internal Medicine  [phone] (office)             2023    Patient Information  Ellen Adamson                                                                                          [address] [facility] [address]  1950        Chief Complaint   Patient presents with    Hyperlipidemia     1 month follow up           History of Present Illness:    Ellen Adamson is a 72 y.o. female who presents to the office for routine follow-up.     She still isn't smoking. Missed lung cancer screening CT last month because her  need CABG. She is amenable to getting this done.         Allergies   Allergen Reactions    Iodine Nausea And Vomiting    Shellfish-Derived Products Nausea And Vomiting           Current Outpatient Medications:     ALPRAZolam (XANAX) 1 MG tablet, Take 1 tablet by mouth 4 (Four) Times a Day As Needed., Disp: , Rfl:     atorvastatin (LIPITOR) 40 MG tablet, Take 2 tablets by mouth Daily., Disp: , Rfl:     bumetanide (BUMEX) 1 MG tablet, , Disp: , Rfl:     Cholecalciferol (VITAMIN D3 PO), Take  by mouth., Disp: , Rfl:     cycloSPORINE (RESTASIS) 0.05 % ophthalmic emulsion, Restasis 0.05 % eye drops in a dropperette  INSTILL 1 DROP INTO AFFECTED EYE(S) BY OPHTHALMIC ROUTE EVERY 12 HOURS, Disp: , Rfl:     escitalopram (LEXAPRO) 5 MG tablet, , Disp: , Rfl:     omeprazole (priLOSEC) 20 MG capsule, Take 1 capsule by mouth Daily for 360 days., Disp: 90 capsule, Rfl: 3    pramipexole (MIRAPEX) 0.5 MG tablet, Take 1 tablet by mouth Every 12 (Twelve) Hours., Disp: , Rfl:       Social History     Socioeconomic History    Marital status:      Spouse name: Ramírez    Number of children: 3   Tobacco Use    Smoking status: Former     Packs/day: 1.00     Years: 25.00     Pack years: 25.00     Types: Cigarettes     Start date:      Quit date: 2023     Years since quittin.1     Passive exposure: Past   Substance and Sexual Activity    Alcohol use:  "Not Currently    Drug use: Never    Sexual activity: Defer         Vitals:    08/17/23 1246   BP: 124/68   BP Location: Right arm   Patient Position: Sitting   Cuff Size: Adult   Pulse: 80   SpO2: 97%   Weight: 88.6 kg (195 lb 4.8 oz)   Height: 162.6 cm (64.02\")      Wt Readings from Last 3 Encounters:   08/17/23 88.6 kg (195 lb 4.8 oz)   07/21/23 91.2 kg (201 lb 1.6 oz)   06/23/23 89.4 kg (197 lb)     Body mass index is 33.51 kg/mý.      Physical Exam  Vitals reviewed.   Constitutional:       Appearance: Normal appearance. She is obese. She is not ill-appearing.   Pulmonary:      Effort: Pulmonary effort is normal.   Neurological:      Mental Status: She is alert and oriented to person, place, and time.   Psychiatric:         Mood and Affect: Mood normal.         Behavior: Behavior normal.          Lab/other results:  Common labs          4/24/2023    14:50 5/18/2023    00:00 6/23/2023    13:37   Common Labs   Glucose 106      BUN 25      Creatinine 1.00      Sodium 141      Potassium 4.8      Chloride 102      Calcium 10.2      Total Protein 7.0      Albumin 4.3      Total Bilirubin 0.4      Alkaline Phosphatase 176      AST (SGOT) 14      ALT (SGPT) 19      WBC 19.7   11.2    Hemoglobin 13.7   13.0    Hematocrit 41.0   38.2    Platelets 401   321    Total Cholesterol 234      Triglycerides 214      HDL Cholesterol 70      LDL Cholesterol  127      Hemoglobin A1C 6.5  6.1     Microalbumin, Urine 3.3          Assessment/Plan:    Diagnoses and all orders for this visit:    1. Obesity (BMI 30.0-34.9) (Primary)  Assessment & Plan:  Patient's (Body mass index is 33.51 kg/mý.) indicates that they are obese (BMI >30) with health conditions that include hypertension, dyslipidemias, and osteoarthritis . Weight is improving with lifestyle modifications. BMI  is above average; BMI management plan is completed. We discussed portion control, increasing exercise, joining a fitness center or start home based exercise program, " and management of depression/anxiety/stress to control compensatory eating.       2. Tobacco dependence  Overview:  Lung cancer screening CT pending.     Assessment & Plan:  Tobacco use is improving with lifestyle modifications.  Smoking cessation counseling was provided.  Tobacco use will be reassessed in 3 months.    Still in remission, praised efforts to date and encouraged continue abstinence. Lung cancer screening CT ordered again.     Orders:  -     CT Chest Low Dose Wo; Future    3. Personal history of nicotine dependence  -     CT Chest Low Dose Wo; Future

## 2023-08-17 NOTE — ASSESSMENT & PLAN NOTE
Patient's (Body mass index is 33.51 kg/mý.) indicates that they are obese (BMI >30) with health conditions that include hypertension, dyslipidemias, and osteoarthritis . Weight is improving with lifestyle modifications. BMI  is above average; BMI management plan is completed. We discussed portion control, increasing exercise, joining a fitness center or start home based exercise program, and management of depression/anxiety/stress to control compensatory eating.

## 2023-08-17 NOTE — ASSESSMENT & PLAN NOTE
Tobacco use is improving with lifestyle modifications.  Smoking cessation counseling was provided.  Tobacco use will be reassessed in 3 months.    Still in remission, praised efforts to date and encouraged continue abstinence. Lung cancer screening CT ordered again.

## 2023-09-19 ENCOUNTER — PRE-ADMISSION TESTING (OUTPATIENT)
Dept: PREADMISSION TESTING | Facility: HOSPITAL | Age: 73
End: 2023-09-19
Payer: MEDICARE

## 2023-09-19 VITALS
TEMPERATURE: 98 F | OXYGEN SATURATION: 100 % | HEART RATE: 75 BPM | BODY MASS INDEX: 33.94 KG/M2 | SYSTOLIC BLOOD PRESSURE: 124 MMHG | RESPIRATION RATE: 16 BRPM | HEIGHT: 64 IN | DIASTOLIC BLOOD PRESSURE: 72 MMHG | WEIGHT: 198.8 LBS

## 2023-09-19 LAB
ANION GAP SERPL CALCULATED.3IONS-SCNC: 9.4 MMOL/L (ref 5–15)
BUN SERPL-MCNC: 12 MG/DL (ref 8–23)
BUN/CREAT SERPL: 10.3 (ref 7–25)
CALCIUM SPEC-SCNC: 9.7 MG/DL (ref 8.6–10.5)
CHLORIDE SERPL-SCNC: 105 MMOL/L (ref 98–107)
CO2 SERPL-SCNC: 26.6 MMOL/L (ref 22–29)
CREAT SERPL-MCNC: 1.17 MG/DL (ref 0.57–1)
DEPRECATED RDW RBC AUTO: 42.8 FL (ref 37–54)
EGFRCR SERPLBLD CKD-EPI 2021: 49.1 ML/MIN/1.73
ERYTHROCYTE [DISTWIDTH] IN BLOOD BY AUTOMATED COUNT: 12.8 % (ref 12.3–15.4)
GLUCOSE SERPL-MCNC: 125 MG/DL (ref 65–99)
HBA1C MFR BLD: 6.2 % (ref 4.8–5.6)
HCT VFR BLD AUTO: 37.8 % (ref 34–46.6)
HGB BLD-MCNC: 12.3 G/DL (ref 12–15.9)
MCH RBC QN AUTO: 29.8 PG (ref 26.6–33)
MCHC RBC AUTO-ENTMCNC: 32.5 G/DL (ref 31.5–35.7)
MCV RBC AUTO: 91.5 FL (ref 79–97)
PLATELET # BLD AUTO: 308 10*3/MM3 (ref 140–450)
PMV BLD AUTO: 10.2 FL (ref 6–12)
POTASSIUM SERPL-SCNC: 4.5 MMOL/L (ref 3.5–5.2)
QT INTERVAL: 403 MS
QTC INTERVAL: 444 MS
RBC # BLD AUTO: 4.13 10*6/MM3 (ref 3.77–5.28)
SODIUM SERPL-SCNC: 141 MMOL/L (ref 136–145)
WBC NRBC COR # BLD: 7.4 10*3/MM3 (ref 3.4–10.8)

## 2023-09-19 PROCEDURE — 83036 HEMOGLOBIN GLYCOSYLATED A1C: CPT

## 2023-09-19 PROCEDURE — 36415 COLL VENOUS BLD VENIPUNCTURE: CPT

## 2023-09-19 PROCEDURE — 80048 BASIC METABOLIC PNL TOTAL CA: CPT

## 2023-09-19 PROCEDURE — 93005 ELECTROCARDIOGRAM TRACING: CPT

## 2023-09-19 PROCEDURE — 85027 COMPLETE CBC AUTOMATED: CPT

## 2023-09-19 RX ORDER — ZOLPIDEM TARTRATE 12.5 MG/1
12.5 TABLET, FILM COATED, EXTENDED RELEASE ORAL NIGHTLY PRN
COMMUNITY
Start: 2023-09-08

## 2023-09-19 NOTE — DISCHARGE INSTRUCTIONS
Arrive to hospital on your day of surgery at 9AM    Take the following medications the morning of surgery:   OMEPRAZOLE      BRING ACCURATE LIST OF MEDICATIONS AND DOSAGES DAY OF SURGERY      If you are on prescription narcotic pain medication to control your pain you may also take that medication the morning of surgery.    General Instructions:  Do not eat solid food after midnight the night before surgery.  You may drink clear liquids day of surgery but must stop at least one hour before your hospital arrival time.  It is beneficial for you to have a clear drink that contains carbohydrates the day of surgery.  We suggest a 12 to 20 ounce bottle of Gatorade or Powerade for non-diabetic patients or a 12 to 20 ounce bottle of G2 or Powerade Zero for diabetic patients. (Pediatric patients, are not advised to drink a 12 to 20 ounce carbohydrate drink)    Clear liquids are liquids you can see through.  Nothing red in color.     Plain water                               Sports drinks  Sodas                                   Gelatin (Jell-O)  Fruit juices without pulp such as white grape juice and apple juice  Popsicles that contain no fruit or yogurt  Tea or coffee (no cream or milk added)  Gatorade / Powerade  G2 / Powerade Zero    Infants may have breast milk up to four hours before surgery.  Infants drinking formula may drink formula up to six hours before surgery.   Patients who avoid smoking, chewing tobacco and alcohol for 4 weeks prior to surgery have a reduced risk of post-operative complications.  Quit smoking as many days before surgery as you can.  Do not smoke, use chewing tobacco or drink alcohol the day of surgery.   If applicable bring your C-PAP/ BI-PAP machine in with you to preop day of surgery.  Bring any papers given to you in the doctor’s office.  Wear clean comfortable clothes.  Do not wear contact lenses, false eyelashes or make-up.  Bring a case for your glasses.   Bring crutches or walker if  applicable.  Remove all piercings.  Leave jewelry and any other valuables at home.  Hair extensions with metal clips must be removed prior to surgery.  The Pre-Admission Testing nurse will instruct you to bring medications if unable to obtain an accurate list in Pre-Admission Testing.        If you were given a blood bank ID arm band remember to bring it with you the day of surgery.    Preventing a Surgical Site Infection:  For 2 to 3 days before surgery, avoid shaving with a razor because the razor can irritate skin and make it easier to develop an infection.    Any areas of open skin can increase the risk of a post-operative wound infection by allowing bacteria to enter and travel throughout the body.  Notify your surgeon if you have any skin wounds / rashes even if it is not near the expected surgical site.  The area will need assessed to determine if surgery should be delayed until it is healed.  The night prior to surgery shower using a fresh bar of anti-bacterial soap (such as Dial) and clean washcloth.  Sleep in a clean bed with clean clothing.  Do not allow pets to sleep with you.  Shower on the morning of surgery using a fresh bar of anti-bacterial soap (such as Dial) and clean washcloth.  Dry with a clean towel and dress in clean clothing.  Ask your surgeon if you will be receiving antibiotics prior to surgery.  Make sure you, your family, and all healthcare providers clean their hands with soap and water or an alcohol based hand  before caring for you or your wound.    Day of surgery:  Your arrival time is approximately two hours before your scheduled surgery time.  Upon arrival, a Pre-op nurse and Anesthesiologist will review your health history, obtain vital signs, and answer questions you may have.  The only belongings needed at this time will be a list of your home medications and if applicable your C-PAP/BI-PAP machine.  A Pre-op nurse will start an IV and you may receive medication in  preparation for surgery, including something to help you relax.     Please be aware that surgery does come with discomfort.  We want to make every effort to control your discomfort so please discuss any uncontrolled symptoms with your nurse.   Your doctor will most likely have prescribed pain medications.      If you are going home after surgery you will receive individualized written care instructions before being discharged.  A responsible adult must drive you to and from the hospital on the day of your surgery and stay with you for 24 hours.  Discharge prescriptions can be filled by the hospital pharmacy during regular pharmacy hours.  If you are having surgery late in the day/evening your prescription may be e-prescribed to your pharmacy.  Please verify your pharmacy hours or chose a 24 hour pharmacy to avoid not having access to your prescription because your pharmacy has closed for the day.    If you are staying overnight following surgery, you will be transported to your hospital room following the recovery period.  [facility] has all private rooms.    If you have any questions please call Pre-Admission Testing at [phone].  Deductibles and co-payments are collected on the day of service. Please be prepared to pay the required co-pay, deductible or deposit on the day of service as defined by your plan.    Call your surgeon immediately if you experience any of the following symptoms:  Sore Throat  Shortness of Breath or difficulty breathing  Cough  Chills  Body soreness or muscle pain  Headache  Fever  New loss of taste or smell  Do not arrive for your surgery ill.  Your procedure will need to be rescheduled to another time.  You will need to call your physician before the day of surgery to avoid any unnecessary exposure to hospital staff as well as other patients.          CHLORHEXIDINE CLOTH INSTRUCTIONS  The morning of surgery follow these instructions using the Chlorhexidine cloths  you've been given.  These steps reduce bacteria on the body.  Do not use the cloths near your eyes, ears mouth, genitalia or on open wounds.  Throw the cloths away after use but do not try to flush them down a toilet.      Open and remove one cloth at a time from the package.    Leave the cloth unfolded and begin the bathing.  Massage the skin with the cloths using gentle pressure to remove bacteria.  Do not scrub harshly.   Follow the steps below with one 2% CHG cloth per area (6 total cloths).  One cloth for neck, shoulders and chest.  One cloth for both arms, hands, fingers and underarms (do underarms last).  One cloth for the abdomen followed by groin.  One cloth for right leg and foot including between the toes.  One cloth for left leg and foot including between the toes.  The last cloth is to be used for the back of the neck, back and buttocks.    Allow the CHG to air dry 3 minutes on the skin which will give it time to work and decrease the chance of irritation.  The skin may feel sticky until it is dry.  Do not rinse with water or any other liquid or you will lose the beneficial effects of the CHG.  If mild skin irritation occurs, do rinse the skin to remove the CHG.  Report this to the nurse at time of admission.  Do not apply lotions, creams, ointments, deodorants or perfumes after using the clothes. Dress in clean clothes before coming to the hospital.

## 2023-09-27 ENCOUNTER — HOSPITAL ENCOUNTER (OUTPATIENT)
Dept: CT IMAGING | Facility: HOSPITAL | Age: 73
Discharge: HOME | End: 2023-09-27
Admitting: INTERNAL MEDICINE
Payer: MEDICARE

## 2023-09-27 DIAGNOSIS — Z87.891 PERSONAL HISTORY OF NICOTINE DEPENDENCE: ICD-10-CM

## 2023-09-27 DIAGNOSIS — F17.200 TOBACCO DEPENDENCE: ICD-10-CM

## 2023-09-27 PROCEDURE — 71271 CT THORAX LUNG CANCER SCR C-: CPT

## 2023-10-09 ENCOUNTER — OFFICE VISIT (OUTPATIENT)
Dept: FAMILY MEDICINE CLINIC | Facility: CLINIC | Age: 73
End: 2023-10-09
Payer: MEDICARE

## 2023-10-09 VITALS
DIASTOLIC BLOOD PRESSURE: 70 MMHG | HEART RATE: 80 BPM | BODY MASS INDEX: 33.2 KG/M2 | HEIGHT: 64 IN | SYSTOLIC BLOOD PRESSURE: 136 MMHG | OXYGEN SATURATION: 98 % | WEIGHT: 194.5 LBS

## 2023-10-09 DIAGNOSIS — M25.561 CHRONIC PAIN OF RIGHT KNEE: Primary | ICD-10-CM

## 2023-10-09 DIAGNOSIS — G89.29 CHRONIC PAIN OF RIGHT KNEE: Primary | ICD-10-CM

## 2023-10-09 PROCEDURE — 99213 OFFICE O/P EST LOW 20 MIN: CPT | Performed by: INTERNAL MEDICINE

## 2023-10-09 RX ORDER — MONTELUKAST SODIUM 10 MG/1
1 TABLET ORAL DAILY
COMMUNITY

## 2023-10-09 RX ORDER — ALPRAZOLAM 1 MG/1
1 TABLET ORAL 4 TIMES DAILY
COMMUNITY

## 2023-10-09 RX ORDER — FOLIC ACID 1 MG/1
1 TABLET ORAL DAILY
COMMUNITY

## 2023-10-09 RX ORDER — SACCHAROMYCES BOULARDII 250 MG
1 CAPSULE ORAL DAILY
COMMUNITY

## 2023-10-09 RX ORDER — ESCITALOPRAM OXALATE 10 MG/1
TABLET ORAL
COMMUNITY
Start: 2023-09-12

## 2023-10-09 RX ORDER — SUCRALFATE 1 G/1
TABLET ORAL
COMMUNITY

## 2023-10-09 RX ORDER — FAMOTIDINE 40 MG/1
1 TABLET, FILM COATED ORAL DAILY
COMMUNITY
Start: 2023-09-19

## 2023-10-09 NOTE — PROGRESS NOTES
Timothy Carson MD  Internal Medicine  [phone] (office)             10/09/2023    Patient Information  Ellen Adamson                                                                                          [facility] [id number]  1950        Chief Complaint   Patient presents with    Med Refill          History of Present Illness:    Ellen Adamson is a 72 y.o. female who presents to the office for R knee pain. She cancelled planned knee replacement and is requesting PT consult and refill of compound topical analgesic.        Allergies   Allergen Reactions    Iodine Nausea And Vomiting    Shellfish-Derived Products Nausea And Vomiting           Current Outpatient Medications:     ALPRAZolam (XANAX) 1 MG tablet, Take 1 tablet by mouth 4 (Four) Times a Day., Disp: , Rfl:     atorvastatin (LIPITOR) 40 MG tablet, Take 2 tablets by mouth Daily., Disp: , Rfl:     CHLORHEXIDINE GLUCONATE CLOTH EX, Apply  topically. USE AS DIRECTED, Disp: , Rfl:     Cholecalciferol (VITAMIN D3 PO), Take 1 tablet by mouth Daily. HOLDING FOR DOS, Disp: , Rfl:     cycloSPORINE (RESTASIS) 0.05 % ophthalmic emulsion, Administer 1 drop to both eyes Every 12 (Twelve) Hours., Disp: , Rfl:     escitalopram (LEXAPRO) 10 MG tablet, TAKE 1 TABLET BY MOUTH DAILY AT 6 PM, Disp: , Rfl:     folic acid (FOLVITE) 1 MG tablet, Take 1 tablet by mouth Daily., Disp: , Rfl:     montelukast (SINGULAIR) 10 MG tablet, Take 1 tablet by mouth Daily., Disp: , Rfl:     omeprazole (priLOSEC) 20 MG capsule, Take 1 capsule by mouth Daily for 360 days., Disp: 90 capsule, Rfl: 3    saccharomyces boulardii (FLORASTOR) 250 MG capsule, Take 1 capsule by mouth Daily., Disp: , Rfl:     sucralfate (CARAFATE) 1 g tablet, TAKE 1 TABLET BY MOUTH 15 MINUTES BEFORE FOOD AND EVERY NIGHT AT BEDTIME, Disp: , Rfl:     ubrogepant (Ubrelvy) 100 MG tablet, , Disp: , Rfl:     zolpidem CR (AMBIEN CR) 12.5 MG CR tablet, Take 1 tablet by mouth At Night  "As Needed., Disp: , Rfl:     famotidine (PEPCID) 40 MG tablet, Take 1 tablet by mouth Daily. (Patient not taking: Reported on 10/9/2023), Disp: , Rfl:       Social History     Socioeconomic History    Marital status:      Spouse name: Ramírez    Number of children: 3   Tobacco Use    Smoking status: Every Day     Packs/day: 1.00     Years: 25.00     Additional pack years: 0.00     Total pack years: 25.00     Types: Cigarettes     Start date: 1964     Passive exposure: Past   Vaping Use    Vaping Use: Never used   Substance and Sexual Activity    Alcohol use: Yes     Comment: OCCAS    Drug use: Never    Sexual activity: Defer         Vitals:    10/09/23 1613   BP: 136/70   BP Location: Right arm   Patient Position: Sitting   Cuff Size: Adult   Pulse: 80   SpO2: 98%   Weight: 88.2 kg (194 lb 8 oz)   Height: 162.6 cm (64.02\")      Wt Readings from Last 3 Encounters:   10/09/23 88.2 kg (194 lb 8 oz)   09/19/23 90.2 kg (198 lb 12.8 oz)   08/17/23 88.6 kg (195 lb 4.8 oz)     Body mass index is 33.37 kg/mý.      Physical Exam  Vitals reviewed.   Constitutional:       Appearance: Normal appearance. She is obese. She is not ill-appearing.   Neurological:      Mental Status: She is alert and oriented to person, place, and time.   Psychiatric:         Mood and Affect: Mood normal.         Behavior: Behavior normal.            Lab/other results:    R knee plain films (2/21/23) - reviewed      Assessment/Plan:    Diagnoses and all orders for this visit:    1. Chronic pain of right knee (Primary)  -     Ambulatory Referral to Physical Therapy       PT consult placed and prescription for compound topical given.         "

## 2023-10-30 ENCOUNTER — TELEPHONE (OUTPATIENT)
Dept: PHYSICAL THERAPY | Facility: OTHER | Age: 73
End: 2023-10-30
Payer: MEDICARE

## 2023-10-30 NOTE — TELEPHONE ENCOUNTER
Caller: Ellen Adamson    Relationship: Self    What was the call regarding: PATIENT CANCELLED TODAYS APPT DUE TO FAMILY EMERGENCY

## 2024-09-12 ENCOUNTER — APPOINTMENT (OUTPATIENT)
Dept: GENERAL RADIOLOGY | Facility: HOSPITAL | Age: 74
End: 2024-09-12
Payer: MEDICARE

## 2024-09-12 ENCOUNTER — HOSPITAL ENCOUNTER (OUTPATIENT)
Facility: HOSPITAL | Age: 74
Setting detail: OBSERVATION
Discharge: HOME | End: 2024-09-13
Attending: EMERGENCY MEDICINE | Admitting: EMERGENCY MEDICINE
Payer: MEDICARE

## 2024-09-12 DIAGNOSIS — R07.9 ACUTE CHEST PAIN: Primary | ICD-10-CM

## 2024-09-12 LAB
ALBUMIN SERPL-MCNC: 3.9 G/DL (ref 3.5–5.2)
ALBUMIN/GLOB SERPL: 1.6 G/DL
ALP SERPL-CCNC: 146 U/L (ref 39–117)
ALT SERPL W P-5'-P-CCNC: 9 U/L (ref 1–33)
ANION GAP SERPL CALCULATED.3IONS-SCNC: 8.5 MMOL/L (ref 5–15)
AST SERPL-CCNC: 12 U/L (ref 1–32)
BASOPHILS # BLD AUTO: 0.1 10*3/MM3 (ref 0–0.2)
BASOPHILS NFR BLD AUTO: 0.9 % (ref 0–1.5)
BILIRUB SERPL-MCNC: <0.2 MG/DL (ref 0–1.2)
BUN SERPL-MCNC: 14 MG/DL (ref 8–23)
BUN/CREAT SERPL: 13.3 (ref 7–25)
CALCIUM SPEC-SCNC: 9.5 MG/DL (ref 8.6–10.5)
CHLORIDE SERPL-SCNC: 107 MMOL/L (ref 98–107)
CHOLEST SERPL-MCNC: 238 MG/DL (ref 0–200)
CO2 SERPL-SCNC: 26.5 MMOL/L (ref 22–29)
CREAT SERPL-MCNC: 1.05 MG/DL (ref 0.57–1)
DEPRECATED RDW RBC AUTO: 45.5 FL (ref 37–54)
EGFRCR SERPLBLD CKD-EPI 2021: 55.5 ML/MIN/1.73
EOSINOPHIL # BLD AUTO: 0.3 10*3/MM3 (ref 0–0.4)
EOSINOPHIL NFR BLD AUTO: 2.7 % (ref 0.3–6.2)
ERYTHROCYTE [DISTWIDTH] IN BLOOD BY AUTOMATED COUNT: 13.5 % (ref 12.3–15.4)
GEN 5 2HR TROPONIN T REFLEX: 7 NG/L
GLOBULIN UR ELPH-MCNC: 2.5 GM/DL
GLUCOSE SERPL-MCNC: 110 MG/DL (ref 65–99)
HBA1C MFR BLD: 5.9 % (ref 4.8–5.6)
HCT VFR BLD AUTO: 37.8 % (ref 34–46.6)
HDLC SERPL-MCNC: 40 MG/DL (ref 40–60)
HGB BLD-MCNC: 12.5 G/DL (ref 12–15.9)
HOLD SPECIMEN: NORMAL
HOLD SPECIMEN: NORMAL
IMM GRANULOCYTES # BLD AUTO: 0.03 10*3/MM3 (ref 0–0.05)
IMM GRANULOCYTES NFR BLD AUTO: 0.3 % (ref 0–0.5)
LDLC SERPL CALC-MCNC: 138 MG/DL (ref 0–100)
LDLC/HDLC SERPL: 3.29 {RATIO}
LYMPHOCYTES # BLD AUTO: 3.09 10*3/MM3 (ref 0.7–3.1)
LYMPHOCYTES NFR BLD AUTO: 28.3 % (ref 19.6–45.3)
MCH RBC QN AUTO: 30.2 PG (ref 26.6–33)
MCHC RBC AUTO-ENTMCNC: 33.1 G/DL (ref 31.5–35.7)
MCV RBC AUTO: 91.3 FL (ref 79–97)
MONOCYTES # BLD AUTO: 0.65 10*3/MM3 (ref 0.1–0.9)
MONOCYTES NFR BLD AUTO: 6 % (ref 5–12)
NEUTROPHILS NFR BLD AUTO: 6.74 10*3/MM3 (ref 1.7–7)
NEUTROPHILS NFR BLD AUTO: 61.8 % (ref 42.7–76)
NRBC BLD AUTO-RTO: 0 /100 WBC (ref 0–0.2)
PLATELET # BLD AUTO: 327 10*3/MM3 (ref 140–450)
PMV BLD AUTO: 9.8 FL (ref 6–12)
POTASSIUM SERPL-SCNC: 4.4 MMOL/L (ref 3.5–5.2)
PROT SERPL-MCNC: 6.4 G/DL (ref 6–8.5)
QT INTERVAL: 368 MS
QTC INTERVAL: 420 MS
RBC # BLD AUTO: 4.14 10*6/MM3 (ref 3.77–5.28)
SODIUM SERPL-SCNC: 142 MMOL/L (ref 136–145)
TRIGL SERPL-MCNC: 333 MG/DL (ref 0–150)
TROPONIN T DELTA: 0 NG/L
TROPONIN T SERPL HS-MCNC: 7 NG/L
VLDLC SERPL-MCNC: 60 MG/DL (ref 5–40)
WBC NRBC COR # BLD AUTO: 10.91 10*3/MM3 (ref 3.4–10.8)
WHOLE BLOOD HOLD COAG: NORMAL
WHOLE BLOOD HOLD SPECIMEN: NORMAL

## 2024-09-12 PROCEDURE — 93005 ELECTROCARDIOGRAM TRACING: CPT

## 2024-09-12 PROCEDURE — 84484 ASSAY OF TROPONIN QUANT: CPT

## 2024-09-12 PROCEDURE — 99204 OFFICE O/P NEW MOD 45 MIN: CPT | Performed by: INTERNAL MEDICINE

## 2024-09-12 PROCEDURE — 85025 COMPLETE CBC W/AUTO DIFF WBC: CPT

## 2024-09-12 PROCEDURE — 99285 EMERGENCY DEPT VISIT HI MDM: CPT

## 2024-09-12 PROCEDURE — 93005 ELECTROCARDIOGRAM TRACING: CPT | Performed by: EMERGENCY MEDICINE

## 2024-09-12 PROCEDURE — 83036 HEMOGLOBIN GLYCOSYLATED A1C: CPT | Performed by: INTERNAL MEDICINE

## 2024-09-12 PROCEDURE — 84484 ASSAY OF TROPONIN QUANT: CPT | Performed by: EMERGENCY MEDICINE

## 2024-09-12 PROCEDURE — 93010 ELECTROCARDIOGRAM REPORT: CPT | Performed by: INTERNAL MEDICINE

## 2024-09-12 PROCEDURE — 71045 X-RAY EXAM CHEST 1 VIEW: CPT

## 2024-09-12 PROCEDURE — 36415 COLL VENOUS BLD VENIPUNCTURE: CPT

## 2024-09-12 PROCEDURE — 80061 LIPID PANEL: CPT | Performed by: INTERNAL MEDICINE

## 2024-09-12 PROCEDURE — G0378 HOSPITAL OBSERVATION PER HR: HCPCS

## 2024-09-12 PROCEDURE — 80053 COMPREHEN METABOLIC PANEL: CPT

## 2024-09-12 PROCEDURE — 63710000001 DIPHENHYDRAMINE PER 50 MG: Performed by: PHYSICIAN ASSISTANT

## 2024-09-12 PROCEDURE — 63710000001 PREDNISONE PER 1 MG: Performed by: PHYSICIAN ASSISTANT

## 2024-09-12 RX ORDER — SODIUM CHLORIDE 9 MG/ML
40 INJECTION, SOLUTION INTRAVENOUS AS NEEDED
Status: DISCONTINUED | OUTPATIENT
Start: 2024-09-12 | End: 2024-09-13 | Stop reason: HOSPADM

## 2024-09-12 RX ORDER — PREDNISONE 20 MG/1
50 TABLET ORAL ONCE
Status: DISCONTINUED | OUTPATIENT
Start: 2024-09-12 | End: 2024-09-12

## 2024-09-12 RX ORDER — FAMOTIDINE 20 MG/1
40 TABLET, FILM COATED ORAL DAILY
Status: DISCONTINUED | OUTPATIENT
Start: 2024-09-13 | End: 2024-09-13 | Stop reason: HOSPADM

## 2024-09-12 RX ORDER — MEMANTINE HYDROCHLORIDE 10 MG/1
10 TABLET ORAL DAILY
COMMUNITY

## 2024-09-12 RX ORDER — METOPROLOL TARTRATE 50 MG
50 TABLET ORAL EVERY 12 HOURS SCHEDULED
Status: DISCONTINUED | OUTPATIENT
Start: 2024-09-12 | End: 2024-09-13

## 2024-09-12 RX ORDER — ALPRAZOLAM 0.5 MG
1 TABLET ORAL 4 TIMES DAILY
Status: DISCONTINUED | OUTPATIENT
Start: 2024-09-12 | End: 2024-09-13 | Stop reason: HOSPADM

## 2024-09-12 RX ORDER — SODIUM CHLORIDE 9 MG/ML
75 INJECTION, SOLUTION INTRAVENOUS CONTINUOUS
Status: DISCONTINUED | OUTPATIENT
Start: 2024-09-13 | End: 2024-09-13 | Stop reason: HOSPADM

## 2024-09-12 RX ORDER — ESCITALOPRAM OXALATE 10 MG/1
10 TABLET ORAL DAILY
Status: DISCONTINUED | OUTPATIENT
Start: 2024-09-13 | End: 2024-09-13 | Stop reason: HOSPADM

## 2024-09-12 RX ORDER — DONEPEZIL HYDROCHLORIDE 10 MG/1
10 TABLET, FILM COATED ORAL NIGHTLY
Status: DISCONTINUED | OUTPATIENT
Start: 2024-09-12 | End: 2024-09-13 | Stop reason: HOSPADM

## 2024-09-12 RX ORDER — ASPIRIN 325 MG
325 TABLET ORAL ONCE
Status: COMPLETED | OUTPATIENT
Start: 2024-09-12 | End: 2024-09-12

## 2024-09-12 RX ORDER — DIPHENHYDRAMINE HYDROCHLORIDE 50 MG/ML
50 INJECTION INTRAMUSCULAR; INTRAVENOUS
Status: COMPLETED | OUTPATIENT
Start: 2024-09-12 | End: 2024-09-12

## 2024-09-12 RX ORDER — METOPROLOL TARTRATE 1 MG/ML
5 INJECTION, SOLUTION INTRAVENOUS
Status: DISCONTINUED | OUTPATIENT
Start: 2024-09-12 | End: 2024-09-13 | Stop reason: HOSPADM

## 2024-09-12 RX ORDER — ACETAMINOPHEN 160 MG/5ML
650 SOLUTION ORAL EVERY 4 HOURS PRN
Status: DISCONTINUED | OUTPATIENT
Start: 2024-09-12 | End: 2024-09-13 | Stop reason: HOSPADM

## 2024-09-12 RX ORDER — ACETAMINOPHEN 325 MG/1
650 TABLET ORAL EVERY 4 HOURS PRN
Status: DISCONTINUED | OUTPATIENT
Start: 2024-09-12 | End: 2024-09-13 | Stop reason: HOSPADM

## 2024-09-12 RX ORDER — ACETAMINOPHEN 650 MG/1
650 SUPPOSITORY RECTAL EVERY 4 HOURS PRN
Status: DISCONTINUED | OUTPATIENT
Start: 2024-09-12 | End: 2024-09-13 | Stop reason: HOSPADM

## 2024-09-12 RX ORDER — QUETIAPINE FUMARATE 50 MG/1
1 TABLET, FILM COATED ORAL EVERY 12 HOURS SCHEDULED
COMMUNITY

## 2024-09-12 RX ORDER — FOLIC ACID 1 MG/1
1000 TABLET ORAL DAILY
Status: DISCONTINUED | OUTPATIENT
Start: 2024-09-13 | End: 2024-09-13 | Stop reason: HOSPADM

## 2024-09-12 RX ORDER — ZOLPIDEM TARTRATE 5 MG/1
5 TABLET ORAL NIGHTLY PRN
Status: DISCONTINUED | OUTPATIENT
Start: 2024-09-12 | End: 2024-09-13 | Stop reason: HOSPADM

## 2024-09-12 RX ORDER — DONEPEZIL HYDROCHLORIDE 10 MG/1
10 TABLET, FILM COATED ORAL NIGHTLY
COMMUNITY

## 2024-09-12 RX ORDER — MONTELUKAST SODIUM 10 MG/1
10 TABLET ORAL DAILY
Status: DISCONTINUED | OUTPATIENT
Start: 2024-09-13 | End: 2024-09-13 | Stop reason: HOSPADM

## 2024-09-12 RX ORDER — DIPHENHYDRAMINE HCL 25 MG
50 CAPSULE ORAL
Status: COMPLETED | OUTPATIENT
Start: 2024-09-12 | End: 2024-09-12

## 2024-09-12 RX ORDER — SODIUM CHLORIDE 0.9 % (FLUSH) 0.9 %
10 SYRINGE (ML) INJECTION AS NEEDED
Status: DISCONTINUED | OUTPATIENT
Start: 2024-09-12 | End: 2024-09-13 | Stop reason: HOSPADM

## 2024-09-12 RX ORDER — QUETIAPINE FUMARATE 50 MG/1
50 TABLET, FILM COATED ORAL EVERY 12 HOURS SCHEDULED
Status: DISCONTINUED | OUTPATIENT
Start: 2024-09-12 | End: 2024-09-13 | Stop reason: HOSPADM

## 2024-09-12 RX ORDER — TAMSULOSIN HYDROCHLORIDE 0.4 MG/1
0.4 CAPSULE ORAL DAILY
Status: DISCONTINUED | OUTPATIENT
Start: 2024-09-13 | End: 2024-09-13 | Stop reason: HOSPADM

## 2024-09-12 RX ORDER — NITROGLYCERIN 0.4 MG/1
0.4 TABLET SUBLINGUAL
Status: COMPLETED | OUTPATIENT
Start: 2024-09-12 | End: 2024-09-13

## 2024-09-12 RX ORDER — ONDANSETRON 2 MG/ML
4 INJECTION INTRAMUSCULAR; INTRAVENOUS EVERY 6 HOURS PRN
Status: DISCONTINUED | OUTPATIENT
Start: 2024-09-12 | End: 2024-09-13 | Stop reason: HOSPADM

## 2024-09-12 RX ORDER — METOPROLOL TARTRATE 50 MG
50 TABLET ORAL ONCE
Status: DISCONTINUED | OUTPATIENT
Start: 2024-09-12 | End: 2024-09-12

## 2024-09-12 RX ORDER — NITROGLYCERIN 0.4 MG/1
0.8 TABLET SUBLINGUAL
Status: COMPLETED | OUTPATIENT
Start: 2024-09-12 | End: 2024-09-13

## 2024-09-12 RX ORDER — PREDNISONE 20 MG/1
50 TABLET ORAL ONCE
Status: COMPLETED | OUTPATIENT
Start: 2024-09-13 | End: 2024-09-13

## 2024-09-12 RX ORDER — SODIUM CHLORIDE 0.9 % (FLUSH) 0.9 %
10 SYRINGE (ML) INJECTION EVERY 12 HOURS SCHEDULED
Status: DISCONTINUED | OUTPATIENT
Start: 2024-09-12 | End: 2024-09-13 | Stop reason: HOSPADM

## 2024-09-12 RX ORDER — MEMANTINE HYDROCHLORIDE 10 MG/1
10 TABLET ORAL EVERY 12 HOURS SCHEDULED
Status: DISCONTINUED | OUTPATIENT
Start: 2024-09-12 | End: 2024-09-13 | Stop reason: HOSPADM

## 2024-09-12 RX ORDER — TAMSULOSIN HYDROCHLORIDE 0.4 MG/1
1 CAPSULE ORAL DAILY
COMMUNITY

## 2024-09-12 RX ORDER — ATORVASTATIN CALCIUM 80 MG/1
80 TABLET, FILM COATED ORAL DAILY
Status: DISCONTINUED | OUTPATIENT
Start: 2024-09-13 | End: 2024-09-13 | Stop reason: HOSPADM

## 2024-09-12 RX ORDER — ONDANSETRON 4 MG/1
4 TABLET, ORALLY DISINTEGRATING ORAL EVERY 6 HOURS PRN
Status: DISCONTINUED | OUTPATIENT
Start: 2024-09-12 | End: 2024-09-13 | Stop reason: HOSPADM

## 2024-09-12 RX ORDER — PREDNISONE 20 MG/1
50 TABLET ORAL ONCE
Status: COMPLETED | OUTPATIENT
Start: 2024-09-12 | End: 2024-09-12

## 2024-09-12 RX ORDER — NITROGLYCERIN 0.4 MG/1
0.4 TABLET SUBLINGUAL
Status: DISCONTINUED | OUTPATIENT
Start: 2024-09-12 | End: 2024-09-13 | Stop reason: HOSPADM

## 2024-09-12 RX ADMIN — ASPIRIN 325 MG: 325 TABLET ORAL at 13:08

## 2024-09-12 RX ADMIN — NICOTINE 1 PATCH: 7 PATCH TRANSDERMAL at 20:01

## 2024-09-12 RX ADMIN — MEMANTINE HYDROCHLORIDE 10 MG: 10 TABLET, FILM COATED ORAL at 20:02

## 2024-09-12 RX ADMIN — ALPRAZOLAM 1 MG: 0.5 TABLET ORAL at 20:02

## 2024-09-12 RX ADMIN — Medication 10 ML: at 14:18

## 2024-09-12 RX ADMIN — DONEPEZIL HYDROCHLORIDE 10 MG: 10 TABLET, FILM COATED ORAL at 20:02

## 2024-09-12 RX ADMIN — ALPRAZOLAM 1 MG: 0.5 TABLET ORAL at 18:25

## 2024-09-12 RX ADMIN — Medication 10 ML: at 20:09

## 2024-09-12 RX ADMIN — DIPHENHYDRAMINE HYDROCHLORIDE 50 MG: 25 CAPSULE ORAL at 20:02

## 2024-09-12 RX ADMIN — PREDNISONE 50 MG: 20 TABLET ORAL at 20:03

## 2024-09-12 RX ADMIN — QUETIAPINE FUMARATE 50 MG: 50 TABLET ORAL at 20:02

## 2024-09-12 NOTE — PROGRESS NOTES
Clinical Pharmacy Services: Medication History    Ellen Adamson is a 73 y.o. female presenting to [facility] for   Chief Complaint   Patient presents with    Chest Pain     Chest pains last night and today states chest is sore        She  has a past medical history of Anxiety and depression, Arthritis, Asthma, Cancer, CKD (chronic kidney disease) (2019), COPD (chronic obstructive pulmonary disease), GERD (gastroesophageal reflux disease), Hyperlipidemia, Lumbar stenosis, and Sjogren's syndrome.    Allergies as of 09/12/2024 - Reviewed 09/12/2024   Allergen Reaction Noted    Iodine Nausea And Vomiting 04/07/2016    Shellfish-derived products Nausea And Vomiting 02/04/2020       Medication information was obtained from: Patient & Spouse   Pharmacy and Phone Number:    Prior to Admission Medications       Prescriptions Last Dose Informant Patient Reported? Taking?    ALPRAZolam (XANAX) 1 MG tablet  Self, Spouse/Significant Other Yes Yes    Take 1 tablet by mouth 4 (Four) Times a Day.    atorvastatin (LIPITOR) 40 MG tablet  Spouse/Significant Other, Self Yes Yes    Take 2 tablets by mouth Daily.    Cholecalciferol (VITAMIN D3 PO)  Self, Spouse/Significant Other Yes Yes    Take 1 tablet by mouth Daily.    cycloSPORINE (RESTASIS) 0.05 % ophthalmic emulsion  Self Yes Yes    Administer 1 drop to both eyes As Needed.    donepezil (ARICEPT) 10 MG tablet  Self, Spouse/Significant Other Yes Yes    Take 1 tablet by mouth Every Night.    escitalopram (LEXAPRO) 10 MG tablet  Self, Spouse/Significant Other Yes Yes    Take 1 tablet by mouth Daily.    famotidine (PEPCID) 40 MG tablet  Self, Spouse/Significant Other Yes Yes    Take 1 tablet by mouth Daily.    folic acid (FOLVITE) 1 MG tablet  Self, Spouse/Significant Other Yes Yes    Take 1 tablet by mouth Daily.    memantine (NAMENDA) 10 MG tablet Patient Taking Differently Spouse/Significant Other Yes Yes    Take 1 tablet by mouth Daily.    montelukast (SINGULAIR)  10 MG tablet  Self, Spouse/Significant Other Yes Yes    Take 1 tablet by mouth Daily.    tamsulosin (FLOMAX) 0.4 MG capsule 24 hr capsule  Self, Spouse/Significant Other Yes Yes    Take 1 capsule by mouth Daily.    ubrogepant (Ubrelvy) 100 MG tablet  Self Yes Yes    Take 1 tablet by mouth 1 (One) Time As Needed.    zolpidem CR (AMBIEN CR) 12.5 MG CR tablet  Self, Spouse/Significant Other Yes Yes    Take 1 tablet by mouth At Night As Needed for Sleep.    CHLORHEXIDINE GLUCONATE CLOTH EX   Yes No    Apply  topically. USE AS DIRECTED    QUEtiapine (SEROquel) 50 MG tablet Unknown Pharmacy Yes No    Take 1 tablet by mouth Every 12 (Twelve) Hours.    saccharomyces boulardii (FLORASTOR) 250 MG capsule   Yes No    Take 1 capsule by mouth Daily.    sucralfate (CARAFATE) 1 g tablet   Yes No    TAKE 1 TABLET BY MOUTH 15 MINUTES BEFORE FOOD AND EVERY NIGHT AT BEDTIME              Medication notes: Spouse stated that pt takes her memantine daily instead of twice a day. Patient is not compliant with medications.     This medication list is complete to the best of my knowledge as of 9/12/2024    Please call if questions.    Marlon Merlos  Medication History Technician   [phone]    9/12/2024 13:57 EDT

## 2024-09-12 NOTE — ED PROVIDER NOTES
EMERGENCY DEPARTMENT ENCOUNTER  Room Number:  07/07  PCP: Provider, No Known  Independent Historians: Patient      HPI:  Chief Complaint: had concerns including Chest Pain (Chest pains last night and today states chest is sore ).     A complete HPI/ROS/PMH/PSH/SH/FH are unobtainable due to: None    Chronic or social conditions impacting patient care (Social Determinants of Health): None      Context: Ellen Adamson is a 73 y.o. female with a medical history of arthritis, GERD, CKD, COPD, hyperlipidemia, hypertension, prediabetes, carotid artery stenosis, and tobacco use who presents to the ED c/o acute chest pain.  Patient reports she was woken from sleep at 0430 this morning with sharp and crushing chest pain.  Reports she had associated nausea and diaphoresis.  No reported dyspnea.  Reports the pain has eased to a dull soreness.  She denies injury or trauma to the chest.  No history of coronary artery disease.  No family history of coronary artery disease.  Patient does smoke tobacco daily.  Patient has no other systemic complaints at this time.      Review of prior external notes (non-ED) -and- Review of prior external test results outside of this encounter:  Patient seen in office by PCP on 7/12/2024 for prediabetes, CKD, COPD, insomnia.  Unable to load note to review assessment and plan.  Reviewed labs collected on 9/19/2023.  CBC with hemoglobin 12.3, BMP with creatinine 1.17.    Prescription drug monitoring program review:     N/A    PAST MEDICAL HISTORY  Active Ambulatory Problems     Diagnosis Date Noted    Osteoarthritis of right hip 02/10/2020    GERD without esophagitis 02/17/2020    CKD (chronic kidney disease) stage 3, GFR 30-59 ml/min 02/17/2020    Sjogren's syndrome 02/28/2020    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) 02/28/2020    Obesity (BMI 30.0-34.9) 04/24/2023    Mixed hyperlipidemia 04/24/2023    Prediabetes 04/24/2023    Anxiety and depression 04/24/2023    Actinic  keratosis 05/09/2023    Arthralgia of right ankle 06/06/2022    Stricture of esophagus 09/17/2020    Carotid artery stenosis 08/25/2022    Chronic gastritis 12/20/2019    Chronic pain disorder 07/29/2022    Degeneration of lumbar intervertebral disc 07/29/2022    Heartburn 01/25/2023    Hypothyroid 11/13/2017    Thoracic neuritis 08/01/2022    Lumbosacral radiculitis 07/01/2022    Mild persistent asthma without complication 01/25/2023    Peptic ulcer 07/01/2022    Seasonal allergic rhinitis 06/10/2021    Tobacco dependence 05/24/2023     Resolved Ambulatory Problems     Diagnosis Date Noted    Pneumonia of both lungs due to infectious organism 02/17/2020    Asthma 02/17/2020    COPD with hypoxia 02/17/2020    Leukocytosis 02/17/2020    Acute urinary retention 02/18/2020    Dyspnea on exertion 02/28/2020    Thrombocytosis 02/28/2020    Anemia 02/28/2020    Dyspnea 02/29/2020    Acne rosacea, erythematous telangiectatic type 11/03/2016    Disorder of musculoskeletal system 07/29/2022    Hyperhidrosis 06/10/2021    Kidney disease 01/25/2023    Mild major depression 10/09/2017    Dependent edema 05/09/2023    Impingement syndrome of right shoulder 05/24/2023     Past Medical History:   Diagnosis Date    Arthritis     Cancer     CKD (chronic kidney disease) 2019    COPD (chronic obstructive pulmonary disease)     GERD (gastroesophageal reflux disease)     Hyperlipidemia     Lumbar stenosis          PAST SURGICAL HISTORY  Past Surgical History:   Procedure Laterality Date    ANKLE SURGERY      RIGHT    APPENDECTOMY      CHOLECYSTECTOMY      COLONOSCOPY      ELBOW PROCEDURE Left     ENDOSCOPY      HYSTERECTOMY      TOTAL HIP ARTHROPLASTY Right 2/10/2020    Procedure: TOTAL HIP ARTHROPLASTY ANTERIOR WITH HANA TABLE;  Surgeon: Jann Khan MD;  Location: [facility] OR;  Service: Orthopedics;  Laterality: Right;         FAMILY HISTORY  Family History   Problem Relation Age of Onset    No Known Problems Mother     No Known  Problems Father     Malpatricia Hyperthermia Neg Hx          SOCIAL HISTORY  Social History     Socioeconomic History    Marital status:      Spouse name: Ramírez    Number of children: 3   Tobacco Use    Smoking status: Every Day     Current packs/day: 1.00     Average packs/day: 1 pack/day for 60.7 years (60.7 ttl pk-yrs)     Types: Cigarettes     Start date: 1964     Passive exposure: Past   Vaping Use    Vaping status: Never Used   Substance and Sexual Activity    Alcohol use: Yes     Comment: OCCAS    Drug use: Never    Sexual activity: Defer         ALLERGIES  Iodine and Shellfish-derived products      REVIEW OF SYSTEMS  Review of Systems   Constitutional:  Positive for diaphoresis. Negative for chills and fever.   HENT:  Negative for ear pain and sore throat.    Respiratory:  Negative for cough and shortness of breath.    Cardiovascular:  Positive for chest pain. Negative for palpitations.   Gastrointestinal:  Positive for nausea. Negative for abdominal pain and vomiting.   Genitourinary:  Negative for dysuria and hematuria.   Musculoskeletal:  Negative for arthralgias and joint swelling.   Skin:  Negative for pallor and rash.   Neurological:  Negative for numbness and headaches.   Psychiatric/Behavioral:  Negative for confusion and hallucinations.      Included in HPI  All systems reviewed and negative except for those discussed in HPI.      PHYSICAL EXAM    I have reviewed the triage vital signs and nursing notes.    ED Triage Vitals   Temp Heart Rate Resp BP SpO2   09/12/24 1247 09/12/24 1247 09/12/24 1247 09/12/24 1252 09/12/24 1247   97.8 °F (36.6 °C) 86 16 137/52 97 %      Temp src Heart Rate Source Patient Position BP Location FiO2 (%)   09/12/24 1247 09/12/24 1247 -- -- --   Tympanic Monitor          Physical Exam  Constitutional:       General: She is not in acute distress.     Appearance: She is well-developed.   HENT:      Head: Normocephalic and atraumatic.   Eyes:      Extraocular Movements:  Extraocular movements intact.   Cardiovascular:      Rate and Rhythm: Normal rate and regular rhythm.      Heart sounds: Normal heart sounds.      Comments: Distal pulses intact  Pulmonary:      Effort: Pulmonary effort is normal.      Breath sounds: Normal breath sounds.   Abdominal:      General: There is no distension.   Skin:     General: Skin is warm.   Neurological:      General: No focal deficit present.      Mental Status: She is alert and oriented to person, place, and time.   Psychiatric:         Mood and Affect: Mood normal.           LAB RESULTS  Recent Results (from the past 24 hour(s))   ECG 12 Lead ED Triage Standing Order; Chest Pain    Collection Time: 09/12/24 12:51 PM   Result Value Ref Range    QT Interval 368 ms    QTC Interval 420 ms   Comprehensive Metabolic Panel    Collection Time: 09/12/24 12:52 PM    Specimen: Arm, Right; Blood   Result Value Ref Range    Glucose 110 (H) 65 - 99 mg/dL    BUN 14 8 - 23 mg/dL    Creatinine 1.05 (H) 0.57 - 1.00 mg/dL    Sodium 142 136 - 145 mmol/L    Potassium 4.4 3.5 - 5.2 mmol/L    Chloride 107 98 - 107 mmol/L    CO2 26.5 22.0 - 29.0 mmol/L    Calcium 9.5 8.6 - 10.5 mg/dL    Total Protein 6.4 6.0 - 8.5 g/dL    Albumin 3.9 3.5 - 5.2 g/dL    ALT (SGPT) 9 1 - 33 U/L    AST (SGOT) 12 1 - 32 U/L    Alkaline Phosphatase 146 (H) 39 - 117 U/L    Total Bilirubin <0.2 0.0 - 1.2 mg/dL    Globulin 2.5 gm/dL    A/G Ratio 1.6 g/dL    BUN/Creatinine Ratio 13.3 7.0 - 25.0    Anion Gap 8.5 5.0 - 15.0 mmol/L    eGFR 55.5 (L) >60.0 mL/min/1.73   High Sensitivity Troponin T    Collection Time: 09/12/24 12:52 PM    Specimen: Arm, Right; Blood   Result Value Ref Range    HS Troponin T 7 <14 ng/L   Green Top (Gel)    Collection Time: 09/12/24 12:52 PM   Result Value Ref Range    Extra Tube Hold for add-ons.    Lavender Top    Collection Time: 09/12/24 12:52 PM   Result Value Ref Range    Extra Tube hold for add-on    Gold Top - SST    Collection Time: 09/12/24 12:52 PM   Result  Value Ref Range    Extra Tube Hold for add-ons.    Light Blue Top    Collection Time: 09/12/24 12:52 PM   Result Value Ref Range    Extra Tube Hold for add-ons.    CBC Auto Differential    Collection Time: 09/12/24 12:52 PM    Specimen: Arm, Right; Blood   Result Value Ref Range    WBC 10.91 (H) 3.40 - 10.80 10*3/mm3    RBC 4.14 3.77 - 5.28 10*6/mm3    Hemoglobin 12.5 12.0 - 15.9 g/dL    Hematocrit 37.8 34.0 - 46.6 %    MCV 91.3 79.0 - 97.0 fL    MCH 30.2 26.6 - 33.0 pg    MCHC 33.1 31.5 - 35.7 g/dL    RDW 13.5 12.3 - 15.4 %    RDW-SD 45.5 37.0 - 54.0 fl    MPV 9.8 6.0 - 12.0 fL    Platelets 327 140 - 450 10*3/mm3    Neutrophil % 61.8 42.7 - 76.0 %    Lymphocyte % 28.3 19.6 - 45.3 %    Monocyte % 6.0 5.0 - 12.0 %    Eosinophil % 2.7 0.3 - 6.2 %    Basophil % 0.9 0.0 - 1.5 %    Immature Grans % 0.3 0.0 - 0.5 %    Neutrophils, Absolute 6.74 1.70 - 7.00 10*3/mm3    Lymphocytes, Absolute 3.09 0.70 - 3.10 10*3/mm3    Monocytes, Absolute 0.65 0.10 - 0.90 10*3/mm3    Eosinophils, Absolute 0.30 0.00 - 0.40 10*3/mm3    Basophils, Absolute 0.10 0.00 - 0.20 10*3/mm3    Immature Grans, Absolute 0.03 0.00 - 0.05 10*3/mm3    nRBC 0.0 0.0 - 0.2 /100 WBC         RADIOLOGY  XR Chest 1 View    Result Date: 9/12/2024  XR CHEST 1 VW-  HISTORY: Female who is 73 years-old, chest pain  TECHNIQUE: Frontal view of the chest  COMPARISON: 2/28/2020  FINDINGS: Heart size is normal. Aorta is calcified. Pulmonary vasculature is unremarkable. No focal pulmonary consolidation, pleural effusion, or pneumothorax. No acute osseous process.      No evidence for acute pulmonary process. Follow-up as clinical indications persist.  This report was finalized on 9/12/2024 1:22 PM by Dr. Paras Jaramillo M.D on Workstation: [id number]         MEDICATIONS GIVEN IN ER  Medications   sodium chloride 0.9 % flush 10 mL (has no administration in time range)   aspirin tablet 325 mg (325 mg Oral Given 9/12/24 1308)         ORDERS PLACED DURING THIS VISIT:  Orders  Placed This Encounter   Procedures    XR Chest 1 View    Anthon Draw    Comprehensive Metabolic Panel    High Sensitivity Troponin T    CBC Auto Differential    High Sensitivity Troponin T 2Hr    NPO Diet NPO Type: Strict NPO    Undress & Gown    Continuous Pulse Oximetry    Oxygen Therapy- Nasal Cannula; Titrate 1-6 LPM Per SpO2; 90 - 95%    ECG 12 Lead ED Triage Standing Order; Chest Pain    ECG 12 Lead ED Triage Standing Order; Chest Pain    Insert Peripheral IV    Initiate ED Observation Status    CBC & Differential    Green Top (Gel)    Lavender Top    Gold Top - SST    Light Blue Top         OUTPATIENT MEDICATION MANAGEMENT:  Current Facility-Administered Medications Ordered in Epic   Medication Dose Route Frequency Provider Last Rate Last Admin    sodium chloride 0.9 % flush 10 mL  10 mL Intravenous PRN Jim Roach MD         Current Outpatient Medications Ordered in Epic   Medication Sig Dispense Refill    ALPRAZolam (XANAX) 1 MG tablet Take 1 tablet by mouth 4 (Four) Times a Day.      atorvastatin (LIPITOR) 40 MG tablet Take 2 tablets by mouth Daily.      CHLORHEXIDINE GLUCONATE CLOTH EX Apply  topically. USE AS DIRECTED      Cholecalciferol (VITAMIN D3 PO) Take 1 tablet by mouth Daily. HOLDING FOR DOS      cycloSPORINE (RESTASIS) 0.05 % ophthalmic emulsion Administer 1 drop to both eyes Every 12 (Twelve) Hours.      escitalopram (LEXAPRO) 10 MG tablet TAKE 1 TABLET BY MOUTH DAILY AT 6 PM      famotidine (PEPCID) 40 MG tablet Take 1 tablet by mouth Daily. (Patient not taking: Reported on 10/9/2023)      folic acid (FOLVITE) 1 MG tablet Take 1 tablet by mouth Daily.      montelukast (SINGULAIR) 10 MG tablet Take 1 tablet by mouth Daily.      saccharomyces boulardii (FLORASTOR) 250 MG capsule Take 1 capsule by mouth Daily.      sucralfate (CARAFATE) 1 g tablet TAKE 1 TABLET BY MOUTH 15 MINUTES BEFORE FOOD AND EVERY NIGHT AT BEDTIME      ubrogepant (Ubrelvy) 100 MG tablet       zolpidem CR (AMBIEN CR)  12.5 MG CR tablet Take 1 tablet by mouth At Night As Needed.               PROGRESS, DATA ANALYSIS, CONSULTS, AND MEDICAL DECISION MAKING  All labs have been independently interpreted by me.  All radiology studies have been reviewed by me. All EKG's have been independently viewed and interpreted by me.  Discussion below represents my analysis of pertinent findings related to patient's condition, differential diagnosis, treatment plan and final disposition.    Differential diagnosis includes but is not limited to ACS, esophageal spasm, GERD, costochondritis.    Clinical Scores: HEART Score: 5                   ED Course as of 09/12/24 1337   Thu Sep 12, 2024   1332 Chest x-ray independently interpreted by me as no pneumothorax [MP]   1332 WBC(!): 10.91 [MP]   1332 Hemoglobin: 12.5 [MP]   1332 BUN: 14 [MP]   1332 Creatinine(!): 1.05 [MP]   1332 HS Troponin T: 7 [MP]   1334 I spoke with Lydia in the observation unit.  Discussed patient presentation and ED findings.  She agrees to admit patient to an ED observation bed. [MP]      ED Course User Index  [MP] Chey Monroe PA-C             AS OF 13:37 EDT VITALS:    BP - 137/52  HR - 86  TEMP - 97.8 °F (36.6 °C) (Tympanic)  O2 SATS - 97%    COMPLEXITY OF CARE  The patient requires admission.      DIAGNOSIS  Final diagnoses:   Acute chest pain         DISPOSITION  ED Disposition       ED Disposition   Decision to Admit    Condition   --    Comment   --                Please note that portions of this document were completed with a voice recognition program.    Note Disclaimer: At [facility], we believe that sharing information builds trust and better relationships. You are receiving this note because you recently visited [facility]. It is possible you will see health information before a provider has talked with you about it. This kind of information can be easy to misunderstand. To help you fully understand what it means for your health, we urge you to  discuss this note with your provider.         Chey Monroe PA-C  09/12/24 [id number]

## 2024-09-12 NOTE — PLAN OF CARE
Goal Outcome Evaluation:  Patient admitted to observation for chest pain that woke her from sleep this morning.Pain has resolved and she has only c/o mild soreness in left upper chest area. Patient will have CTA/ CT coronary tomorrow. Premeds for allergy to be given this evening at 2000.  Patient alert oriented. VSS this shift. Continuing plan of care.

## 2024-09-12 NOTE — CONSULTS
[facility]    Patient Name: Ellen Adamson  :1950  73 y.o.  LOS: 0  Encounter Provider: Preston Barlow MD      Patient Care Team:  Provider, No Known as PCP - General    Chief Complaint/Consult question:  Chest Pain    PMH/HPI:     Ellen Adamson is a 72 yo female with a past medical history notable for hypertension, hyperlipidemia, carotid artery stenosis, CKD, COPD and tobacco abuse.  She has never had an ischemic work-up.  Echocardiogram done in 2022 showed an EF of 62%, tricuspid regurgitation, RVSP 25.     She presented to the ED this afternoon with complaints of chest pain described as crushing and sharp, that woke her from sleep.  She had associated nausea and diaphoresis.  By the time she presented to the ED, she described her pain as more a soreness.      Work up  is notable for HS troponin of 7, creatinine 1.05.  Last A1C in 2023 6.20. CXR is negative. EKG shows sinus rhythm, no ischemic changes, and unchanged from previous.    I have been asked to evaluate her chest pain.  At the time of interview, patient was in no acute distress and denied ongoing chest pain.  She walks on a regular basis and able to do 3-4 blocks without significant symptoms.  She has been smoking 2 packs/day since age 13.  She denies alcohol or substance abuse.      ECHO 22  Calculated left ventricular EF = 61.8% Estimated left ventricular EF = 62% Estimated left ventricular EF was in agreement with the calculated left ventricular EF. Left ventricular systolic function is normal. Normal left ventricular cavity size and wall thickness noted. All left ventricular wall segments contract normally. Left ventricular diastolic function was normal.  Trace tricuspid valve regurgitation is present. Estimated right ventricular systolic pressure from tricuspid regurgitation is normal (<35 mmHg). Calculated right ventricular systolic pressure from tricuspid regurgitation is 25.1  mmHg.        Objective   Vital Signs  Temp:  [97.8 °F (36.6 °C)] 97.8 °F (36.6 °C)  Heart Rate:  [66-86] 66  Resp:  [16] 16  BP: (137-139)/(52-58) 139/58  No intake or output data in the 24 hours ending 09/12/24 1524        Vitals and nursing note reviewed.   Constitutional:       Appearance: Not in distress.   Neck:      Vascular: No JVR.   Pulmonary:      Effort: Pulmonary effort is normal.      Breath sounds: Normal breath sounds. No wheezing. No rhonchi. No rales.   Cardiovascular:      Normal rate. Regular rhythm.      Murmurs: There is no murmur.   Edema:     Peripheral edema absent.   Abdominal:      General: There is no distension.      Palpations: Abdomen is soft.      Tenderness: There is no abdominal tenderness.   Musculoskeletal:      Cervical back: Neck supple. Skin:     General: Skin is cool.   Neurological:      Mental Status: Alert and oriented to person, place and time.           Pertinent Test Results:  Results from last 7 days   Lab Units 09/12/24  1252   SODIUM mmol/L 142   POTASSIUM mmol/L 4.4   CHLORIDE mmol/L 107   CO2 mmol/L 26.5   BUN mg/dL 14   CREATININE mg/dL 1.05*   GLUCOSE mg/dL 110*   CALCIUM mg/dL 9.5   AST (SGOT) U/L 12   ALT (SGPT) U/L 9     Results from last 7 days   Lab Units 09/12/24  1252   HSTROP T ng/L 7     Results from last 7 days   Lab Units 09/12/24  1252   WBC 10*3/mm3 10.91*   HEMOGLOBIN g/dL 12.5   HEMATOCRIT % 37.8   PLATELETS 10*3/mm3 327             Results from last 7 days   Lab Units 09/12/24  1252   CHOLESTEROL mg/dL 238*   TRIGLYCERIDES mg/dL 333*   HDL CHOL mg/dL 40                   Medication Review:   ALPRAZolam, 1 mg, Oral, 4x Daily  [START ON 9/13/2024] atorvastatin, 80 mg, Oral, Daily  diphenhydrAMINE, 50 mg, Oral, Once When Specified   Or  diphenhydrAMINE, 50 mg, Intravenous, Once When Specified   Or  diphenhydrAMINE, 50 mg, Intramuscular, Once When Specified  donepezil, 10 mg, Oral, Nightly  [START ON 9/13/2024] escitalopram, 10 mg, Oral, Daily  [START  ON 9/13/2024] famotidine, 40 mg, Oral, Daily  [START ON 9/13/2024] folic acid, 1,000 mcg, Oral, Daily  memantine, 10 mg, Oral, Q12H  metoprolol tartrate, 50 mg, Oral, Q12H  [START ON 9/13/2024] montelukast, 10 mg, Oral, Daily  predniSONE, 50 mg, Oral, Once   Followed by  predniSONE, 50 mg, Oral, Once   Followed by  predniSONE, 50 mg, Oral, Once  QUEtiapine, 50 mg, Oral, Q12H  sodium chloride, 10 mL, Intravenous, Q12H  [START ON 9/13/2024] tamsulosin, 0.4 mg, Oral, Daily         [START ON 9/13/2024] sodium chloride, 75 mL/hr            9-12-24 9-19-23      Assessment & Plan     Active Hospital Problems    Diagnosis  POA    **Chest pain [R07.9]  Yes      Resolved Hospital Problems   No resolved problems to display.        Chest pain: Symptoms was concerning as she reported crushing chest pain that woke her up from night, although initial EKG was unremarkable and high sensitive troponin was negative.  She is now chest pain-free.  She does have significant risk factor with regard to extensive smoking history and suboptimally controlled hyperlipidemia.  We will further evaluate with echocardiogram as well as coronary CTA (shellfish allergy requiring premedication), we will also order Lopressor 50 x 2 for heart rate slowing in order to obtain better CT images.  Smoking sensation and lipid control, see below.  Hypertension: Not on any meds at home, may add at the time of discharge depending on trend.  Hyperlipidemia: Lipid panel this admission showed HDL 40, , poorly controlled.  Patient reports compliance on home atorvastatin 80 daily, plan to add Zetia 10 daily upon discharge and patient may eventually need further therapy escalation such as addition of PCSK9 inhibitor.  Prediabetes: Hemoglobin A1c 5.9 this admission.  Tobacco abuse: Patient reports 80-pack-year smoking history, we discussed the danger of continued smoking, although it is unclear whether she is truly motivated to quit.    Preston Barlow,  MD [facility]  09/12/24  14:39 EDT

## 2024-09-12 NOTE — ED NOTES
Nursing report ED to floor  Ellen Adamson  73 y.o.  female    HPI :  HPI (Adult)  Stated Reason for Visit: cp  History Obtained From: patient    Chief Complaint  Chief Complaint   Patient presents with    Chest Pain     Chest pains last night and today states chest is sore        Admitting doctor:   Adán Perez MD    Admitting diagnosis:   The encounter diagnosis was Acute chest pain.    Code status:   Current Code Status       Date Active Code Status Order ID Comments User Context       Prior            Allergies:   Iodine and Shellfish-derived products    Isolation:   No active isolations    Intake and Output  No intake or output data in the 24 hours ending 09/12/24 1336    Weight:   There were no vitals filed for this visit.    Most recent vitals:   Vitals:    09/12/24 1247 09/12/24 1252   BP:  137/52   Pulse: 86    Resp: 16    Temp: 97.8 °F (36.6 °C)    TempSrc: Tympanic    SpO2: 97%        Active LDAs/IV Access:   Lines, Drains & Airways       Active LDAs       None                    Labs (abnormal labs have a star):   Labs Reviewed   COMPREHENSIVE METABOLIC PANEL - Abnormal; Notable for the following components:       Result Value    Glucose 110 (*)     Creatinine 1.05 (*)     Alkaline Phosphatase 146 (*)     eGFR 55.5 (*)     All other components within normal limits    Narrative:     GFR Normal >60  Chronic Kidney Disease <60  Kidney Failure <15    The GFR formula is only valid for adults with stable renal function between ages 18 and 70.   CBC WITH AUTO DIFFERENTIAL - Abnormal; Notable for the following components:    WBC 10.91 (*)     All other components within normal limits   TROPONIN - Normal    Narrative:     High Sensitive Troponin T Reference Range:  <14.0 ng/L- Negative Female for AMI  <22.0 ng/L- Negative Male for AMI  >=14 - Abnormal Female indicating possible myocardial injury.  >=22 - Abnormal Male indicating possible myocardial injury.   Clinicians would have to utilize clinical acumen,  EKG, Troponin, and serial changes to determine if it is an Acute Myocardial Infarction or myocardial injury due to an underlying chronic condition.        RAINBOW DRAW    Narrative:     The following orders were created for panel order [place] Draw.  Procedure                               Abnormality         Status                     ---------                               -----------         ------                     Green Top (Gel)[[id number]]                                  Final result               Lavender Top[[id number]]                                     Final result               Gold Top - SST[[id number]]                                   Final result               Light Blue Top[[id number]]                                   Final result                 Please view results for these tests on the individual orders.   HIGH SENSITIVITIY TROPONIN T 2HR   CBC AND DIFFERENTIAL    Narrative:     The following orders were created for panel order CBC & Differential.  Procedure                               Abnormality         Status                     ---------                               -----------         ------                     CBC Auto Differential[[id number]]        Abnormal            Final result                 Please view results for these tests on the individual orders.   GREEN TOP   LAVENDER TOP   GOLD TOP - SST   LIGHT BLUE TOP       EKG:   ECG 12 Lead ED Triage Standing Order; Chest Pain   Preliminary Result   HEART RATE=78  bpm   RR Xoqoatfa=073  ms   MI Iqormolg=538  ms   P Horizontal Axis=14  deg   P Front Axis=77  deg   QRSD Interval=83  ms   QT Duuykquk=908  ms   DToL=589  ms   QRS Axis=79  deg   T Wave Axis=73  deg   - ABNORMAL ECG -   Sinus rhythm   RAMON consider biatrial enlargement   Date and Time of Study:2024-09-12 12:51:12          Meds given in ED:   Medications   sodium chloride 0.9 % flush 10 mL (has no administration in time range)   aspirin tablet 325 mg (325 mg Oral Given  9/12/24 1308)       Imaging results:  XR Chest 1 View    Result Date: 9/12/2024  No evidence for acute pulmonary process. Follow-up as clinical indications persist.  This report was finalized on 9/12/2024 1:22 PM by Dr. Paras Jaramillo M.D on Workstation: [id number]       Ambulatory status:       Social issues:   Social History     Socioeconomic History    Marital status:      Spouse name: Ramírez    Number of children: 3   Tobacco Use    Smoking status: Every Day     Current packs/day: 1.00     Average packs/day: 1 pack/day for 60.7 years (60.7 ttl pk-yrs)     Types: Cigarettes     Start date: 1964     Passive exposure: Past   Vaping Use    Vaping status: Never Used   Substance and Sexual Activity    Alcohol use: Yes     Comment: OCCAS    Drug use: Never    Sexual activity: Defer       Peripheral Neurovascular  Peripheral Neurovascular (Adult)  Peripheral Neurovascular WDL: WDL    Neuro Cognitive  Neuro Cognitive (Adult)  Cognitive/Neuro/Behavioral WDL: WDL    Learning       Respiratory  Respiratory WDL  Respiratory WDL: WDL    Abdominal Pain       Pain Assessments  Pain (Adult)  (0-10) Pain Rating: Rest: 4    NIH Stroke Scale       Helen Gomez RN  09/12/24 13:36 EDT

## 2024-09-12 NOTE — H&P
[facility]   HISTORY AND PHYSICAL    Patient Name: Ellen Adamson  : 1950  MRN: [id number]  Primary Care Physician:  Provider, No Known  Date of admission: 2024    Subjective   Subjective     Chief Complaint:   Chief Complaint   Patient presents with    Chest Pain     Chest pains last night and today states chest is sore          HPI:    Ellen Adamson is a 73 y.o. female with history of anxiety and depression, dementia, CKD, GERD, hyperlipidemia, and COPD with continued tobacco use, who presented to the emergency department today complaining of chest pain.  Patient reports she awoke around 4 AM with sharp, crushing chest pain.  Patient reports that she had associated nausea and diaphoresis.  Denies shortness of breath.  Denies radiation of pain.  Reports pain eased to a dull soreness.  Patient reports she went around her day as usual, took all her a.m. medications.  However, chest soreness failed to improve so she presented to emergency department.  Patient denies history of coronary artery disease, states that she has never had stress test or cardiac workup in the past.  Patient reports that she smokes 1 pack/day.    ED evaluation reviewed, initial high-sensitivity troponin 7.  Creatinine 1.05.  WBC 10.91.  Chest x-ray negative acute.  EKG shows sinus rhythm with rate of 80 bpm, no acute injury pattern.  Patient is being admitted to observation unit for further evaluation and treatment, cardiology consultation.    Review of Systems   All systems were reviewed and negative except for: What is discussed in the HPI    Personal History     Past Medical History:   Diagnosis Date    Anxiety and depression     Arthritis     OSTEOARTHRITIS    Asthma     NO MEDS    Cancer     BASAL CELL ON FACE    CKD (chronic kidney disease) 2019    STAGE 3    COPD (chronic obstructive pulmonary disease)     GERD (gastroesophageal reflux disease)     Hyperlipidemia     Lumbar stenosis     Sjogren's syndrome         Past Surgical History:   Procedure Laterality Date    ANKLE SURGERY      RIGHT    APPENDECTOMY      CHOLECYSTECTOMY      COLONOSCOPY      ELBOW PROCEDURE Left     ENDOSCOPY      HYSTERECTOMY      TOTAL HIP ARTHROPLASTY Right 2/10/2020    Procedure: TOTAL HIP ARTHROPLASTY ANTERIOR WITH HANA TABLE;  Surgeon: Jann Khan MD;  Location: [facility] OR;  Service: Orthopedics;  Laterality: Right;       Family History: family history includes No Known Problems in her father and mother. Otherwise pertinent FHx was reviewed and not pertinent to current issue.    Social History:  reports that she has been smoking cigarettes. She started smoking about 60 years ago. She has a 60.7 pack-year smoking history. She has been exposed to tobacco smoke. She does not have any smokeless tobacco history on file. She reports current alcohol use. She reports that she does not use drugs.    Home Medications:  ALPRAZolam, Chlorhexidine Gluconate, Cholecalciferol, QUEtiapine, atorvastatin, cycloSPORINE, donepezil, escitalopram, famotidine, folic acid, memantine, montelukast, saccharomyces boulardii, sucralfate, tamsulosin, ubrogepant, and zolpidem CR    Allergies:  Allergies   Allergen Reactions    Iodine Nausea And Vomiting    Shellfish-Derived Products Nausea And Vomiting       Objective   Objective     Vitals:   Temp:  [97.8 °F (36.6 °C)] 97.8 °F (36.6 °C)  Heart Rate:  [66-86] 66  Resp:  [16] 16  BP: (137-139)/(52-58) 139/58  Physical Exam     GENERAL: 73 y.o. YO female a/o x 4, NAD  SKIN: Warm pink and dry   HEENT:  PERRL, EOM intact, conjunctivae normal, sclerae clear  NECK: supple, no JVD  LUNGS: Clear to auscultation bilaterally without wheezes, rales or rhonchi.  No accessory muscle use and no nasal flaring.  CARDIAC:  Regular rate and rhythm, S1-S2.  No murmurs, rubs or gallops.  No peripheral edema.  Equal pulses bilaterally.  ABDOMEN: Soft, nontender, nondistended.  No guarding or rebound tenderness.  Normal bowel  sounds.  MUSCULOSKELETAL: Moves all extremities well.  No deformity.  NEURO: Cranial nerves II through XII grossly intact.  No gross focal deficits.  Alert.  Normal speech and motor.  PSYCH: Normal mood and affect      Result Review    Result Review:  I have personally reviewed the results from the time of this admission to 9/12/2024 15:10 EDT and agree with these findings:  [x]  Laboratory list / accordion  []  Microbiology  [x]  Radiology  [x]  EKG/Telemetry   []  Cardiology/Vascular   []  Pathology  []  Old records  []  Other:  Most notable findings include: High-sensitivity troponin 7    XR Chest 1 View    Result Date: 9/12/2024  No evidence for acute pulmonary process. Follow-up as clinical indications persist.  This report was finalized on 9/12/2024 1:22 PM by Dr. Paras Jaramillo M.D on Workstation: [id number]           Assessment & Plan   Assessment / Plan     Brief Patient Summary:  Ellen Adamson is a 73 y.o. female who is being admitted observation unit for further evaluation of chest pain.  Patient has never had cardiac workup in the past.  She smokes 1 pack/day.    Active Hospital Problems:  Active Hospital Problems    Diagnosis     **Chest pain      Plan:     Chest pain  -Consult cardiology  -High sensitivity troponin 7, trend  -ECG reviewed, nonischemic, repeat in a.m.  -N.p.o. after midnight, IVF  -Nitroglycerin as needed chest pain  -Monitor    Hyperlipidemia  -Check lipid panel  -Continue statin    Dementia  -Continue Aricept, Namenda    Anxiety depression  -Continue home medications as prescribed    Tobacco use  -Smokes 1 pack/day  -Patient requests nicotine patch- if plans for stress test tomorrow, nicotine patch will be removed   -Counseled extensively on cessation      VTE Prophylaxis:  Mechanical VTE prophylaxis orders are present.        CODE STATUS:    Level Of Support Discussed With: Patient  Code Status (Patient has no pulse and is not breathing): CPR (Attempt to Resuscitate)  Medical  Interventions (Patient has pulse or is breathing): Full Support    Admission Status:  I believe this patient meets observation status.     75 minutes has been spent by [facility] providers in the care of this patient while under observation status    I wore an appropriate PPE during this patient encounter.  Hand hygeine performed before and after seeing the patient.    Electronically signed by ELVER Sky, 09/12/24, 3:10 PM EDT.

## 2024-09-13 ENCOUNTER — APPOINTMENT (OUTPATIENT)
Dept: CARDIOLOGY | Facility: HOSPITAL | Age: 74
End: 2024-09-13
Payer: MEDICARE

## 2024-09-13 ENCOUNTER — APPOINTMENT (OUTPATIENT)
Dept: CT IMAGING | Facility: HOSPITAL | Age: 74
End: 2024-09-13
Payer: MEDICARE

## 2024-09-13 VITALS
DIASTOLIC BLOOD PRESSURE: 59 MMHG | HEIGHT: 63 IN | OXYGEN SATURATION: 96 % | BODY MASS INDEX: 34.38 KG/M2 | RESPIRATION RATE: 16 BRPM | WEIGHT: 194 LBS | HEART RATE: 68 BPM | SYSTOLIC BLOOD PRESSURE: 111 MMHG | TEMPERATURE: 97.9 F

## 2024-09-13 LAB
ANION GAP SERPL CALCULATED.3IONS-SCNC: 10.9 MMOL/L (ref 5–15)
AORTIC ARCH: 2.2 CM
AORTIC DIMENSIONLESS INDEX: 1.1 (DI)
ASCENDING AORTA: 3.2 CM
BH CV ECHO MEAS - ACS: 1.99 CM
BH CV ECHO MEAS - AO MAX PG: 8.1 MMHG
BH CV ECHO MEAS - AO MEAN PG: 4.9 MMHG
BH CV ECHO MEAS - AO ROOT DIAM: 3.1 CM
BH CV ECHO MEAS - AO V2 MAX: 142.5 CM/SEC
BH CV ECHO MEAS - AO V2 VTI: 29.5 CM
BH CV ECHO MEAS - AVA(I,D): 2.49 CM2
BH CV ECHO MEAS - EDV(CUBED): 71.8 ML
BH CV ECHO MEAS - EDV(MOD-SP2): 57 ML
BH CV ECHO MEAS - EDV(MOD-SP4): 50 ML
BH CV ECHO MEAS - EF(MOD-BP): 65.6 %
BH CV ECHO MEAS - EF(MOD-SP2): 64.9 %
BH CV ECHO MEAS - EF(MOD-SP4): 66 %
BH CV ECHO MEAS - ESV(CUBED): 21.3 ML
BH CV ECHO MEAS - ESV(MOD-SP2): 20 ML
BH CV ECHO MEAS - ESV(MOD-SP4): 17 ML
BH CV ECHO MEAS - FS: 33.3 %
BH CV ECHO MEAS - IVS/LVPW: 0.68 CM
BH CV ECHO MEAS - IVSD: 0.71 CM
BH CV ECHO MEAS - LA DIMENSION: 2.6 CM
BH CV ECHO MEAS - LAT PEAK E' VEL: 5.3 CM/SEC
BH CV ECHO MEAS - LV DIASTOLIC VOL/BSA (35-75): 26.2 CM2
BH CV ECHO MEAS - LV MASS(C)D: 112.8 GRAMS
BH CV ECHO MEAS - LV MAX PG: 5.5 MMHG
BH CV ECHO MEAS - LV MEAN PG: 2.48 MMHG
BH CV ECHO MEAS - LV SYSTOLIC VOL/BSA (12-30): 8.9 CM2
BH CV ECHO MEAS - LV V1 MAX: 117.1 CM/SEC
BH CV ECHO MEAS - LV V1 VTI: 24.5 CM
BH CV ECHO MEAS - LVIDD: 4.2 CM
BH CV ECHO MEAS - LVIDS: 2.8 CM
BH CV ECHO MEAS - LVOT AREA: 3 CM2
BH CV ECHO MEAS - LVOT DIAM: 1.96 CM
BH CV ECHO MEAS - LVPWD: 1.05 CM
BH CV ECHO MEAS - MED PEAK E' VEL: 6 CM/SEC
BH CV ECHO MEAS - MV A DUR: 0.1 SEC
BH CV ECHO MEAS - MV A MAX VEL: 113.2 CM/SEC
BH CV ECHO MEAS - MV DEC SLOPE: 423.1 CM/SEC2
BH CV ECHO MEAS - MV DEC TIME: 0.2 SEC
BH CV ECHO MEAS - MV E MAX VEL: 82 CM/SEC
BH CV ECHO MEAS - MV E/A: 0.72
BH CV ECHO MEAS - MV MAX PG: 4 MMHG
BH CV ECHO MEAS - MV MEAN PG: 2.25 MMHG
BH CV ECHO MEAS - MV P1/2T: 72.3 MSEC
BH CV ECHO MEAS - MV V2 VTI: 27.2 CM
BH CV ECHO MEAS - MVA(P1/2T): 3 CM2
BH CV ECHO MEAS - MVA(VTI): 2.7 CM2
BH CV ECHO MEAS - PA ACC TIME: 0.17 SEC
BH CV ECHO MEAS - PA V2 MAX: 118.1 CM/SEC
BH CV ECHO MEAS - QP/QS: 1.26
BH CV ECHO MEAS - RV MAX PG: 3.4 MMHG
BH CV ECHO MEAS - RV V1 MAX: 92.4 CM/SEC
BH CV ECHO MEAS - RV V1 VTI: 20.8 CM
BH CV ECHO MEAS - RVOT DIAM: 2.38 CM
BH CV ECHO MEAS - SUP REN AO DIAM: 1.6 CM
BH CV ECHO MEAS - SV(LVOT): 73.6 ML
BH CV ECHO MEAS - SV(MOD-SP2): 37 ML
BH CV ECHO MEAS - SV(MOD-SP4): 33 ML
BH CV ECHO MEAS - SV(RVOT): 92.9 ML
BH CV ECHO MEAS - SVI(LVOT): 38.5 ML/M2
BH CV ECHO MEAS - SVI(MOD-SP2): 19.4 ML/M2
BH CV ECHO MEAS - SVI(MOD-SP4): 17.3 ML/M2
BH CV ECHO MEAS - TAPSE (>1.6): 2.09 CM
BH CV ECHO MEASUREMENTS AVERAGE E/E' RATIO: 14.51
BH CV LOWER VASCULAR LEFT COMMON FEMORAL AUGMENT: NORMAL
BH CV LOWER VASCULAR LEFT COMMON FEMORAL COMPETENT: NORMAL
BH CV LOWER VASCULAR LEFT COMMON FEMORAL COMPRESS: NORMAL
BH CV LOWER VASCULAR LEFT COMMON FEMORAL PHASIC: NORMAL
BH CV LOWER VASCULAR LEFT COMMON FEMORAL SPONT: NORMAL
BH CV LOWER VASCULAR LEFT DISTAL FEMORAL COMPRESS: NORMAL
BH CV LOWER VASCULAR LEFT GASTRONEMIUS COMPRESS: NORMAL
BH CV LOWER VASCULAR LEFT GREATER SAPH AK COMPRESS: NORMAL
BH CV LOWER VASCULAR LEFT GREATER SAPH BK COMPRESS: NORMAL
BH CV LOWER VASCULAR LEFT LESSER SAPH COMPRESS: NORMAL
BH CV LOWER VASCULAR LEFT MID FEMORAL AUGMENT: NORMAL
BH CV LOWER VASCULAR LEFT MID FEMORAL COMPETENT: NORMAL
BH CV LOWER VASCULAR LEFT MID FEMORAL COMPRESS: NORMAL
BH CV LOWER VASCULAR LEFT MID FEMORAL PHASIC: NORMAL
BH CV LOWER VASCULAR LEFT MID FEMORAL SPONT: NORMAL
BH CV LOWER VASCULAR LEFT PERONEAL COMPRESS: NORMAL
BH CV LOWER VASCULAR LEFT POPLITEAL AUGMENT: NORMAL
BH CV LOWER VASCULAR LEFT POPLITEAL COMPETENT: NORMAL
BH CV LOWER VASCULAR LEFT POPLITEAL COMPRESS: NORMAL
BH CV LOWER VASCULAR LEFT POPLITEAL PHASIC: NORMAL
BH CV LOWER VASCULAR LEFT POPLITEAL SPONT: NORMAL
BH CV LOWER VASCULAR LEFT POSTERIOR TIBIAL COMPRESS: NORMAL
BH CV LOWER VASCULAR LEFT PROFUNDA FEMORAL COMPRESS: NORMAL
BH CV LOWER VASCULAR LEFT PROXIMAL FEMORAL COMPRESS: NORMAL
BH CV LOWER VASCULAR LEFT SAPHENOFEMORAL JUNCTION COMPRESS: NORMAL
BH CV LOWER VASCULAR RIGHT COMMON FEMORAL AUGMENT: NORMAL
BH CV LOWER VASCULAR RIGHT COMMON FEMORAL COMPETENT: NORMAL
BH CV LOWER VASCULAR RIGHT COMMON FEMORAL COMPRESS: NORMAL
BH CV LOWER VASCULAR RIGHT COMMON FEMORAL PHASIC: NORMAL
BH CV LOWER VASCULAR RIGHT COMMON FEMORAL SPONT: NORMAL
BH CV LOWER VASCULAR RIGHT DISTAL FEMORAL COMPRESS: NORMAL
BH CV LOWER VASCULAR RIGHT GASTRONEMIUS COMPRESS: NORMAL
BH CV LOWER VASCULAR RIGHT GREATER SAPH AK COMPRESS: NORMAL
BH CV LOWER VASCULAR RIGHT GREATER SAPH BK COMPRESS: NORMAL
BH CV LOWER VASCULAR RIGHT LESSER SAPH COMPRESS: NORMAL
BH CV LOWER VASCULAR RIGHT MID FEMORAL AUGMENT: NORMAL
BH CV LOWER VASCULAR RIGHT MID FEMORAL COMPETENT: NORMAL
BH CV LOWER VASCULAR RIGHT MID FEMORAL COMPRESS: NORMAL
BH CV LOWER VASCULAR RIGHT MID FEMORAL PHASIC: NORMAL
BH CV LOWER VASCULAR RIGHT MID FEMORAL SPONT: NORMAL
BH CV LOWER VASCULAR RIGHT PERONEAL COMPRESS: NORMAL
BH CV LOWER VASCULAR RIGHT POPLITEAL AUGMENT: NORMAL
BH CV LOWER VASCULAR RIGHT POPLITEAL COMPETENT: NORMAL
BH CV LOWER VASCULAR RIGHT POPLITEAL COMPRESS: NORMAL
BH CV LOWER VASCULAR RIGHT POPLITEAL PHASIC: NORMAL
BH CV LOWER VASCULAR RIGHT POPLITEAL SPONT: NORMAL
BH CV LOWER VASCULAR RIGHT POSTERIOR TIBIAL COMPRESS: NORMAL
BH CV LOWER VASCULAR RIGHT PROFUNDA FEMORAL COMPRESS: NORMAL
BH CV LOWER VASCULAR RIGHT PROXIMAL FEMORAL COMPRESS: NORMAL
BH CV LOWER VASCULAR RIGHT SAPHENOFEMORAL JUNCTION COMPRESS: NORMAL
BH CV XLRA - RV BASE: 2.42 CM
BH CV XLRA - RV LENGTH: 6.8 CM
BH CV XLRA - RV MID: 2.2 CM
BH CV XLRA - TDI S': 10.3 CM/SEC
BUN SERPL-MCNC: 13 MG/DL (ref 8–23)
BUN/CREAT SERPL: 12.9 (ref 7–25)
CALCIUM SPEC-SCNC: 9.4 MG/DL (ref 8.6–10.5)
CHLORIDE SERPL-SCNC: 108 MMOL/L (ref 98–107)
CO2 SERPL-SCNC: 21.1 MMOL/L (ref 22–29)
CREAT SERPL-MCNC: 1.01 MG/DL (ref 0.57–1)
DEPRECATED RDW RBC AUTO: 45.3 FL (ref 37–54)
EGFRCR SERPLBLD CKD-EPI 2021: 58.2 ML/MIN/1.73
ERYTHROCYTE [DISTWIDTH] IN BLOOD BY AUTOMATED COUNT: 13.2 % (ref 12.3–15.4)
GLUCOSE SERPL-MCNC: 163 MG/DL (ref 65–99)
HCT VFR BLD AUTO: 36.7 % (ref 34–46.6)
HGB BLD-MCNC: 12 G/DL (ref 12–15.9)
LEFT ATRIUM VOLUME INDEX: 21.4 ML/M2
MCH RBC QN AUTO: 29.9 PG (ref 26.6–33)
MCHC RBC AUTO-ENTMCNC: 32.7 G/DL (ref 31.5–35.7)
MCV RBC AUTO: 91.5 FL (ref 79–97)
PLATELET # BLD AUTO: 305 10*3/MM3 (ref 140–450)
PMV BLD AUTO: 9.9 FL (ref 6–12)
POTASSIUM SERPL-SCNC: 4.5 MMOL/L (ref 3.5–5.2)
QT INTERVAL: 392 MS
QTC INTERVAL: 444 MS
RBC # BLD AUTO: 4.01 10*6/MM3 (ref 3.77–5.28)
SINUS: 3.1 CM
SODIUM SERPL-SCNC: 140 MMOL/L (ref 136–145)
STJ: 2.9 CM
TROPONIN T SERPL HS-MCNC: 7 NG/L
WBC NRBC COR # BLD AUTO: 8.38 10*3/MM3 (ref 3.4–10.8)

## 2024-09-13 PROCEDURE — 93005 ELECTROCARDIOGRAM TRACING: CPT | Performed by: PHYSICIAN ASSISTANT

## 2024-09-13 PROCEDURE — 25810000003 SODIUM CHLORIDE 0.9 % SOLUTION: Performed by: PHYSICIAN ASSISTANT

## 2024-09-13 PROCEDURE — G0378 HOSPITAL OBSERVATION PER HR: HCPCS

## 2024-09-13 PROCEDURE — 25510000001 IOPAMIDOL PER 1 ML: Performed by: EMERGENCY MEDICINE

## 2024-09-13 PROCEDURE — 85027 COMPLETE CBC AUTOMATED: CPT | Performed by: PHYSICIAN ASSISTANT

## 2024-09-13 PROCEDURE — 93970 EXTREMITY STUDY: CPT

## 2024-09-13 PROCEDURE — 75574 CT ANGIO HRT W/3D IMAGE: CPT

## 2024-09-13 PROCEDURE — 25810000003 SODIUM CHLORIDE 0.9 % SOLUTION: Performed by: INTERNAL MEDICINE

## 2024-09-13 PROCEDURE — 93970 EXTREMITY STUDY: CPT | Performed by: SURGERY

## 2024-09-13 PROCEDURE — 93306 TTE W/DOPPLER COMPLETE: CPT | Performed by: INTERNAL MEDICINE

## 2024-09-13 PROCEDURE — 25510000001 PERFLUTREN 6.52 MG/ML SUSPENSION 2 ML VIAL: Performed by: INTERNAL MEDICINE

## 2024-09-13 PROCEDURE — 93010 ELECTROCARDIOGRAM REPORT: CPT | Performed by: INTERNAL MEDICINE

## 2024-09-13 PROCEDURE — 75574 CT ANGIO HRT W/3D IMAGE: CPT | Performed by: INTERNAL MEDICINE

## 2024-09-13 PROCEDURE — 80048 BASIC METABOLIC PNL TOTAL CA: CPT | Performed by: PHYSICIAN ASSISTANT

## 2024-09-13 PROCEDURE — 99214 OFFICE O/P EST MOD 30 MIN: CPT | Performed by: INTERNAL MEDICINE

## 2024-09-13 PROCEDURE — 93306 TTE W/DOPPLER COMPLETE: CPT

## 2024-09-13 PROCEDURE — 84484 ASSAY OF TROPONIN QUANT: CPT | Performed by: PHYSICIAN ASSISTANT

## 2024-09-13 PROCEDURE — 63710000001 PREDNISONE PER 1 MG: Performed by: PHYSICIAN ASSISTANT

## 2024-09-13 RX ORDER — IOPAMIDOL 755 MG/ML
100 INJECTION, SOLUTION INTRAVASCULAR
Status: COMPLETED | OUTPATIENT
Start: 2024-09-13 | End: 2024-09-13

## 2024-09-13 RX ORDER — EZETIMIBE 10 MG/1
10 TABLET ORAL DAILY
Qty: 90 TABLET | Refills: 0 | Status: SHIPPED | OUTPATIENT
Start: 2024-09-13

## 2024-09-13 RX ORDER — METOPROLOL TARTRATE 50 MG
100 TABLET ORAL ONCE
Status: COMPLETED | OUTPATIENT
Start: 2024-09-13 | End: 2024-09-13

## 2024-09-13 RX ORDER — NITROGLYCERIN 0.4 MG/1
0.8 TABLET SUBLINGUAL ONCE
Status: DISCONTINUED | OUTPATIENT
Start: 2024-09-13 | End: 2024-09-13 | Stop reason: HOSPADM

## 2024-09-13 RX ADMIN — SODIUM CHLORIDE 75 ML/HR: 9 INJECTION, SOLUTION INTRAVENOUS at 00:26

## 2024-09-13 RX ADMIN — IOPAMIDOL 75 ML: 755 INJECTION, SOLUTION INTRAVENOUS at 14:01

## 2024-09-13 RX ADMIN — ALPRAZOLAM 1 MG: 0.5 TABLET ORAL at 07:19

## 2024-09-13 RX ADMIN — PREDNISONE 50 MG: 20 TABLET ORAL at 07:19

## 2024-09-13 RX ADMIN — NITROGLYCERIN 0.8 MG: 0.4 TABLET SUBLINGUAL at 14:00

## 2024-09-13 RX ADMIN — PREDNISONE 50 MG: 20 TABLET ORAL at 00:29

## 2024-09-13 RX ADMIN — METOPROLOL TARTRATE 100 MG: 50 TABLET, FILM COATED ORAL at 09:52

## 2024-09-13 RX ADMIN — PERFLUTREN 2 ML: 6.52 INJECTION, SUSPENSION INTRAVENOUS at 08:40

## 2024-09-13 RX ADMIN — SODIUM CHLORIDE 250 ML: 9 INJECTION, SOLUTION INTRAVENOUS at 10:51

## 2024-09-13 NOTE — PROGRESS NOTES
I did see this patient yesterday I am seeing the patient again today.  She is a patient who woke up yesterday at 4 AM that had sharp crushing chest pain.  It was of brief duration she did not have any shortness of breath did have some nausea.  She does have some risk factors for coronary artery disease she is still an active smoker and does have COPD.  Throughout her stay in the observation unit she has remained chest pain-free.  She has essentially has been chest pain-free since about 5 or 6 AM yesterday.  Her troponins have remained stable and lab work is unremarkable.  Cardiology has seen the patient the plan is for an echo and a coronary artery CT angiogram.  Discussed this plan with the spouse who is at bedside.  Depending upon those results will depend upon further treatment.  All questions answered at this time.

## 2024-09-13 NOTE — PLAN OF CARE
Goal Outcome Evaluation:      Pt. C/o of chest soreness. Echo and Duplex Venous LE ordered for today. Premeds given for CT. NS infusing @75mL.

## 2024-09-13 NOTE — PROGRESS NOTES
I saw this patient yesterday please see my cosign note from yesterday with the MLP.  I saw the patient again today.  She is admitted to the observation unit for evaluation of her chest pain and for cardiology consultation.  Throughout the night she has remained chest pain-free.  She has multiple risk factors for heart disease.  She also suffers from chronic low back pain.  She is not had any of her Tylenol or Motrin she reports and is having some back pain and would like some medicine for that.  She denies any saddle anesthesia any urinary or fecal incontinence or retention and any new weakness numbness or tingling.  Again she has had no chest pain or shortness of breath throughout her stay in the ED in the observation unit.  On repeat exam her vitals remained unremarkable.  She is in no acute distress her heart is regular rate and rhythm lungs are clear to auscultation abdomen is soft and nontender.  She has good strong intact distal pulses that are equal strong and symmetric.  She has no focal motor or sensory changes.  Cardiology has seen the patient and the plan is to do a stress test on her today.  They would like to add a statin to her medical regime.  For her low back pain we Argun to give her some Tylenol and Toradol.  Depending upon the results of the stress test we will then decide further management.  We know she has coronary artery calcifications seen on the CT angiogram of the chest yesterday.  There was no acute abnormality seen such as thoracic aortic dissection or aneurysm or any PE on the CT angiogram of the chest yesterday.  Her troponins have remained flat.  Echo his also been ordered.

## 2024-09-13 NOTE — CASE MANAGEMENT/SOCIAL WORK
Case Management Discharge Note      Final Note: Dc home         Selected Continued Care - Discharged on 9/13/2024 Admission date: 9/12/2024 - Discharge disposition: Home or Self Care      Destination    No services have been selected for the patient.                Durable Medical Equipment    No services have been selected for the patient.                Dialysis/Infusion    No services have been selected for the patient.                Home Medical Care    No services have been selected for the patient.                Therapy    No services have been selected for the patient.                Community Resources    No services have been selected for the patient.                Community & DME    No services have been selected for the patient.                         Final Discharge Disposition Code: 01 - home or self-care

## 2024-09-13 NOTE — CASE MANAGEMENT/SOCIAL WORK
Discharge Planning Assessment  [facility]     Patient Name: Ellen Adamson  MRN: [id number]  Today's Date: 9/13/2024    Admit Date: 9/12/2024    Plan: Home, family to transport   Discharge Needs Assessment       Row Name 09/13/24 1017       Living Environment    People in Home spouse    Current Living Arrangements home    Primary Care Provided by self    Provides Primary Care For no one    Family Caregiver if Needed spouse    Able to Return to Prior Arrangements yes       Resource/Environmental Concerns    Resource/Environmental Concerns none       Transition Planning    Patient/Family Anticipates Transition to home with family    Patient/Family Anticipated Services at Transition none    Transportation Anticipated family or friend will provide       Discharge Needs Assessment    Equipment Currently Used at Home none    Concerns to be Addressed denies needs/concerns at this time;no discharge needs identified    Equipment Needed After Discharge none                   Discharge Plan       Row Name 09/13/24 1017       Plan    Plan Home, family to transport    Patient/Family in Agreement with Plan yes    Plan Comments Patient off floor, CCP spoke with patient's spouse Ramírez over the phone; explained role, verified facesheet, and discussed dc plan. Patient uses no DME and is independent for mobility at baseline. She lives with her spouse in a 1 level home with no steps to get in. She sees Grant Layne as her PCP. She has no history of HH or SNF. Patient's spouse denies any HH or DME needs at this time and reports plan is home via family transport. DANY GUDINO                  Continued Care and Services - Admitted Since 9/12/2024    No active coordination exists for this encounter.          Demographic Summary       Row Name 09/13/24 1016       General Information    Admission Type observation    Arrived From home    Referral Source admission list    Reason for Consult discharge planning    Preferred Language English        Contact Information    Permission Granted to Share Info With family/designee                   Functional Status       Row Name 09/13/24 1017       Functional Status    Usual Activity Tolerance good    Current Activity Tolerance good       Functional Status, IADL    Medications independent    Meal Preparation independent    Housekeeping independent    Laundry independent    Shopping independent       Mental Status    General Appearance WDL WDL                   Psychosocial    No documentation.                  Abuse/Neglect    No documentation.                  Legal    No documentation.                  Substance Abuse    No documentation.                  Patient Forms    No documentation.                     DANY Li

## 2024-09-13 NOTE — PROGRESS NOTES
[facility]    Patient Name: Ellen Adamson  :1950  73 y.o.  LOS: 0  Encounter Provider: Preston Barlow MD      Patient Care Team:  Provider, No Known as PCP - General    Chief Complaint/Consult question: Chest pain    HPI/PMH: 74 yo female with a past medical history notable for hypertension, hyperlipidemia, carotid artery stenosis, CKD, COPD and tobacco abuse.  She has never had an ischemic work-up.  Echocardiogram done in 2022 showed an EF of 62%, tricuspid regurgitation, RVSP 25.     She presented to the ED with complaints of chest pain described as crushing and sharp, that woke her from sleep.  She had associated nausea and diaphoresis.  By the time she presented to the ED, she described her pain as more a soreness.  Work up is notable for HS troponin of 7, creatinine 1.05.  CXR is negative. EKG shows sinus rhythm, no ischemic changes, and unchanged from previous. She walks on a regular basis and able to do 3-4 blocks without significant symptoms.  She has been smoking 2 packs/day since age 13.  She denies alcohol or substance abuse.    Interval History: No acute complaints, denies chest pain.  Echo this morning showed LVEF 66% with grade 1 diastolic dysfunction.  She is completing her premedication given shellfish allergy and getting beta-blocker this morning in preparation for coronary CTA.     Results for orders placed during the hospital encounter of 24    Adult Transthoracic Echo Complete W/ Cont if Necessary Per Protocol    Interpretation Summary    Left ventricular systolic function is normal. Calculated left ventricular EF = 65.6%    Left ventricular diastolic function is consistent with (grade I) impaired relaxation.    No significant valvular abnormalities.    Normal biatrial and IVC size.        Objective   Vital Signs  Temp:  [97.7 °F (36.5 °C)-98.3 °F (36.8 °C)] 97.9 °F (36.6 °C)  Heart Rate:  [62-86] 65  Resp:  [16-18] 18  BP: (103-139)/(42-58)  "116/49  No intake or output data in the 24 hours ending 09/13/24 1012  Flowsheet Rows      Flowsheet Row First Filed Value   Admission Height 162.2 cm (63.86\") Documented at 09/12/2024 1549   Admission Weight 88 kg (194 lb) Documented at 09/12/2024 1549              Vitals and nursing note reviewed.   Constitutional:       Appearance: Not in distress.      Comments: Morbidly obese   Neck:      Comments: JVD difficult to assess due to body habitus  Pulmonary:      Effort: Pulmonary effort is normal.   Cardiovascular:      Normal rate. Regular rhythm.      Murmurs: There is no murmur.   Edema:     Peripheral edema absent.   Abdominal:      General: There is distension.      Palpations: Abdomen is soft.      Tenderness: There is no abdominal tenderness.   Skin:     General: Skin is warm and cool.   Neurological:      Mental Status: Alert and oriented to person, place and time.           Pertinent Test Results:  Results from last 7 days   Lab Units 09/13/24  0351 09/12/24  1252   SODIUM mmol/L 140 142   POTASSIUM mmol/L 4.5 4.4   CHLORIDE mmol/L 108* 107   CO2 mmol/L 21.1* 26.5   BUN mg/dL 13 14   CREATININE mg/dL 1.01* 1.05*   GLUCOSE mg/dL 163* 110*   CALCIUM mg/dL 9.4 9.5   AST (SGOT) U/L  --  12   ALT (SGPT) U/L  --  9     Results from last 7 days   Lab Units 09/13/24  0351 09/12/24  1520 09/12/24  1252   HSTROP T ng/L 7 7 7     Results from last 7 days   Lab Units 09/13/24  0351 09/12/24  1252   WBC 10*3/mm3 8.38 10.91*   HEMOGLOBIN g/dL 12.0 12.5   HEMATOCRIT % 36.7 37.8   PLATELETS 10*3/mm3 305 327             Results from last 7 days   Lab Units 09/12/24  1252   CHOLESTEROL mg/dL 238*   TRIGLYCERIDES mg/dL 333*   HDL CHOL mg/dL 40                   Medication Review:   ALPRAZolam, 1 mg, Oral, 4x Daily  atorvastatin, 80 mg, Oral, Daily  donepezil, 10 mg, Oral, Nightly  escitalopram, 10 mg, Oral, Daily  famotidine, 40 mg, Oral, Daily  folic acid, 1,000 mcg, Oral, Daily  memantine, 10 mg, Oral, Q12H  montelukast, " 10 mg, Oral, Daily  nicotine, 1 patch, Transdermal, Q24H  nitroglycerin, 0.4 mg, Sublingual, Once in imaging   Or  nitroglycerin, 0.8 mg, Sublingual, Once in imaging  QUEtiapine, 50 mg, Oral, Q12H  sodium chloride, 10 mL, Intravenous, Q12H  tamsulosin, 0.4 mg, Oral, Daily         sodium chloride, 75 mL/hr, Last Rate: 75 mL/hr (09/13/24 0026)        Assessment & Plan     Active Hospital Problems    Diagnosis  POA    **Chest pain [R07.9]  Yes      Resolved Hospital Problems   No resolved problems to display.        Chest pain: Symptoms was concerning as she reported crushing chest pain that woke her up from night, although initial EKG was unremarkable and high sensitive troponin was negative.  She is now chest pain-free.  She does have significant risk factor with regard to extensive smoking history and suboptimally controlled hyperlipidemia.  Echo this morning was reassuring, planning for coronary CTA (shellfish allergy requiring premedication), patient is also getting oral Lopressor for heart rate slowing in preparation.  Smoking sensation and lipid control, see below.  Hypertension: Not on any meds at home, may add at the time of discharge depending on trend.  Hyperlipidemia: Lipid panel this admission showed HDL 40, , poorly controlled.  Patient reports compliance on home atorvastatin 80 daily, plan to add Zetia 10 daily upon discharge and patient may eventually need further therapy escalation such as addition of PCSK9 inhibitor.  Prediabetes: Hemoglobin A1c 5.9 this admission.  Tobacco abuse: Patient reports 80-pack-year smoking history, we discussed the danger of continued smoking, although it is unclear whether she is truly motivated to quit.    Plan of care discussed with patient, RN, and observational unit providers Dr. Perez and Carlos Guevara.      Preston Barlow MD  [facility]  09/13/24  10:12 EDT

## 2024-09-13 NOTE — PROGRESS NOTES
ED OBSERVATION PROGRESS/DISCHARGE SUMMARY    Date of Admission: 9/12/2024   LOS: 0 days   PCP: Provider, No Known    Final Diagnosis chest pain      Subjective     Hospital Outcome: Discharge      Ellen Adamson is a 73 y.o. female with history of anxiety and depression, dementia, CKD, GERD, hyperlipidemia, and COPD with continued tobacco use, who presented to the emergency department today complaining of chest pain.  Patient reports she awoke around 4 AM with sharp, crushing chest pain.  Patient reports that she had associated nausea and diaphoresis.  Denies shortness of breath.  Denies radiation of pain.  Reports pain eased to a dull soreness.  Patient reports she went around her day as usual, took all her a.m. medications.  However, chest soreness failed to improve so she presented to emergency department.  Patient denies history of coronary artery disease, states that she has never had stress test or cardiac workup in the past.  Patient reports that she smokes 1 pack/day.     ED evaluation reviewed, initial high-sensitivity troponin 7.  Creatinine 1.05.  WBC 10.91.  Chest x-ray negative acute.  EKG shows sinus rhythm with rate of 80 bpm, no acute injury pattern.  Patient is being admitted to observation unit for further evaluation and treatment, cardiology consultation.    ROS:  General: no fevers, chills  Respiratory: no cough, dyspnea  Cardiovascular: no chest pain, palpitations  Abdomen: No abdominal pain, nausea, vomiting, or diarrhea  Neurologic: No focal weakness    9/13/2024.    2:54 PM.  Patient has had a coronary CT scan and TTE echo.  She has been pain-free throughout her ED observation stay.  She has been cleared charge and close follow-up.  Cardiology office to arrange the follow-up.  Patient is to continue with her current statin.  I am going to add Zetia 10 mg daily given her lipid panel findings and cardiology's recommendations.  She is to continue with all other home medications as previously  prescribed.  Will stressed the importance of stopping tobacco use.  Will have her return to the ED with any further chest pain or should she have any further concerns.    Objective   Physical Exam:  I have reviewed the vital signs.  Temp:  [97.7 °F (36.5 °C)-98.3 °F (36.8 °C)] 97.9 °F (36.6 °C)  Heart Rate:  [58-81] 68  Resp:  [16-18] 16  BP: ()/(40-61) 111/59  General Appearance:    Alert, cooperative, no distress  Head:    Normocephalic, atraumatic  Eyes:    Sclerae anicteric  Neck:   Supple, no mass  Lungs: Clear to auscultation bilaterally, respirations unlabored  Heart: Regular rate and rhythm, S1 and S2 normal, no murmur, rub or gallop  Abdomen:  Soft, nontender, bowel sounds active, nondistended  Extremities: No clubbing, cyanosis, or edema to lower extremities  Pulses:  2+ and symmetric in distal lower extremities  Skin: No rashes   Neurologic: Oriented x3, Normal strength to extremities    Results Review:    I have reviewed the labs, radiology results and diagnostic studies.    Results from last 7 days   Lab Units 09/13/24  0351   WBC 10*3/mm3 8.38   HEMOGLOBIN g/dL 12.0   HEMATOCRIT % 36.7   PLATELETS 10*3/mm3 305     Results from last 7 days   Lab Units 09/13/24  0351 09/12/24  1252   SODIUM mmol/L 140 142   POTASSIUM mmol/L 4.5 4.4   CHLORIDE mmol/L 108* 107   CO2 mmol/L 21.1* 26.5   BUN mg/dL 13 14   CREATININE mg/dL 1.01* 1.05*   CALCIUM mg/dL 9.4 9.5   BILIRUBIN mg/dL  --  <0.2   ALK PHOS U/L  --  146*   ALT (SGPT) U/L  --  9   AST (SGOT) U/L  --  12   GLUCOSE mg/dL 163* 110*     Imaging Results (Last 24 Hours)       Procedure Component Value Units Date/Time    CT Angiogram Coronary [[id number]] Collected: 09/13/24 1517     Updated: 09/13/24 1522    Narrative:      RADIOLOGY INTERPRETATION OF EXTRACARDIAC STRUCTURES WITHIN THE CHEST        HISTORY: 73-year-old female with chest pain.        FINDINGS:  1. There are no airspace opacities, effusions, or lymphadenopathy within  the visualized  chest.     2. No incidental pulmonary thromboemboli are seen.     3. There is no hiatal hernia.           PLEASE SEE SEPARATE CARDIOLOGY REPORT OF THE CARDIAC FINDINGS.        Radiation dose reduction techniques were utilized, including automated  exposure control and exposure modulation based on body size.       This report was finalized on 9/13/2024 3:19 PM by Dr. Bita Rosado M.D on  Workstation: [id number]               I have reviewed the medications.  ---------------------------------------------------------------------------------------------  Assessment & Plan   Assessment/Problem List    Chest pain      Plan:    Chest pain  -Consult cardiology  -High sensitivity troponin 7, 7, 7  -ECG reviewed, nonischemic, repeat in a.m.  -N.p.o. after midnight, IVF  -Nitroglycerin as needed chest pain  -Monitor  -There is a plan for a coronary angio CT scanning later this afternoon   -Beta-blocker has been given to slow the heart down   -Patient cleared by cardiology.  Plan is as above.     Dementia  -Continue Aricept, Namenda     Anxiety depression  -Continue home medications as prescribed     Tobacco use  -Smokes 1 pack/day  -Patient requests nicotine patch- if plans for stress test tomorrow, nicotine patch will be removed   -Counseled extensively on cessation        VTE Prophylaxis:  Mechanical VTE prophylaxis orders are present.    Disposition: Discharge    Follow-up after Discharge: [facility], primary care    This note will serve as a progress note and discharge summary    Carlos Guevara III, PA 09/13/24 15:27 EDT    I have worn appropriate PPE during this patient encounter, sanitized my hands both with entering and exiting patient's room.      52 minutes has been spent by [facility] providers in the care of this patient while under observation status

## 2024-09-14 ENCOUNTER — READMISSION MANAGEMENT (OUTPATIENT)
Dept: CALL CENTER | Facility: HOSPITAL | Age: 74
End: 2024-09-14
Payer: MEDICARE

## 2024-09-14 NOTE — OUTREACH NOTE
Prep Survey      Flowsheet Row Responses   [facility] patient discharged from? [facility]   Is LACE score < 7 ? No   Eligibility Readm Mgmt   Discharge diagnosis Chest pain   Does the patient have one of the following disease processes/diagnoses(primary or secondary)? Other   Prep survey completed? Yes            Bree NESBITT - Registered Nurse

## 2024-09-19 ENCOUNTER — READMISSION MANAGEMENT (OUTPATIENT)
Dept: CALL CENTER | Facility: HOSPITAL | Age: 74
End: 2024-09-19
Payer: MEDICARE

## 2024-10-14 ENCOUNTER — OFFICE VISIT (OUTPATIENT)
Age: 74
End: 2024-10-14
Payer: MEDICARE

## 2024-10-14 VITALS
HEIGHT: 64 IN | WEIGHT: 191.4 LBS | BODY MASS INDEX: 32.68 KG/M2 | HEART RATE: 78 BPM | OXYGEN SATURATION: 97 % | RESPIRATION RATE: 14 BRPM | SYSTOLIC BLOOD PRESSURE: 124 MMHG | TEMPERATURE: 98 F | DIASTOLIC BLOOD PRESSURE: 70 MMHG

## 2024-10-14 DIAGNOSIS — F02.A0 MILD LATE ONSET ALZHEIMER'S DEMENTIA WITHOUT BEHAVIORAL DISTURBANCE, PSYCHOTIC DISTURBANCE, MOOD DISTURBANCE, OR ANXIETY: ICD-10-CM

## 2024-10-14 DIAGNOSIS — J43.1 PANLOBULAR EMPHYSEMA: ICD-10-CM

## 2024-10-14 DIAGNOSIS — G30.1 MILD LATE ONSET ALZHEIMER'S DEMENTIA WITHOUT BEHAVIORAL DISTURBANCE, PSYCHOTIC DISTURBANCE, MOOD DISTURBANCE, OR ANXIETY: ICD-10-CM

## 2024-10-14 DIAGNOSIS — F51.05 INSOMNIA RELATED TO ANOTHER MENTAL DISORDER: Primary | ICD-10-CM

## 2024-10-14 DIAGNOSIS — M35.00 SJOGREN'S SYNDROME, WITH UNSPECIFIED ORGAN INVOLVEMENT: ICD-10-CM

## 2024-10-14 DIAGNOSIS — Z87.891 HISTORY OF TOBACCO USE: ICD-10-CM

## 2024-10-14 DIAGNOSIS — Z13.820 SCREENING FOR OSTEOPOROSIS: ICD-10-CM

## 2024-10-14 DIAGNOSIS — N18.31 STAGE 3A CHRONIC KIDNEY DISEASE: ICD-10-CM

## 2024-10-14 PROCEDURE — 99214 OFFICE O/P EST MOD 30 MIN: CPT | Performed by: FAMILY MEDICINE

## 2024-10-14 RX ORDER — MIRTAZAPINE 7.5 MG/1
7.5 TABLET, FILM COATED ORAL NIGHTLY
Qty: 30 TABLET | Refills: 0 | Status: SHIPPED | OUTPATIENT
Start: 2024-10-14

## 2024-10-14 NOTE — PROGRESS NOTES
"Chief Complaint  Insomnia    Subjective        Ellen Adamson presents to [facility]  History of Present Illness  The patient is here because she is having problems sleeping.  Patient is a 73-year-old white female who was in her normal state of health until approximately 3 to 4 months ago when she developed problems with insomnia.  The insomnia has persisted.  The patient has taken Seroquel before in the past grams daily and that was not helpful.  The patient does have a history of memory loss and her  states that she is sleeping an hour at a time and then waking up for 2 to 3 hours and then going back to bed for an hour and sleeping and then waking up again in the same fashion.  The patient states that she does not feel rested when she wakes up and that she is not getting good restorative sleep.    Objective   Vital Signs:  /70 (BP Location: Left arm, Patient Position: Sitting, Cuff Size: Adult)   Pulse 78   Temp 98 °F (36.7 °C) (Temporal)   Resp 14   Ht 162.6 cm (64\")   Wt 86.8 kg (191 lb 6.4 oz)   SpO2 97%   BMI 32.85 kg/m²   Estimated body mass index is 32.85 kg/m² as calculated from the following:    Height as of this encounter: 162.6 cm (64\").    Weight as of this encounter: 86.8 kg (191 lb 6.4 oz).    BMI is >= 30 and <35. (Class 1 Obesity). The following options were offered after discussion;: weight loss educational material (shared in after visit summary), exercise counseling/recommendations, and nutrition counseling/recommendations      Physical Exam  Constitutional:       General: She is not in acute distress.     Appearance: Normal appearance. She is not ill-appearing, toxic-appearing or diaphoretic.   HENT:      Head: Normocephalic and atraumatic.      Right Ear: There is no impacted cerumen.      Left Ear: There is no impacted cerumen.      Nose: No congestion or rhinorrhea.      Mouth/Throat:      Pharynx: Oropharynx is clear. No oropharyngeal " exudate or posterior oropharyngeal erythema.   Eyes:      General: No scleral icterus.        Right eye: No discharge.         Left eye: No discharge.      Extraocular Movements: Extraocular movements intact.      Conjunctiva/sclera: Conjunctivae normal.      Pupils: Pupils are equal, round, and reactive to light.   Cardiovascular:      Rate and Rhythm: Normal rate and regular rhythm.      Pulses: Normal pulses.      Heart sounds: Normal heart sounds.   Pulmonary:      Effort: Pulmonary effort is normal.      Breath sounds: Normal breath sounds.   Abdominal:      General: Abdomen is flat. Bowel sounds are normal.      Palpations: Abdomen is soft.   Musculoskeletal:         General: Normal range of motion.      Cervical back: Normal range of motion and neck supple.   Skin:     General: Skin is warm.   Neurological:      General: No focal deficit present.      Mental Status: She is alert and oriented to person, place, and time. Mental status is at baseline.   Psychiatric:         Attention and Perception: Attention and perception normal. She is inattentive.         Mood and Affect: Mood and affect normal.         Speech: Speech normal.         Behavior: Behavior normal. Behavior is cooperative.         Thought Content: Thought content normal.         Cognition and Memory: Cognition and memory normal.         Judgment: Judgment normal.        Result Review :           Assessment and Plan   Diagnoses and all orders for this visit:    1. Insomnia related to another mental disorder (Primary)  -     mirtazapine (REMERON) 7.5 MG tablet; Take 1 tablet by mouth Every Night.  Dispense: 30 tablet; Refill: 0    2. Sjogren's syndrome, with unspecified organ involvement    3. Mild late onset Alzheimer's dementia without behavioral disturbance, psychotic disturbance, mood disturbance, or anxiety    4. Panlobular emphysema    5. Stage 3a chronic kidney disease  -     Basic Metabolic Panel  -     CBC & Differential  -     Hepatic  Function Panel (6) ([facility])  -     Hemoglobin A1c  -     Lipid Panel    6. History of tobacco use  -     CT Chest Low Dose Wo; Future    7. Screening for osteoporosis  -     DEXA Bone Density Axial           Follow Up   No follow-ups on file.  Patient was given instructions and counseling regarding her condition or for health maintenance advice. Please see specific information pulled into the AVS if appropriate.

## 2024-10-15 LAB
ALBUMIN SERPL-MCNC: 4 G/DL (ref 3.8–4.8)
ALP SERPL-CCNC: 163 IU/L (ref 44–121)
ALT SERPL-CCNC: 10 IU/L (ref 0–32)
AST SERPL-CCNC: 14 IU/L (ref 0–40)
BASOPHILS # BLD AUTO: 0.1 X10E3/UL (ref 0–0.2)
BASOPHILS NFR BLD AUTO: 1 %
BILIRUB DIRECT SERPL-MCNC: <0.1 MG/DL (ref 0–0.4)
BILIRUB SERPL-MCNC: <0.2 MG/DL (ref 0–1.2)
BUN SERPL-MCNC: 17 MG/DL (ref 8–27)
BUN/CREAT SERPL: 17 (ref 12–28)
CALCIUM SERPL-MCNC: 9.5 MG/DL (ref 8.7–10.3)
CHLORIDE SERPL-SCNC: 101 MMOL/L (ref 96–106)
CHOLEST SERPL-MCNC: 219 MG/DL (ref 100–199)
CO2 SERPL-SCNC: 22 MMOL/L (ref 20–29)
CREAT SERPL-MCNC: 1.02 MG/DL (ref 0.57–1)
EGFRCR SERPLBLD CKD-EPI 2021: 57 ML/MIN/1.73
EOSINOPHIL # BLD AUTO: 0.4 X10E3/UL (ref 0–0.4)
EOSINOPHIL NFR BLD AUTO: 4 %
ERYTHROCYTE [DISTWIDTH] IN BLOOD BY AUTOMATED COUNT: 13.6 % (ref 11.7–15.4)
GLUCOSE SERPL-MCNC: 101 MG/DL (ref 70–99)
HBA1C MFR BLD: 6.1 % (ref 4.8–5.6)
HCT VFR BLD AUTO: 38.9 % (ref 34–46.6)
HDLC SERPL-MCNC: 43 MG/DL
HGB BLD-MCNC: 12.4 G/DL (ref 11.1–15.9)
IMM GRANULOCYTES # BLD AUTO: 0.1 X10E3/UL (ref 0–0.1)
IMM GRANULOCYTES NFR BLD AUTO: 1 %
LDLC SERPL CALC-MCNC: 130 MG/DL (ref 0–99)
LYMPHOCYTES # BLD AUTO: 2.7 X10E3/UL (ref 0.7–3.1)
LYMPHOCYTES NFR BLD AUTO: 26 %
MCH RBC QN AUTO: 29.8 PG (ref 26.6–33)
MCHC RBC AUTO-ENTMCNC: 31.9 G/DL (ref 31.5–35.7)
MCV RBC AUTO: 94 FL (ref 79–97)
MONOCYTES # BLD AUTO: 0.7 X10E3/UL (ref 0.1–0.9)
MONOCYTES NFR BLD AUTO: 7 %
NEUTROPHILS # BLD AUTO: 6.4 X10E3/UL (ref 1.4–7)
NEUTROPHILS NFR BLD AUTO: 61 %
PLATELET # BLD AUTO: 340 X10E3/UL (ref 150–450)
POTASSIUM SERPL-SCNC: 5 MMOL/L (ref 3.5–5.2)
RBC # BLD AUTO: 4.16 X10E6/UL (ref 3.77–5.28)
SODIUM SERPL-SCNC: 140 MMOL/L (ref 134–144)
TRIGL SERPL-MCNC: 256 MG/DL (ref 0–149)
VLDLC SERPL CALC-MCNC: 46 MG/DL (ref 5–40)
WBC # BLD AUTO: 10.4 X10E3/UL (ref 3.4–10.8)

## 2024-10-24 ENCOUNTER — OFFICE VISIT (OUTPATIENT)
Age: 74
End: 2024-10-24
Payer: MEDICARE

## 2024-10-24 VITALS
HEIGHT: 64 IN | WEIGHT: 191 LBS | BODY MASS INDEX: 32.61 KG/M2 | OXYGEN SATURATION: 96 % | DIASTOLIC BLOOD PRESSURE: 74 MMHG | SYSTOLIC BLOOD PRESSURE: 134 MMHG | RESPIRATION RATE: 14 BRPM | TEMPERATURE: 97.5 F | HEART RATE: 96 BPM

## 2024-10-24 DIAGNOSIS — F33.1 MODERATE EPISODE OF RECURRENT MAJOR DEPRESSIVE DISORDER: Primary | ICD-10-CM

## 2024-10-24 DIAGNOSIS — F03.A3 MILD DEMENTIA WITH MOOD DISTURBANCE, UNSPECIFIED DEMENTIA TYPE: ICD-10-CM

## 2024-10-24 PROCEDURE — 1125F AMNT PAIN NOTED PAIN PRSNT: CPT

## 2024-10-24 PROCEDURE — 99214 OFFICE O/P EST MOD 30 MIN: CPT

## 2024-10-24 RX ORDER — FAMOTIDINE 40 MG/1
40 TABLET, FILM COATED ORAL DAILY
Qty: 90 TABLET | Refills: 3 | Status: SHIPPED | OUTPATIENT
Start: 2024-10-24 | End: 2024-10-25

## 2024-10-25 RX ORDER — ESCITALOPRAM OXALATE 10 MG/1
10 TABLET ORAL DAILY
Qty: 30 TABLET | Refills: 0 | Status: SHIPPED | OUTPATIENT
Start: 2024-10-25

## 2024-10-25 NOTE — PROGRESS NOTES
Chief Complaint  Depression    Subjective        Ellen Adamson presents to [facility]  Depression  Her past medical history is significant for depression.       History of Present Illness  The patient presents for evaluation of depression. She is accompanied by her .    She reports that her current medication regimen is not providing relief for her depression. She recalls being prescribed a daily medication for depression three years ago, but she has since run out and cannot remember the name of the medication. She is unable to see her previous doctor until 11/26/2024. She is currently taking  mirtazapine, donepezil, famotidine, folic acid, and rivastigmine. She has discontinued the use of Xanax and atorvastatin.    She has been diagnosed with dementia but is unsure of the specific type. She experienced significant life stressors around the time of her aunt's passing. She reports feeling better on her current medications but continues to experience symptoms of depression, including frequent crying, sadness, and difficulty sleeping. She is unsure if her depression affects her appetite. She does endorse feelings of guilt or worthlessness and occasionally feels hopeless and helpless. She has lost interest in activities she used to enjoy, such as attending her nephew's baseball games. She expresses a desire to find part-time work to occupy her time. She believes her depression predates her aunt's illness and has been ongoing for several years.    She recalls a period of intense crying that led her to seek help at [facility], where she received therapy for three months. She found the therapy helpful and was referred to Dr. Marrero , who prescribed her a medication that she took for about a year. She stopped taking the medication when she felt her condition had improved.    She does not endorse having suicidal thoughts but admits to feeling that she would be better off  "dead. She does not have any thoughts of self-harm. The accompanying adult male reports that her condition has not worsened and that she often expresses feelings of uselessness.       Objective   Vital Signs:  /74 (BP Location: Right arm, Patient Position: Sitting, Cuff Size: Adult)   Pulse 96   Temp 97.5 °F (36.4 °C) (Temporal)   Resp 14   Ht 162.6 cm (64.02\")   Wt 86.6 kg (191 lb)   SpO2 96%   BMI 32.77 kg/m²   Estimated body mass index is 32.77 kg/m² as calculated from the following:    Height as of this encounter: 162.6 cm (64.02\").    Weight as of this encounter: 86.6 kg (191 lb).            Review of Systems   Physical Exam  Constitutional:       Appearance: She is obese.   Cardiovascular:      Rate and Rhythm: Normal rate and regular rhythm.   Pulmonary:      Effort: Pulmonary effort is normal.      Breath sounds: Normal breath sounds.   Neurological:      Mental Status: She is alert.   Psychiatric:         Attention and Perception: Attention and perception normal.         Mood and Affect: Mood is depressed.         Speech: Speech normal.         Behavior: Behavior normal. Behavior is cooperative.         Thought Content: Thought content normal.         Cognition and Memory: Cognition normal. Memory is impaired.         Judgment: Judgment normal. Judgment is not impulsive or inappropriate.        Result Review :          Results                Assessment and Plan   Diagnoses and all orders for this visit:    1. Moderate episode of recurrent major depressive disorder (Primary)    2. Mild dementia with mood disturbance, unspecified dementia type    Other orders  -     famotidine (PEPCID) 40 MG tablet; Take 1 tablet by mouth Daily.  Dispense: 90 tablet; Refill: 3  -     escitalopram (Lexapro) 10 MG tablet; Take 1 tablet by mouth Daily.  Dispense: 30 tablet; Refill: 0        Assessment & Plan  1. Depression.  She reports crying two to three times a day, feeling sad without specific triggers, and " "experiencing hopelessness and helplessness. She also has difficulty sleeping but does not feel guilty or worthless. She has been depressed for years, with a history of depression while caring for her aunt. She was previously treated by Dr. Kurt Cespedes with an unspecified medication for about a year, which she found helpful. She was advised to contact her pharmacy to identify this medication. If it was effective and well-tolerated, it may be beneficial to reintroduce it into her treatment plan. A referral will be made for her to consult with a specialist in transcranial magnetic stimulation (TMS), a treatment option for individuals with depression, particularly those with treatment-resistant depression. If she is not a suitable candidate for TMS, arrangements will be made for her to receive regular therapy. She was also provided with the contact information for Dr. Moore.  Patient's  called back she was taking Lexapro 10 mg.  We are going to prescribe Lexapro and she is judith follow-up 4 weeks    2. Dementia.  She has been diagnosed with dementia, but the specific type is unknown. She is currently taking mirtazapine, donepezil, famotidine, folic acid, and rametamine. She reports feeling \"with it\" and has not experienced any worsening of her condition recently. The interplay between her dementia and depression was discussed, emphasizing the importance of treating both conditions to improve her overall quality of life.    3. Medication Management.  She is no longer taking alprazolam (Xanax) or atorvastatin. She confirmed that she does not need refills for any medications other than famotidine.                Follow Up   Return in about 1 month (around 11/24/2024).  Patient was given instructions and counseling regarding her condition or for health maintenance advice. Please see specific information pulled into the AVS if appropriate.         Patient or patient representative verbalized consent for the use of " Ambient Listening during the visit with  DONA Abraham for chart documentation. 10/25/2024  16:34 EDT

## 2024-10-30 ENCOUNTER — TELEPHONE (OUTPATIENT)
Age: 74
End: 2024-10-30
Payer: MEDICARE

## 2024-10-30 NOTE — TELEPHONE ENCOUNTER
Lane from [email or web address] calling in regards to needing a prior auth. He states he faxed it a couple days ago.

## 2024-10-30 NOTE — TELEPHONE ENCOUNTER
Phone called placed to patient to verify she wants a back brace before signing order. No answer unable to leave message due to mail box full, will try again.

## 2024-10-31 NOTE — TELEPHONE ENCOUNTER
Lane called in again from [employer] supplies needing to see if we received the forms and if they  needed to schedule the pt. I stated it looks like were waiting on the pt to confirm with us first.

## 2025-03-17 ENCOUNTER — OFFICE VISIT (OUTPATIENT)
Age: 75
End: 2025-03-17
Payer: MEDICARE

## 2025-03-17 VITALS
TEMPERATURE: 97.4 F | OXYGEN SATURATION: 97 % | RESPIRATION RATE: 14 BRPM | HEART RATE: 93 BPM | SYSTOLIC BLOOD PRESSURE: 134 MMHG | HEIGHT: 64 IN | DIASTOLIC BLOOD PRESSURE: 70 MMHG | BODY MASS INDEX: 32.77 KG/M2

## 2025-03-17 DIAGNOSIS — Z13.820 ENCOUNTER FOR SCREENING FOR OSTEOPOROSIS: ICD-10-CM

## 2025-03-17 DIAGNOSIS — Z00.00 MEDICARE ANNUAL WELLNESS VISIT, SUBSEQUENT: ICD-10-CM

## 2025-03-17 DIAGNOSIS — R73.03 PREDIABETES: ICD-10-CM

## 2025-03-17 DIAGNOSIS — Z12.2 ENCOUNTER FOR SCREENING FOR LUNG CANCER: ICD-10-CM

## 2025-03-17 DIAGNOSIS — Z00.01 ENCOUNTER FOR ROUTINE ADULT HEALTH EXAMINATION WITH ABNORMAL FINDINGS: Primary | ICD-10-CM

## 2025-03-17 DIAGNOSIS — Z78.0 POSTMENOPAUSAL: ICD-10-CM

## 2025-03-17 DIAGNOSIS — M35.00 SJOGREN'S SYNDROME, WITH UNSPECIFIED ORGAN INVOLVEMENT: ICD-10-CM

## 2025-03-17 DIAGNOSIS — F17.219 CIGARETTE NICOTINE DEPENDENCE WITH NICOTINE-INDUCED DISORDER: ICD-10-CM

## 2025-03-17 DIAGNOSIS — E78.2 MIXED HYPERLIPIDEMIA: ICD-10-CM

## 2025-03-17 DIAGNOSIS — N18.32 CKD STAGE 3B, GFR 30-44 ML/MIN: ICD-10-CM

## 2025-03-17 DIAGNOSIS — F17.200 SMOKER: ICD-10-CM

## 2025-03-17 PROBLEM — Z98.890 HISTORY OF REPAIR OF HIP JOINT: Status: ACTIVE | Noted: 2023-01-25

## 2025-03-17 PROBLEM — E53.8 FOLIC ACID DEFICIENCY: Status: ACTIVE | Noted: 2024-06-09

## 2025-03-17 PROBLEM — R29.898 BILATERAL LEG WEAKNESS: Status: ACTIVE | Noted: 2025-03-17

## 2025-03-17 PROBLEM — M35.03 SJOGREN'S SYNDROME WITH MYOPATHY: Status: ACTIVE | Noted: 2023-09-20

## 2025-03-17 PROBLEM — M54.6 THORACIC BACK PAIN: Status: ACTIVE | Noted: 2025-03-17

## 2025-03-17 PROBLEM — Z79.891 LONG-TERM CURRENT USE OF OPIATE ANALGESIC: Status: ACTIVE | Noted: 2023-09-20

## 2025-03-17 PROBLEM — Z91.89 CARDIOVASCULAR EVENT RISK: Status: ACTIVE | Noted: 2023-02-23

## 2025-03-17 PROCEDURE — G0439 PPPS, SUBSEQ VISIT: HCPCS | Performed by: FAMILY MEDICINE

## 2025-03-17 PROCEDURE — 1159F MED LIST DOCD IN RCRD: CPT | Performed by: FAMILY MEDICINE

## 2025-03-17 PROCEDURE — 1170F FXNL STATUS ASSESSED: CPT | Performed by: FAMILY MEDICINE

## 2025-03-17 PROCEDURE — 1160F RVW MEDS BY RX/DR IN RCRD: CPT | Performed by: FAMILY MEDICINE

## 2025-03-17 PROCEDURE — 1125F AMNT PAIN NOTED PAIN PRSNT: CPT | Performed by: FAMILY MEDICINE

## 2025-03-17 RX ORDER — FAMOTIDINE 40 MG/1
1 TABLET, FILM COATED ORAL DAILY
COMMUNITY
Start: 2025-01-22 | End: 2025-03-17

## 2025-03-17 RX ORDER — FAMOTIDINE 40 MG/1
40 TABLET, FILM COATED ORAL DAILY
COMMUNITY

## 2025-03-17 NOTE — PROGRESS NOTES
Subjective   The ABCs of the Annual Wellness Visit  Medicare Wellness Visit      Ellen Adamson is a 74 y.o. patient who presents for a Medicare Wellness Visit.    The following portions of the patient's history were reviewed and   updated as appropriate: allergies, current medications, past family history, past medical history, past social history, past surgical history, and problem list.    Compared to one year ago, the patient's physical   health is the same.  Compared to one year ago, the patient's mental   health is the same.    Recent Hospitalizations:  This patient has had a [facility] admission record on file within the last 365 days.  Current Medical Providers:  Patient Care Team:  Grant Layne MD as PCP - General (Family Medicine)    Outpatient Medications Prior to Visit   Medication Sig Dispense Refill    donepezil (ARICEPT) 10 MG tablet Take 1 tablet by mouth Every Night.      escitalopram (Lexapro) 10 MG tablet Take 1 tablet by mouth Daily. 30 tablet 0    famotidine (PEPCID) 40 MG tablet Take 1 tablet by mouth Daily.      folic acid (FOLVITE) 1 MG tablet Take 1 tablet by mouth Daily.      memantine (NAMENDA) 10 MG tablet Take 1 tablet by mouth Daily.      mirtazapine (REMERON) 7.5 MG tablet Take 1 tablet by mouth Every Night. 30 tablet 0    famotidine (PEPCID) 40 MG tablet Take 1 tablet by mouth Daily.       No facility-administered medications prior to visit.     No opioid medication identified on active medication list. I have reviewed chart for other potential  high risk medication/s and harmful drug interactions in the elderly.      Aspirin is not on active medication list.  Aspirin use is not indicated based on review of current medical condition/s. Risk of harm outweighs potential benefits.  .    Patient Active Problem List   Diagnosis    Osteoarthritis of right hip    GERD without esophagitis    CKD (chronic kidney disease) stage 3, GFR 30-59 ml/min    Sjogren's syndrome    Carrier  "of methicillin resistant Staphylococcus aureus (MRSA)    Obesity (BMI 30.0-34.9)    Mixed hyperlipidemia    Prediabetes    Anxiety and depression    Actinic keratosis    Arthralgia of right ankle    Stricture of esophagus    Carotid artery stenosis    Chronic gastritis    Chronic pain disorder    Degeneration of lumbar intervertebral disc    Heartburn    Hypothyroid    Thoracic neuritis    Lumbosacral radiculitis    Mild persistent asthma without complication    Peptic ulcer    Seasonal allergic rhinitis    Tobacco dependence    Chest pain    Bilateral leg weakness    Cardiovascular event risk    Folic acid deficiency    History of repair of hip joint    Long-term current use of opiate analgesic    Sjogren's syndrome with myopathy    Thoracic back pain     Advance Care Planning Advance Directive is not on file.  ACP discussion was held with the patient during this visit. Patient does not have an advance directive, declines further assistance.            Objective   Vitals:    03/17/25 1345   BP: 134/70   BP Location: Left arm   Patient Position: Sitting   Cuff Size: Adult   Pulse: 93   Resp: 14   Temp: 97.4 °F (36.3 °C)   TempSrc: Temporal   SpO2: 97%   Weight: Comment: refused   Height: 162.6 cm (64.02\")   PainSc: 6    PainLoc: Knee     Physical Activity: Not on file    Physical Exam  Constitutional:       General: She is not in acute distress.     Appearance: Normal appearance. She is not ill-appearing, toxic-appearing or diaphoretic.   HENT:      Head: Normocephalic and atraumatic.      Right Ear: There is no impacted cerumen.      Left Ear: There is no impacted cerumen.      Nose: No congestion or rhinorrhea.      Mouth/Throat:      Pharynx: Oropharynx is clear. No oropharyngeal exudate or posterior oropharyngeal erythema.   Eyes:      General: No scleral icterus.        Right eye: No discharge.         Left eye: No discharge.      Extraocular Movements: Extraocular movements intact.      Conjunctiva/sclera: " "Conjunctivae normal.      Pupils: Pupils are equal, round, and reactive to light.   Cardiovascular:      Rate and Rhythm: Normal rate and regular rhythm.      Pulses: Normal pulses.      Heart sounds: Normal heart sounds.   Pulmonary:      Effort: Pulmonary effort is normal.      Breath sounds: Normal breath sounds.   Abdominal:      General: Abdomen is flat. Bowel sounds are normal.      Palpations: Abdomen is soft.   Musculoskeletal:         General: Normal range of motion.      Cervical back: Normal range of motion and neck supple.   Skin:     General: Skin is warm.   Neurological:      General: No focal deficit present.      Mental Status: She is alert and oriented to person, place, and time. Mental status is at baseline.   Psychiatric:         Mood and Affect: Mood normal.         Behavior: Behavior normal.         Thought Content: Thought content normal.         Judgment: Judgment normal.          Estimated body mass index is 32.77 kg/m² as calculated from the following:    Height as of this encounter: 162.6 cm (64.02\").    Weight as of 10/24/24: 86.6 kg (191 lb).                Does the patient have evidence of cognitive impairment? No                                                                                               Health  Risk Assessment    Smoking Status:  Social History     Tobacco Use   Smoking Status Every Day    Current packs/day: 1.00    Average packs/day: 1 pack/day for 61.2 years (61.2 ttl pk-yrs)    Types: Cigarettes    Start date: 1964    Passive exposure: Past   Smokeless Tobacco Never     Alcohol Consumption:  Social History     Substance and Sexual Activity   Alcohol Use Yes    Comment: Completely social       Fall Risk Screen  STEADI Fall Risk Assessment was completed, and patient is at LOW risk for falls.Assessment completed on:3/17/2025    Depression Screening   Little interest or pleasure in doing things? Several days   Feeling down, depressed, or hopeless? Several days   PHQ-2 " Total Score 2   Trouble falling or staying asleep, or sleeping too much? Nearly every day   Feeling tired or having little energy? Nearly every day   Poor appetite or overeating? Not at all   Feeling bad about yourself - or that you are a failure or have let yourself or your family down? Several days   Trouble concentrating on things, such as reading the newspaper or watching television? Not at all   Moving or speaking so slowly that other people could have noticed? Or the opposite - being so fidgety or restless that you have been moving around a lot more than usual? Not at all     Thoughts that you would be better off dead, or of hurting yourself in some way? Not at all   PHQ-9 Total Score 9   If you checked off any problems, how difficult have these problems made it for you to do your work, take care of things at home, or get along with other people? Not difficult at all        Health Habits and Functional and Cognitive Screening:      3/17/2025     1:48 PM   Functional & Cognitive Status   Do you have difficulty preparing food and eating? No   Do you have difficulty bathing yourself, getting dressed or grooming yourself? No   Do you have difficulty using the toilet? No   Do you have difficulty moving around from place to place? No   Do you have trouble with steps or getting out of a bed or a chair? No   Current Diet Well Balanced Diet   Dental Exam Up to date   Eye Exam Up to date   Exercise (times per week) 1 times per week   Current Exercises Include House Cleaning   Do you need help using the phone?  No   Are you deaf or do you have serious difficulty hearing?  No   Do you need help to go to places out of walking distance? No   Do you need help shopping? No   Do you need help preparing meals?  No   Do you need help with housework?  No   Do you need help with laundry? No   Do you need help taking your medications? No   Do you need help managing money? No   Do you ever drive or ride in a car without wearing a  seat belt? No   Have you felt unusual stress, anger or loneliness in the last month? No   Who do you live with? Spouse   If you need help, do you have trouble finding someone available to you? No   Have you been bothered in the last four weeks by sexual problems? No   Do you have difficulty concentrating, remembering or making decisions? No           Age-appropriate Screening Schedule:  Refer to the list below for future screening recommendations based on patient's age, sex and/or medical conditions. Orders for these recommended tests are listed in the plan section. The patient has been provided with a written plan.    Health Maintenance List  Health Maintenance   Topic Date Due    DXA SCAN  Never done    LUNG CANCER SCREENING  09/27/2024    ZOSTER VACCINE (1 of 2) 03/17/2025 (Originally 11/5/1998)    INFLUENZA VACCINE  03/31/2025 (Originally 7/1/2024)    TDAP/TD VACCINES (3 - Tdap) 02/25/2026 (Originally 6/7/2024)    LIPID PANEL  10/14/2025    BMI FOLLOWUP  10/14/2025    ANNUAL WELLNESS VISIT  03/17/2026    HEPATITIS C SCREENING  Completed    RSV Vaccine - Adults  Completed    Pneumococcal Vaccine 50+  Completed    COVID-19 Vaccine  Discontinued    MAMMOGRAM  Discontinued    HEMOGLOBIN A1C  Discontinued    URINE MICROALBUMIN-CREATININE RATIO (uACR)  Discontinued    COLORECTAL CANCER SCREENING  Discontinued                                                                                                                                                CMS Preventative Services Quick Reference  Risk Factors Identified During Encounter  Immunizations Discussed/Encouraged: Influenza, Pneumococcal 23, and Shingrix    The above risks/problems have been discussed with the patient.  Pertinent information has been shared with the patient in the After Visit Summary.  An After Visit Summary and PPPS were made available to the patient.    Follow Up:   Next Medicare Wellness visit to be scheduled in 1 year.     Assessment &  Plan  Encounter for routine adult health examination with abnormal findings         Medicare annual wellness visit, subsequent         CKD stage 3b, GFR 30-44 ml/min      Orders:    Basic Metabolic Panel    Sjogren's syndrome, with unspecified organ involvement    Orders:    CBC & Differential    Mixed hyperlipidemia       Orders:    Hepatic Function Panel (6) ([facility])    Lipid Panel    Prediabetes    Orders:    Basic Metabolic Panel    Hemoglobin A1c    Encounter for screening for lung cancer    Orders:    CT Chest Low Dose Wo; Future    Encounter for screening for osteoporosis         Postmenopausal    Orders:    DEXA Bone Density Axial; Future    Smoker    Orders:    CT Chest Low Dose Wo; Future    Cigarette nicotine dependence with nicotine-induced disorder                                            Orders:    CT Chest Low Dose Wo; Future         Follow Up:   Return in about 1 day (around 3/18/2025).        Answers submitted by the patient for this visit:  Problem not listed (Submitted on 3/17/2025)  Chief Complaint: Other medical problem  Reason for appointment: Ringing in ears, pain in knee, weight loss  joint pain: Yes  Onset: in the past 7 days  Chronicity: chronic  Frequency: constantly

## 2025-03-18 ENCOUNTER — PROCEDURE VISIT (OUTPATIENT)
Age: 75
End: 2025-03-18
Payer: MEDICARE

## 2025-03-18 VITALS
RESPIRATION RATE: 14 BRPM | OXYGEN SATURATION: 98 % | SYSTOLIC BLOOD PRESSURE: 134 MMHG | TEMPERATURE: 97 F | HEART RATE: 95 BPM | DIASTOLIC BLOOD PRESSURE: 70 MMHG | BODY MASS INDEX: 32.77 KG/M2 | HEIGHT: 64 IN

## 2025-03-18 DIAGNOSIS — M17.11 PRIMARY OSTEOARTHRITIS OF RIGHT KNEE: Primary | ICD-10-CM

## 2025-03-18 LAB
ALBUMIN SERPL-MCNC: 4.1 G/DL (ref 3.8–4.8)
ALP SERPL-CCNC: 159 IU/L (ref 44–121)
ALT SERPL-CCNC: 12 IU/L (ref 0–32)
AST SERPL-CCNC: 20 IU/L (ref 0–40)
BASOPHILS # BLD AUTO: 0.1 X10E3/UL (ref 0–0.2)
BASOPHILS NFR BLD AUTO: 1 %
BILIRUB DIRECT SERPL-MCNC: 0.07 MG/DL (ref 0–0.4)
BILIRUB SERPL-MCNC: <0.2 MG/DL (ref 0–1.2)
BUN SERPL-MCNC: 15 MG/DL (ref 8–27)
BUN/CREAT SERPL: 14 (ref 12–28)
CALCIUM SERPL-MCNC: 9.9 MG/DL (ref 8.7–10.3)
CHLORIDE SERPL-SCNC: 106 MMOL/L (ref 96–106)
CHOLEST SERPL-MCNC: 237 MG/DL (ref 100–199)
CO2 SERPL-SCNC: 24 MMOL/L (ref 20–29)
CREAT SERPL-MCNC: 1.06 MG/DL (ref 0.57–1)
EGFRCR SERPLBLD CKD-EPI 2021: 54 ML/MIN/1.73
EOSINOPHIL # BLD AUTO: 0.3 X10E3/UL (ref 0–0.4)
EOSINOPHIL NFR BLD AUTO: 3 %
ERYTHROCYTE [DISTWIDTH] IN BLOOD BY AUTOMATED COUNT: 13 % (ref 11.7–15.4)
GLUCOSE SERPL-MCNC: 96 MG/DL (ref 70–99)
HBA1C MFR BLD: 6.2 % (ref 4.8–5.6)
HCT VFR BLD AUTO: 44.3 % (ref 34–46.6)
HDLC SERPL-MCNC: 42 MG/DL
HGB BLD-MCNC: 14.3 G/DL (ref 11.1–15.9)
IMM GRANULOCYTES # BLD AUTO: 0.1 X10E3/UL (ref 0–0.1)
IMM GRANULOCYTES NFR BLD AUTO: 1 %
LDLC SERPL CALC-MCNC: 146 MG/DL (ref 0–99)
LYMPHOCYTES # BLD AUTO: 2.4 X10E3/UL (ref 0.7–3.1)
LYMPHOCYTES NFR BLD AUTO: 22 %
MCH RBC QN AUTO: 29.9 PG (ref 26.6–33)
MCHC RBC AUTO-ENTMCNC: 32.3 G/DL (ref 31.5–35.7)
MCV RBC AUTO: 93 FL (ref 79–97)
MONOCYTES # BLD AUTO: 0.8 X10E3/UL (ref 0.1–0.9)
MONOCYTES NFR BLD AUTO: 7 %
NEUTROPHILS # BLD AUTO: 7.6 X10E3/UL (ref 1.4–7)
NEUTROPHILS NFR BLD AUTO: 66 %
PLATELET # BLD AUTO: 334 X10E3/UL (ref 150–450)
POTASSIUM SERPL-SCNC: 5.1 MMOL/L (ref 3.5–5.2)
RBC # BLD AUTO: 4.78 X10E6/UL (ref 3.77–5.28)
SODIUM SERPL-SCNC: 142 MMOL/L (ref 134–144)
TRIGL SERPL-MCNC: 267 MG/DL (ref 0–149)
VLDLC SERPL CALC-MCNC: 49 MG/DL (ref 5–40)
WBC # BLD AUTO: 11.3 X10E3/UL (ref 3.4–10.8)

## 2025-03-18 RX ORDER — METHYLPREDNISOLONE ACETATE 80 MG/ML
80 INJECTION, SUSPENSION INTRA-ARTICULAR; INTRALESIONAL; INTRAMUSCULAR; SOFT TISSUE ONCE
Status: COMPLETED | OUTPATIENT
Start: 2025-03-18 | End: 2025-03-18

## 2025-03-18 RX ORDER — LIDOCAINE HYDROCHLORIDE 10 MG/ML
5 INJECTION, SOLUTION INFILTRATION; PERINEURAL ONCE
Status: COMPLETED | OUTPATIENT
Start: 2025-03-18 | End: 2025-03-18

## 2025-03-18 RX ADMIN — METHYLPREDNISOLONE ACETATE 80 MG: 80 INJECTION, SUSPENSION INTRA-ARTICULAR; INTRALESIONAL; INTRAMUSCULAR; SOFT TISSUE at 14:15

## 2025-03-18 RX ADMIN — LIDOCAINE HYDROCHLORIDE 5 ML: 10 INJECTION, SOLUTION INFILTRATION; PERINEURAL at 14:15

## 2025-03-18 NOTE — PROGRESS NOTES
"Chief Complaint  Knee Pain    Subjective        Ellen Adamson presents to [facility]  Knee Pain       Patient comes in with complaints of right knee pain has been present for the last 2 to 3 weeks been progressively increasing.  She has been seen [place] orthopedic physicians before in the past.  She would like me to do a knee injection.  She has a known history of osteoarthritis of the knee.  History of Present Illness         Objective   Vital Signs:  /70 (BP Location: Left arm, Patient Position: Sitting, Cuff Size: Adult)   Pulse 95   Temp 97 °F (36.1 °C) (Temporal)   Resp 14   Ht 162.6 cm (64.02\")   SpO2 98%   BMI 32.77 kg/m²   Estimated body mass index is 32.77 kg/m² as calculated from the following:    Height as of this encounter: 162.6 cm (64.02\").    Weight as of 10/24/24: 86.6 kg (191 lb).            Physical Exam  Constitutional:       General: She is not in acute distress.     Appearance: Normal appearance. She is not ill-appearing, toxic-appearing or diaphoretic.   HENT:      Head: Normocephalic and atraumatic.      Right Ear: There is no impacted cerumen.      Left Ear: There is no impacted cerumen.      Nose: No congestion or rhinorrhea.      Mouth/Throat:      Pharynx: Oropharynx is clear. No oropharyngeal exudate or posterior oropharyngeal erythema.   Eyes:      General: No scleral icterus.        Right eye: No discharge.         Left eye: No discharge.      Extraocular Movements: Extraocular movements intact.      Conjunctiva/sclera: Conjunctivae normal.      Pupils: Pupils are equal, round, and reactive to light.   Cardiovascular:      Rate and Rhythm: Normal rate and regular rhythm.      Pulses: Normal pulses.      Heart sounds: Normal heart sounds.   Pulmonary:      Effort: Pulmonary effort is normal.      Breath sounds: Normal breath sounds.   Abdominal:      General: Abdomen is flat. Bowel sounds are normal.      Palpations: Abdomen is soft. "   Musculoskeletal:      Cervical back: Normal range of motion and neck supple.      Right knee: Bony tenderness present. Decreased range of motion. Tenderness present.   Skin:     General: Skin is warm.   Neurological:      General: No focal deficit present.      Mental Status: She is alert and oriented to person, place, and time. Mental status is at baseline.   Psychiatric:         Mood and Affect: Mood normal.         Behavior: Behavior normal.         Thought Content: Thought content normal.         Judgment: Judgment normal.                    Result Review :    Results                Assessment and Plan   Diagnoses and all orders for this visit:    1. Primary osteoarthritis of right knee (Primary)      Knee    The procedure was explained to the patient including its potential risk and benefit.    The patient was sitting comfortably with the right knee exposed.    The knee was prepped and draped in a sterile fashion.      A 25-gauge 1-1/2  inch needle with 5cc of 1% lidocaine was used for anesthesia.    The patient was approachedanteriorly.    When anesthesia was adequately achieved 80mg depomedrol was injected.      The needle was withdrawn and a sterile dressing was applied.  The patient tolerated the procedure well.      The patient was instructed to avoid heavy lifting or vigorous activity for 48 hours.   Assessment & Plan            Follow Up   No follow-ups on file.  Patient was given instructions and counseling regarding her condition or for health maintenance advice. Please see specific information pulled into the AVS if appropriate.         Patient or patient representative verbalized consent for the use of Ambient Listening during the visit with  Grant Layne MD for chart documentation. 3/18/2025  13:27 EDT

## 2025-03-19 ENCOUNTER — PATIENT ROUNDING (BHMG ONLY) (OUTPATIENT)
Age: 75
End: 2025-03-19
Payer: MEDICARE

## 2025-06-09 ENCOUNTER — READMISSION MANAGEMENT (OUTPATIENT)
Dept: CALL CENTER | Facility: HOSPITAL | Age: 75
End: 2025-06-09
Payer: MEDICARE

## 2025-06-09 NOTE — OUTREACH NOTE
Prep Survey      Flowsheet Row Responses   [facility] patient discharged from? Non-[facility]   Is LACE score < 7 ? Non- Discharge   Eligibility [facility]   Date of Admission 06/08/25   Date of Discharge 06/09/25   Discharge diagnosis NSTEMI (non-ST elevated myocardial infarction)   Does the patient have one of the following disease processes/diagnoses(primary or secondary)? Other   Prep survey completed? Yes            Bree NESBITT - Registered Nurse

## 2025-06-10 ENCOUNTER — TRANSITIONAL CARE MANAGEMENT TELEPHONE ENCOUNTER (OUTPATIENT)
Dept: CALL CENTER | Facility: HOSPITAL | Age: 75
End: 2025-06-10
Payer: MEDICARE

## 2025-06-10 NOTE — OUTREACH NOTE
Call Center TCM Note      Flowsheet Row Responses   [facility] patient discharged from? Non-   Does the patient have one of the following disease processes/diagnoses(primary or secondary)? Other   TCM attempt successful? Yes   Call start time 0848   Call end time 0849   Discharge diagnosis NSTEMI (non-ST elevated myocardial infarction)   Meds reviewed with patient/caregiver? Yes   Is the patient having any side effects they believe may be caused by any medication additions or changes? No   Does the patient have all medications ordered at discharge? Yes   Is the patient taking all medications as directed (includes completed medication regime)? Yes   Comments Nothing available for TCM appt.   Does the patient have an appointment with their PCP within 7-14 days of discharge? No   Nursing Interventions Routed TCM call to PCP office   Has home health visited the patient within 72 hours of discharge? N/A   Psychosocial issues? No   Did the patient receive a copy of their discharge instructions? Yes   Nursing interventions Reviewed instructions with patient   What is the patient's perception of their health status since discharge? Improving   Is the patient/caregiver able to teach back the hierarchy of who to call/visit for symptoms/problems? PCP, Specialist, Home health nurse, Urgent Care, ED, 911 Yes   TCM call completed? Yes   Wrap up additional comments Pt doing well and routed message to PCP to set up TCM f/u call.   Call end time 0849            Keyla CROUCH - Registered Nurse    6/10/2025, 08:50 EDT

## 2025-07-08 ENCOUNTER — TELEPHONE (OUTPATIENT)
Age: 75
End: 2025-07-08

## 2025-07-08 ENCOUNTER — OFFICE VISIT (OUTPATIENT)
Age: 75
End: 2025-07-08
Payer: MEDICARE

## 2025-07-08 VITALS
DIASTOLIC BLOOD PRESSURE: 84 MMHG | RESPIRATION RATE: 14 BRPM | SYSTOLIC BLOOD PRESSURE: 154 MMHG | TEMPERATURE: 97.6 F | HEART RATE: 90 BPM | BODY MASS INDEX: 32.89 KG/M2 | HEIGHT: 64 IN | OXYGEN SATURATION: 95 %

## 2025-07-08 DIAGNOSIS — M17.11 PRIMARY OSTEOARTHRITIS OF RIGHT KNEE: Primary | ICD-10-CM

## 2025-07-08 PROCEDURE — 1125F AMNT PAIN NOTED PAIN PRSNT: CPT | Performed by: FAMILY MEDICINE

## 2025-07-08 PROCEDURE — 20610 DRAIN/INJ JOINT/BURSA W/O US: CPT | Performed by: FAMILY MEDICINE

## 2025-07-08 RX ORDER — ATORVASTATIN CALCIUM 40 MG/1
40 TABLET, FILM COATED ORAL DAILY
COMMUNITY
Start: 2025-06-09

## 2025-07-08 RX ORDER — ASPIRIN 81 MG/1
81 TABLET ORAL DAILY
COMMUNITY

## 2025-07-08 RX ORDER — ISOSORBIDE MONONITRATE 30 MG/1
30 TABLET, EXTENDED RELEASE ORAL DAILY
COMMUNITY
Start: 2025-06-10

## 2025-07-08 RX ORDER — METHYLPREDNISOLONE ACETATE 80 MG/ML
80 INJECTION, SUSPENSION INTRA-ARTICULAR; INTRALESIONAL; INTRAMUSCULAR; SOFT TISSUE ONCE
Status: COMPLETED | OUTPATIENT
Start: 2025-07-08 | End: 2025-07-08

## 2025-07-08 RX ORDER — LIDOCAINE HYDROCHLORIDE 10 MG/ML
5 INJECTION, SOLUTION INFILTRATION; PERINEURAL ONCE
Status: COMPLETED | OUTPATIENT
Start: 2025-07-08 | End: 2025-07-08

## 2025-07-08 RX ORDER — ALPRAZOLAM 1 MG/1
1 TABLET ORAL 3 TIMES DAILY PRN
COMMUNITY

## 2025-07-08 RX ADMIN — LIDOCAINE HYDROCHLORIDE 5 ML: 10 INJECTION, SOLUTION INFILTRATION; PERINEURAL at 15:26

## 2025-07-08 RX ADMIN — METHYLPREDNISOLONE ACETATE 80 MG: 80 INJECTION, SUSPENSION INTRA-ARTICULAR; INTRALESIONAL; INTRAMUSCULAR; SOFT TISSUE at 15:27

## 2025-07-08 NOTE — PROGRESS NOTES
"Chief Complaint  Knee Pain (Bilateral- right worse)    Subjective        Ellen Adamson presents to [facility]  Knee Pain         History of Present Illness         Objective   Vital Signs:  /84 (BP Location: Left arm, Patient Position: Sitting, Cuff Size: Adult)   Pulse 90   Temp 97.6 °F (36.4 °C) (Temporal)   Resp 14   Ht 162.3 cm (63.9\")   SpO2 95%   BMI 32.89 kg/m²   Estimated body mass index is 32.89 kg/m² as calculated from the following:    Height as of this encounter: 162.3 cm (63.9\").    Weight as of 10/24/24: 86.6 kg (191 lb).            Physical Exam  Constitutional:       General: She is not in acute distress.     Appearance: Normal appearance. She is not ill-appearing, toxic-appearing or diaphoretic.   HENT:      Head: Normocephalic and atraumatic.      Right Ear: There is no impacted cerumen.      Left Ear: There is no impacted cerumen.      Nose: No congestion or rhinorrhea.      Mouth/Throat:      Pharynx: Oropharynx is clear. No oropharyngeal exudate or posterior oropharyngeal erythema.   Eyes:      General: No scleral icterus.        Right eye: No discharge.         Left eye: No discharge.      Extraocular Movements: Extraocular movements intact.      Conjunctiva/sclera: Conjunctivae normal.      Pupils: Pupils are equal, round, and reactive to light.   Cardiovascular:      Rate and Rhythm: Normal rate and regular rhythm.      Pulses: Normal pulses.      Heart sounds: Normal heart sounds.   Pulmonary:      Effort: Pulmonary effort is normal.      Breath sounds: Normal breath sounds.   Abdominal:      General: Abdomen is flat. Bowel sounds are normal.      Palpations: Abdomen is soft.   Musculoskeletal:         General: Normal range of motion.      Cervical back: Normal range of motion and neck supple.   Skin:     General: Skin is warm.   Neurological:      General: No focal deficit present.      Mental Status: She is alert and oriented to person, place, " and time. Mental status is at baseline.   Psychiatric:         Mood and Affect: Mood normal.         Behavior: Behavior normal.         Thought Content: Thought content normal.         Judgment: Judgment normal.                    Result Review :    Results                Assessment and Plan   Diagnoses and all orders for this visit:    1. Primary osteoarthritis of right knee (Primary)        Assessment & Plan     Knee    The procedure was explained to the patient including its potential risk and benefit.    The patient was sitting comfortably with the right knee exposed.    The knee was prepped and draped in a sterile fashion.      A 25-gauge 1-1/2  inch needle with 5cc of 1% lidocaine was used for anesthesia.    The patient was approachedanteriorly.    When anesthesia was adequately achieved 80mg depomedrol was injected.      The needle was withdrawn and a sterile dressing was applied.  The patient tolerated the procedure well.      The patient was instructed to avoid heavy lifting or vigorous activity for 48 hours.        Follow Up   No follow-ups on file.  Patient was given instructions and counseling regarding her condition or for health maintenance advice. Please see specific information pulled into the AVS if appropriate.         Patient or patient representative verbalized consent for the use of Ambient Listening during the visit with  Grant Layne MD for chart documentation. 7/8/2025  14:01 EDT

## (undated) DEVICE — RECIPROCATING BLADE HEAVY DUTY LONG, OFFSET  (77.6 X 0.77 X 11.2MM)

## (undated) DEVICE — GLV SURG BIOGEL LTX PF 8

## (undated) DEVICE — TRY SKINPREP DRYPREP

## (undated) DEVICE — ANTIBACTERIAL UNDYED BRAIDED (POLYGLACTIN 910), SYNTHETIC ABSORBABLE SUTURE: Brand: COATED VICRYL

## (undated) DEVICE — MAT FLR ABSORBENT LG 4FT 10 2.5FT

## (undated) DEVICE — NEEDLE, QUINCKE, 18GX3.5": Brand: MEDLINE

## (undated) DEVICE — SOL ISO/ALC RUB 70PCT 4OZ

## (undated) DEVICE — APPL CHLORAPREP W/TINT 26ML ORNG

## (undated) DEVICE — GLV SURG SENSICARE W/ALOE PF LF 8 STRL

## (undated) DEVICE — 3M™ IOBAN™ 2 ANTIMICROBIAL INCISE DRAPE 6640EZ: Brand: IOBAN™ 2

## (undated) DEVICE — PREP SOL POVIDONE/IODINE BT 4OZ

## (undated) DEVICE — DRAPE,U/ SHT,SPLIT,PLAS,STERIL: Brand: MEDLINE

## (undated) DEVICE — PK ANT HIP 40

## (undated) DEVICE — OPTIFOAM GENTLE SA, POSTOP, 4X8: Brand: MEDLINE

## (undated) DEVICE — SUT VIC 0 CT1 36IN J946H

## (undated) DEVICE — ADHS SKIN DERMABOND TOP ADVANCED

## (undated) DEVICE — GLV SURG BIOGEL LTX PF 8 1/2